# Patient Record
Sex: FEMALE | Race: BLACK OR AFRICAN AMERICAN | NOT HISPANIC OR LATINO
[De-identification: names, ages, dates, MRNs, and addresses within clinical notes are randomized per-mention and may not be internally consistent; named-entity substitution may affect disease eponyms.]

---

## 2019-11-15 PROBLEM — Z00.00 ENCOUNTER FOR PREVENTIVE HEALTH EXAMINATION: Status: ACTIVE | Noted: 2019-11-15

## 2020-01-21 ENCOUNTER — APPOINTMENT (OUTPATIENT)
Dept: OBGYN | Facility: CLINIC | Age: 30
End: 2020-01-21
Payer: COMMERCIAL

## 2020-01-21 VITALS
BODY MASS INDEX: 39.09 KG/M2 | SYSTOLIC BLOOD PRESSURE: 100 MMHG | DIASTOLIC BLOOD PRESSURE: 80 MMHG | HEIGHT: 64 IN | WEIGHT: 229 LBS

## 2020-01-21 DIAGNOSIS — Z30.431 ENCOUNTER FOR ROUTINE CHECKING OF INTRAUTERINE CONTRACEPTIVE DEVICE: ICD-10-CM

## 2020-01-21 DIAGNOSIS — N39.0 URINARY TRACT INFECTION, SITE NOT SPECIFIED: ICD-10-CM

## 2020-01-21 DIAGNOSIS — Z82.0 FAMILY HISTORY OF EPILEPSY AND OTHER DISEASES OF THE NERVOUS SYSTEM: ICD-10-CM

## 2020-01-21 DIAGNOSIS — Z80.42 FAMILY HISTORY OF MALIGNANT NEOPLASM OF PROSTATE: ICD-10-CM

## 2020-01-21 DIAGNOSIS — R87.622 LOW GRADE SQUAMOUS INTRAEPITHELIAL LESION ON CYTOLOGIC SMEAR OF VAGINA (LGSIL): ICD-10-CM

## 2020-01-21 PROCEDURE — 99204 OFFICE O/P NEW MOD 45 MIN: CPT

## 2020-01-21 RX ORDER — MULTIVITAMIN
TABLET ORAL
Refills: 0 | Status: ACTIVE | COMMUNITY

## 2020-01-21 NOTE — HISTORY OF PRESENT ILLNESS
[Normal Amount/Duration] : was of a normal amount and duration [Spotting Between  Menses] : spotting reported between menses [Sexually Active] : is not sexually active

## 2020-01-21 NOTE — PHYSICAL EXAM
[Awake] : awake [Alert] : alert [Acute Distress] : no acute distress [LAD] : no lymphadenopathy [Mass] : no breast mass [Nipple Discharge] : no nipple discharge [Axillary LAD] : no axillary lymphadenopathy [Soft] : soft [Tender] : non tender [Oriented x3] : oriented to person, place, and time [Normal] : uterus [No Bleeding] : there was no active vaginal bleeding [Uterine Adnexae] : were not tender and not enlarged [Enlarged ___ wks] : enlarged [unfilled] ~Uweeks [Adnexa Tenderness] : were not tender [Ovarian Mass (___ Cm)] : there were no adnexal masses

## 2020-01-21 NOTE — COUNSELING
[Nutrition] : nutrition [Exercise] : exercise [STD (testing, results, tx)] : STD (testing, results, tx) [Fertility Options] : fertility options [Contraception] : contraception [Pre/Post Op Instructions] : pre/post op instructions

## 2020-02-04 LAB
C TRACH RRNA SPEC QL NAA+PROBE: NOT DETECTED
CANDIDA VAG CYTO: NOT DETECTED
G VAGINALIS+PREV SP MTYP VAG QL MICRO: NOT DETECTED
N GONORRHOEA RRNA SPEC QL NAA+PROBE: NOT DETECTED
SOURCE AMPLIFICATION: NORMAL
T VAGINALIS VAG QL WET PREP: NOT DETECTED

## 2020-02-05 ENCOUNTER — FORM ENCOUNTER (OUTPATIENT)
Age: 30
End: 2020-02-05

## 2020-02-06 ENCOUNTER — APPOINTMENT (OUTPATIENT)
Dept: MRI IMAGING | Facility: CLINIC | Age: 30
End: 2020-02-06
Payer: COMMERCIAL

## 2020-02-06 ENCOUNTER — OUTPATIENT (OUTPATIENT)
Dept: OUTPATIENT SERVICES | Facility: HOSPITAL | Age: 30
LOS: 1 days | End: 2020-02-06
Payer: COMMERCIAL

## 2020-02-06 DIAGNOSIS — D21.9 BENIGN NEOPLASM OF CONNECTIVE AND OTHER SOFT TISSUE, UNSPECIFIED: ICD-10-CM

## 2020-02-06 PROCEDURE — 72197 MRI PELVIS W/O & W/DYE: CPT | Mod: 26

## 2020-02-06 PROCEDURE — 72197 MRI PELVIS W/O & W/DYE: CPT

## 2020-02-06 PROCEDURE — A9585: CPT

## 2020-03-02 ENCOUNTER — TRANSCRIPTION ENCOUNTER (OUTPATIENT)
Age: 30
End: 2020-03-02

## 2020-03-03 ENCOUNTER — APPOINTMENT (OUTPATIENT)
Dept: OBGYN | Facility: CLINIC | Age: 30
End: 2020-03-03
Payer: COMMERCIAL

## 2020-03-03 PROCEDURE — 99243 OFF/OP CNSLTJ NEW/EST LOW 30: CPT

## 2020-06-29 ENCOUNTER — FORM ENCOUNTER (OUTPATIENT)
Age: 30
End: 2020-06-29

## 2020-06-30 ENCOUNTER — APPOINTMENT (OUTPATIENT)
Dept: OBGYN | Facility: CLINIC | Age: 30
End: 2020-06-30
Payer: COMMERCIAL

## 2020-06-30 PROCEDURE — 36415 COLL VENOUS BLD VENIPUNCTURE: CPT

## 2020-07-24 ENCOUNTER — OUTPATIENT (OUTPATIENT)
Dept: OUTPATIENT SERVICES | Facility: HOSPITAL | Age: 30
LOS: 1 days | Discharge: HOME | End: 2020-07-24

## 2020-07-24 ENCOUNTER — APPOINTMENT (OUTPATIENT)
Dept: HEMATOLOGY ONCOLOGY | Facility: CLINIC | Age: 30
End: 2020-07-24
Payer: COMMERCIAL

## 2020-07-24 DIAGNOSIS — Z78.9 OTHER SPECIFIED HEALTH STATUS: ICD-10-CM

## 2020-07-24 DIAGNOSIS — D21.9 BENIGN NEOPLASM OF CONNECTIVE AND OTHER SOFT TISSUE, UNSPECIFIED: ICD-10-CM

## 2020-07-24 DIAGNOSIS — R71.8 OTHER ABNORMALITY OF RED BLOOD CELLS: ICD-10-CM

## 2020-07-24 DIAGNOSIS — Z83.511 FAMILY HISTORY OF GLAUCOMA: ICD-10-CM

## 2020-07-24 DIAGNOSIS — Z80.7 FAMILY HISTORY OF OTHER MALIGNANT NEOPLASMS OF LYMPHOID, HEMATOPOIETIC AND RELATED TISSUES: ICD-10-CM

## 2020-07-24 PROCEDURE — 99204 OFFICE O/P NEW MOD 45 MIN: CPT | Mod: 95

## 2020-07-24 NOTE — CONSULT LETTER
[Dear  ___] : Dear  [unfilled], [Consult Letter:] : I had the pleasure of evaluating your patient, [unfilled]. [Consult Closing:] : Thank you very much for allowing me to participate in the care of this patient.  If you have any questions, please do not hesitate to contact me. [Please see my note below.] : Please see my note below. [Sincerely,] : Sincerely, [FreeTextEntry2] : Dr Vicky Renee

## 2020-07-24 NOTE — PHYSICAL EXAM
[Normal] : grossly intact [Fully active, able to carry on all pre-disease performance without restriction] : Status 0 - Fully active, able to carry on all pre-disease performance without restriction [Obese] : obese

## 2020-07-24 NOTE — REVIEW OF SYSTEMS
[Negative] : Allergic/Immunologic [FreeTextEntry8] : low abdominal pain likely secondary to large fibroid.

## 2020-07-24 NOTE — HISTORY OF PRESENT ILLNESS
[0 - No Distress] : Distress Level: 0 [Medical Office: ([facility])___] : at the medical office located in  [Home] : at home, [unfilled] , at the time of the visit. [Verbal consent obtained from patient] : the patient, [unfilled] [de-identified] : 31 yo woman with  no significant PMHx, referred for evaluation of erythrocytosis.\par \par Patient has a uterine fibroid and was scheduled for resection but the surgery was cancelled as a result of the pandemia. Preop work up was c/w erythrocytosis. \par \par Patient's menses are every 28 days, bleeds for 10-15 days, changes pads twice a day. \par \par Denies headaches, dizziness, lightheadedness, changes in vision, fevers, night sweats or weight.  \par \par 6/30/2020: WBC 6.61, RBC 5.43, Hb 16, Hct 51.8%, MCV 95.4, RDW 13.8%, , neutrophils 63.8%, lymphocytes 21.9%, monocytes 9.4%, eos 4.1%, basos 0.5%.

## 2020-07-24 NOTE — ASSESSMENT
[FreeTextEntry1] : 29 yo woman with  no significant PMHx, referred for evaluation of erythrocytosis.\par \par Patient has a uterine fibroid and  was scheduled for resection but the surgery was cancelled as a result of the pandemia. Preop work up was c/w erythrocytosis. \par \par Patient's menses are every 28 days, bleeds for 10-15 days, changes pads twice a day. \par \par Denies headaches, dizziness, lightheadedness, changes in vision, fevers, night sweats or weight.  \par \par 6/30/2020: WBC 6.61, RBC 5.43, Hb 16, Hct 51.8%, MCV 95.4, RDW 13.8%, , neutrophils 63.8%, lymphocytes 21.9%, monocytes 9.4%, eos 4.1%, basos 0.5%. \par \par I had a detailed discussion today with the patient regarding the natural history, epidemiology, diagnosis and treatment of erythrocytosis . I reviewed her laboratory studies in detail today. I then discussed the differential diagnosis including a reactive process, essential thrombocythemia.Complete work up was ordered to test patient for JUSTIN 2 v 617f, MPL, MADISON and BCR-ABL mutations.  I answered all her questions to satisfaction.\par \par This service was provided by using telehealth. The patient was at home and [place] [facility] The patient requested and participated in this encounter. The encounter face to face last  45   minutes coordinating his/her care and counseling.\par \par \par RTC 3 months. \par

## 2020-07-27 ENCOUNTER — APPOINTMENT (OUTPATIENT)
Dept: HEMATOLOGY ONCOLOGY | Facility: CLINIC | Age: 30
End: 2020-07-27

## 2020-07-27 ENCOUNTER — RESULT REVIEW (OUTPATIENT)
Age: 30
End: 2020-07-27

## 2020-07-27 LAB
BASOPHILS # BLD AUTO: 0.06 K/UL — SIGNIFICANT CHANGE UP (ref 0–0.2)
BASOPHILS NFR BLD AUTO: 1 % — SIGNIFICANT CHANGE UP (ref 0–2)
EOSINOPHIL # BLD AUTO: 0.42 K/UL — SIGNIFICANT CHANGE UP (ref 0–0.5)
EOSINOPHIL NFR BLD AUTO: 7 % — HIGH (ref 0–6)
HCT VFR BLD CALC: 47.6 % — HIGH (ref 34.5–45)
HGB BLD-MCNC: 15.8 G/DL — HIGH (ref 11.5–15.5)
LYMPHOCYTES # BLD AUTO: 2.36 K/UL — SIGNIFICANT CHANGE UP (ref 1–3.3)
LYMPHOCYTES # BLD AUTO: 39 % — SIGNIFICANT CHANGE UP (ref 13–44)
MCHC RBC-ENTMCNC: 30.1 PG — SIGNIFICANT CHANGE UP (ref 27–34)
MCHC RBC-ENTMCNC: 33.2 GM/DL — SIGNIFICANT CHANGE UP (ref 32–36)
MCV RBC AUTO: 90.7 FL — SIGNIFICANT CHANGE UP (ref 80–100)
MONOCYTES # BLD AUTO: 0.97 K/UL — HIGH (ref 0–0.9)
MONOCYTES NFR BLD AUTO: 16 % — HIGH (ref 2–14)
NEUTROPHILS # BLD AUTO: 2.24 K/UL — SIGNIFICANT CHANGE UP (ref 1.8–7.4)
NEUTROPHILS NFR BLD AUTO: 37 % — LOW (ref 43–77)
NRBC # BLD: 0 /100 — SIGNIFICANT CHANGE UP (ref 0–0)
NRBC # BLD: SIGNIFICANT CHANGE UP /100 WBCS (ref 0–0)
PLAT MORPH BLD: NORMAL — SIGNIFICANT CHANGE UP
PLATELET # BLD AUTO: 238 K/UL — SIGNIFICANT CHANGE UP (ref 150–400)
RBC # BLD: 5.25 M/UL — HIGH (ref 3.8–5.2)
RBC # FLD: 13.9 % — SIGNIFICANT CHANGE UP (ref 10.3–14.5)
RBC BLD AUTO: SIGNIFICANT CHANGE UP
WBC # BLD: 6.05 K/UL — SIGNIFICANT CHANGE UP (ref 3.8–10.5)
WBC # FLD AUTO: 6.05 K/UL — SIGNIFICANT CHANGE UP (ref 3.8–10.5)

## 2020-08-19 ENCOUNTER — RESULT REVIEW (OUTPATIENT)
Age: 30
End: 2020-08-19

## 2020-08-19 ENCOUNTER — OUTPATIENT (OUTPATIENT)
Dept: OUTPATIENT SERVICES | Facility: HOSPITAL | Age: 30
LOS: 1 days | End: 2020-08-19
Payer: COMMERCIAL

## 2020-08-19 ENCOUNTER — APPOINTMENT (OUTPATIENT)
Dept: HEMATOLOGY ONCOLOGY | Facility: CLINIC | Age: 30
End: 2020-08-19

## 2020-08-19 ENCOUNTER — LABORATORY RESULT (OUTPATIENT)
Age: 30
End: 2020-08-19

## 2020-08-19 DIAGNOSIS — R71.8 OTHER ABNORMALITY OF RED BLOOD CELLS: ICD-10-CM

## 2020-08-19 LAB
BASOPHILS # BLD AUTO: 0.04 K/UL — SIGNIFICANT CHANGE UP (ref 0–0.2)
BASOPHILS NFR BLD AUTO: 0.8 % — SIGNIFICANT CHANGE UP (ref 0–2)
EOSINOPHIL # BLD AUTO: 0.16 K/UL — SIGNIFICANT CHANGE UP (ref 0–0.5)
EOSINOPHIL NFR BLD AUTO: 3 % — SIGNIFICANT CHANGE UP (ref 0–6)
HCT VFR BLD CALC: 45.5 % — HIGH (ref 34.5–45)
HGB BLD-MCNC: 15.2 G/DL — SIGNIFICANT CHANGE UP (ref 11.5–15.5)
IMM GRANULOCYTES NFR BLD AUTO: 0.4 % — SIGNIFICANT CHANGE UP (ref 0–1.5)
LYMPHOCYTES # BLD AUTO: 2.07 K/UL — SIGNIFICANT CHANGE UP (ref 1–3.3)
LYMPHOCYTES # BLD AUTO: 38.9 % — SIGNIFICANT CHANGE UP (ref 13–44)
MCHC RBC-ENTMCNC: 29.7 PG — SIGNIFICANT CHANGE UP (ref 27–34)
MCHC RBC-ENTMCNC: 33.4 GM/DL — SIGNIFICANT CHANGE UP (ref 32–36)
MCV RBC AUTO: 88.9 FL — SIGNIFICANT CHANGE UP (ref 80–100)
MONOCYTES # BLD AUTO: 0.67 K/UL — SIGNIFICANT CHANGE UP (ref 0–0.9)
MONOCYTES NFR BLD AUTO: 12.6 % — SIGNIFICANT CHANGE UP (ref 2–14)
NEUTROPHILS # BLD AUTO: 2.36 K/UL — SIGNIFICANT CHANGE UP (ref 1.8–7.4)
NEUTROPHILS NFR BLD AUTO: 44.3 % — SIGNIFICANT CHANGE UP (ref 43–77)
NRBC # BLD: 0 /100 WBCS — SIGNIFICANT CHANGE UP (ref 0–0)
PLATELET # BLD AUTO: 255 K/UL — SIGNIFICANT CHANGE UP (ref 150–400)
RBC # BLD: 5.12 M/UL — SIGNIFICANT CHANGE UP (ref 3.8–5.2)
RBC # FLD: 14.9 % — HIGH (ref 10.3–14.5)
WBC # BLD: 5.32 K/UL — SIGNIFICANT CHANGE UP (ref 3.8–10.5)
WBC # FLD AUTO: 5.32 K/UL — SIGNIFICANT CHANGE UP (ref 3.8–10.5)

## 2020-08-19 PROCEDURE — G0452: CPT | Mod: 26

## 2020-08-19 PROCEDURE — 81219 CALR GENE COM VARIANTS: CPT

## 2020-08-19 PROCEDURE — 81206 BCR/ABL1 GENE MAJOR BP: CPT

## 2020-08-19 PROCEDURE — 81207 BCR/ABL1 GENE MINOR BP: CPT

## 2020-08-19 PROCEDURE — 81270 JAK2 GENE: CPT

## 2020-08-20 LAB
ALBUMIN SERPL ELPH-MCNC: 4.2 G/DL
ALP BLD-CCNC: 90 U/L
ALT SERPL-CCNC: 50 U/L
ANION GAP SERPL CALC-SCNC: 10 MMOL/L
AST SERPL-CCNC: 33 U/L
BILIRUB SERPL-MCNC: 0.4 MG/DL
BUN SERPL-MCNC: 7 MG/DL
CALCIUM SERPL-MCNC: 9.5 MG/DL
CHLORIDE SERPL-SCNC: 106 MMOL/L
CO2 SERPL-SCNC: 22 MMOL/L
CREAT SERPL-MCNC: 0.56 MG/DL
FERRITIN SERPL-MCNC: 44 NG/ML
GLUCOSE SERPL-MCNC: 80 MG/DL
IRON SATN MFR SERPL: 27 %
IRON SERPL-MCNC: 74 UG/DL
POTASSIUM SERPL-SCNC: 4.2 MMOL/L
PROT SERPL-MCNC: 6.9 G/DL
SODIUM SERPL-SCNC: 139 MMOL/L
TIBC SERPL-MCNC: 279 UG/DL
UIBC SERPL-MCNC: 204 UG/DL

## 2020-08-24 LAB
BCR/ABL BY RT - PCR QUANTITATIVE: SIGNIFICANT CHANGE UP
JAK2 P.V617F BLD/T QL: SIGNIFICANT CHANGE UP

## 2020-08-25 LAB — CALRETICULIN INTERPRETATION: SIGNIFICANT CHANGE UP

## 2020-10-21 ENCOUNTER — OUTPATIENT (OUTPATIENT)
Dept: OUTPATIENT SERVICES | Facility: HOSPITAL | Age: 30
LOS: 1 days | Discharge: HOME | End: 2020-10-21

## 2020-10-21 DIAGNOSIS — R71.8 OTHER ABNORMALITY OF RED BLOOD CELLS: ICD-10-CM

## 2020-10-27 ENCOUNTER — APPOINTMENT (OUTPATIENT)
Dept: HEMATOLOGY ONCOLOGY | Facility: CLINIC | Age: 30
End: 2020-10-27
Payer: COMMERCIAL

## 2020-10-27 DIAGNOSIS — D75.1 SECONDARY POLYCYTHEMIA: ICD-10-CM

## 2020-10-27 PROCEDURE — 99213 OFFICE O/P EST LOW 20 MIN: CPT | Mod: 95

## 2020-10-27 RX ORDER — VALACYCLOVIR 1 G/1
1 TABLET, FILM COATED ORAL
Qty: 30 | Refills: 0 | Status: DISCONTINUED | COMMUNITY
Start: 2020-07-31

## 2020-10-27 NOTE — ASSESSMENT
[FreeTextEntry1] : 29 yo woman with  no significant PMHx, referred for evaluation of erythrocytosis.\par \par Patient has a uterine fibroid and  was scheduled for resection but the surgery was cancelled as a result of the pandemia. Preop work up was c/w erythrocytosis. \par \par BCR/ABL 8/19/2020- negative\par CALR negative\par JUSTIN 2 v617F negative\par MPL negative.\par \par 8/20/2020: Hb 15.2 %, Hct 45.5%.\par \par 7/27/2020; Hb 15.8 g/dl, Hct 47.6%.\par \par Will refer patient to pulmonologist to r/o sleep apnea as the etiology for erythrocytosis. \par \par \par This service was provided by using telehealth. The patient was at home and I was at [facility]. The patient requested and participated in this encounter. The encounter face to face last  45   minutes coordinating his/her care and counseling.\par \par \par RTC prn. \par

## 2020-10-27 NOTE — CONSULT LETTER
[Dear  ___] : Dear  [unfilled], [Consult Letter:] : I had the pleasure of evaluating your patient, [unfilled]. [Please see my note below.] : Please see my note below. [Consult Closing:] : Thank you very much for allowing me to participate in the care of this patient.  If you have any questions, please do not hesitate to contact me. [Sincerely,] : Sincerely, [FreeTextEntry2] : Dr Vicky Renee

## 2020-10-27 NOTE — HISTORY OF PRESENT ILLNESS
[0 - No Distress] : Distress Level: 0 [Home] : at home, [unfilled] , at the time of the visit. [Medical Office: ([facility])___] : at the medical office located in  [Verbal consent obtained from patient] : the patient, [unfilled] [de-identified] : 31 yo woman with  no significant PMHx, referred for evaluation of erythrocytosis.\par \par Patient has a uterine fibroid and was scheduled for resection but the surgery was cancelled as a result of the pandemia. Preop work up was c/w erythrocytosis. \par \par Patient's menses are every 28 days, bleeds for 10-15 days, changes pads twice a day. \par \par Denies headaches, dizziness, lightheadedness, changes in vision, fevers, night sweats or weight.  \par \par 6/30/2020: WBC 6.61, RBC 5.43, Hb 16, Hct 51.8%, MCV 95.4, RDW 13.8%, , neutrophils 63.8%, lymphocytes 21.9%, monocytes 9.4%, eos 4.1%, basos 0.5%.  [de-identified] : BCR/ABL 8/19/2020- negative\par CALR negative\par JUSTIN 2 v617F negative\par MPL negative.\par \par 8/20/2020: Hb 15.2 %, Hct 45.5%.\par \par 7/27/2020; Hb 15.8 g/dl, Hct 47.6%.\par \par No other changes in medical, surgical or social history since 7/24/2020. \par \par \par \par

## 2020-10-28 ENCOUNTER — APPOINTMENT (OUTPATIENT)
Dept: HEMATOLOGY ONCOLOGY | Facility: CLINIC | Age: 30
End: 2020-10-28

## 2020-10-28 ENCOUNTER — RESULT REVIEW (OUTPATIENT)
Age: 30
End: 2020-10-28

## 2020-10-28 LAB
BASOPHILS # BLD AUTO: 0.03 K/UL — SIGNIFICANT CHANGE UP (ref 0–0.2)
BASOPHILS NFR BLD AUTO: 0.5 % — SIGNIFICANT CHANGE UP (ref 0–2)
EOSINOPHIL # BLD AUTO: 0.17 K/UL — SIGNIFICANT CHANGE UP (ref 0–0.5)
EOSINOPHIL NFR BLD AUTO: 2.6 % — SIGNIFICANT CHANGE UP (ref 0–6)
HCT VFR BLD CALC: 48 % — HIGH (ref 34.5–45)
HGB BLD-MCNC: 16 G/DL — HIGH (ref 11.5–15.5)
IMM GRANULOCYTES NFR BLD AUTO: 0.3 % — SIGNIFICANT CHANGE UP (ref 0–1.5)
LYMPHOCYTES # BLD AUTO: 1.78 K/UL — SIGNIFICANT CHANGE UP (ref 1–3.3)
LYMPHOCYTES # BLD AUTO: 26.7 % — SIGNIFICANT CHANGE UP (ref 13–44)
MCHC RBC-ENTMCNC: 30.2 PG — SIGNIFICANT CHANGE UP (ref 27–34)
MCHC RBC-ENTMCNC: 33.3 G/DL — SIGNIFICANT CHANGE UP (ref 32–36)
MCV RBC AUTO: 90.7 FL — SIGNIFICANT CHANGE UP (ref 80–100)
MONOCYTES # BLD AUTO: 0.6 K/UL — SIGNIFICANT CHANGE UP (ref 0–0.9)
MONOCYTES NFR BLD AUTO: 9 % — SIGNIFICANT CHANGE UP (ref 2–14)
NEUTROPHILS # BLD AUTO: 4.06 K/UL — SIGNIFICANT CHANGE UP (ref 1.8–7.4)
NEUTROPHILS NFR BLD AUTO: 60.9 % — SIGNIFICANT CHANGE UP (ref 43–77)
NRBC # BLD: 0 /100 WBCS — SIGNIFICANT CHANGE UP (ref 0–0)
PLATELET # BLD AUTO: 246 K/UL — SIGNIFICANT CHANGE UP (ref 150–400)
RBC # BLD: 5.29 M/UL — HIGH (ref 3.8–5.2)
RBC # FLD: 13.4 % — SIGNIFICANT CHANGE UP (ref 10.3–14.5)
WBC # BLD: 6.66 K/UL — SIGNIFICANT CHANGE UP (ref 3.8–10.5)
WBC # FLD AUTO: 6.66 K/UL — SIGNIFICANT CHANGE UP (ref 3.8–10.5)

## 2020-10-29 LAB — LDH SERPL-CCNC: 221 U/L

## 2020-11-11 ENCOUNTER — OUTPATIENT (OUTPATIENT)
Dept: OUTPATIENT SERVICES | Facility: HOSPITAL | Age: 30
LOS: 1 days | End: 2020-11-11
Payer: COMMERCIAL

## 2020-11-11 VITALS
DIASTOLIC BLOOD PRESSURE: 88 MMHG | OXYGEN SATURATION: 97 % | HEIGHT: 65 IN | RESPIRATION RATE: 18 BRPM | HEART RATE: 80 BPM | TEMPERATURE: 98 F | WEIGHT: 223.11 LBS | SYSTOLIC BLOOD PRESSURE: 124 MMHG

## 2020-11-11 DIAGNOSIS — D25.9 LEIOMYOMA OF UTERUS, UNSPECIFIED: ICD-10-CM

## 2020-11-11 DIAGNOSIS — Z98.890 OTHER SPECIFIED POSTPROCEDURAL STATES: Chronic | ICD-10-CM

## 2020-11-11 DIAGNOSIS — D75.1 SECONDARY POLYCYTHEMIA: ICD-10-CM

## 2020-11-11 DIAGNOSIS — N92.0 EXCESSIVE AND FREQUENT MENSTRUATION WITH REGULAR CYCLE: ICD-10-CM

## 2020-11-11 DIAGNOSIS — Z01.818 ENCOUNTER FOR OTHER PREPROCEDURAL EXAMINATION: ICD-10-CM

## 2020-11-11 LAB
ANION GAP SERPL CALC-SCNC: 12 MMOL/L — SIGNIFICANT CHANGE UP (ref 5–17)
BUN SERPL-MCNC: 8 MG/DL — SIGNIFICANT CHANGE UP (ref 7–23)
CALCIUM SERPL-MCNC: 9.5 MG/DL — SIGNIFICANT CHANGE UP (ref 8.4–10.5)
CHLORIDE SERPL-SCNC: 107 MMOL/L — SIGNIFICANT CHANGE UP (ref 96–108)
CO2 SERPL-SCNC: 21 MMOL/L — LOW (ref 22–31)
CREAT SERPL-MCNC: 0.59 MG/DL — SIGNIFICANT CHANGE UP (ref 0.5–1.3)
GLUCOSE SERPL-MCNC: 91 MG/DL — SIGNIFICANT CHANGE UP (ref 70–99)
HCT VFR BLD CALC: 49.9 % — HIGH (ref 34.5–45)
HGB BLD-MCNC: 16.2 G/DL — HIGH (ref 11.5–15.5)
MCHC RBC-ENTMCNC: 29.8 PG — SIGNIFICANT CHANGE UP (ref 27–34)
MCHC RBC-ENTMCNC: 32.5 GM/DL — SIGNIFICANT CHANGE UP (ref 32–36)
MCV RBC AUTO: 91.7 FL — SIGNIFICANT CHANGE UP (ref 80–100)
NRBC # BLD: 0 /100 WBCS — SIGNIFICANT CHANGE UP (ref 0–0)
PLATELET # BLD AUTO: 275 K/UL — SIGNIFICANT CHANGE UP (ref 150–400)
POTASSIUM SERPL-MCNC: 4.1 MMOL/L — SIGNIFICANT CHANGE UP (ref 3.5–5.3)
POTASSIUM SERPL-SCNC: 4.1 MMOL/L — SIGNIFICANT CHANGE UP (ref 3.5–5.3)
RBC # BLD: 5.44 M/UL — HIGH (ref 3.8–5.2)
RBC # FLD: 13.3 % — SIGNIFICANT CHANGE UP (ref 10.3–14.5)
SODIUM SERPL-SCNC: 140 MMOL/L — SIGNIFICANT CHANGE UP (ref 135–145)
WBC # BLD: 4.67 K/UL — SIGNIFICANT CHANGE UP (ref 3.8–10.5)
WBC # FLD AUTO: 4.67 K/UL — SIGNIFICANT CHANGE UP (ref 3.8–10.5)

## 2020-11-11 PROCEDURE — 80048 BASIC METABOLIC PNL TOTAL CA: CPT

## 2020-11-11 PROCEDURE — G0463: CPT

## 2020-11-11 PROCEDURE — 85027 COMPLETE CBC AUTOMATED: CPT

## 2020-11-11 RX ORDER — CIPROFLOXACIN LACTATE 400MG/40ML
400 VIAL (ML) INTRAVENOUS ONCE
Refills: 0 | Status: DISCONTINUED | OUTPATIENT
Start: 2020-11-23 | End: 2020-11-25

## 2020-11-11 NOTE — H&P PST ADULT - NSICDXPASTMEDICALHX_GEN_ALL_CORE_FT
PAST MEDICAL HISTORY:  Erythrocytosis      PAST MEDICAL HISTORY:  Erythrocytosis monitored by hematologist

## 2020-11-11 NOTE — H&P PST ADULT - ATTENDING COMMENTS
pt seen and plan discussed. to proceed with abdominal myomectomy, removal of IUD. informed consent signed and witnessed.     ANT Renee

## 2020-11-11 NOTE — H&P PST ADULT - NSANTHOSAYNRD_GEN_A_CORE
1 No. CAITLYN screening performed.  STOP BANG Legend: 0-2 = LOW Risk; 3-4 = INTERMEDIATE Risk; 5-8 = HIGH Risk

## 2020-11-11 NOTE — H&P PST ADULT - HISTORY OF PRESENT ILLNESS
This is a 31 y/o female c/o This is a 29 y/o female c/o heavy and frequent menses, sonogram revealed + leiomyoma, she presents today for abdominal myomectomy  COVID 19 PCR to be done 11/20 at [facility] [facility]

## 2020-11-11 NOTE — H&P PST ADULT - NSICDXPROBLEM_GEN_ALL_CORE_FT
PROBLEM DIAGNOSES  Problem: Erythrocytosis  Assessment and Plan: follow up with hematologist    Problem: Leiomyoma of uterus  Assessment and Plan: abdominal myomectomy

## 2020-11-12 ENCOUNTER — APPOINTMENT (OUTPATIENT)
Dept: PULMONOLOGY | Facility: CLINIC | Age: 30
End: 2020-11-12
Payer: COMMERCIAL

## 2020-11-12 VITALS
SYSTOLIC BLOOD PRESSURE: 125 MMHG | WEIGHT: 223 LBS | BODY MASS INDEX: 38.07 KG/M2 | TEMPERATURE: 97.9 F | RESPIRATION RATE: 15 BRPM | HEART RATE: 78 BPM | DIASTOLIC BLOOD PRESSURE: 85 MMHG | HEIGHT: 64 IN | OXYGEN SATURATION: 95 %

## 2020-11-12 DIAGNOSIS — E66.9 OBESITY, UNSPECIFIED: ICD-10-CM

## 2020-11-12 DIAGNOSIS — R06.83 SNORING: ICD-10-CM

## 2020-11-12 PROCEDURE — 99204 OFFICE O/P NEW MOD 45 MIN: CPT

## 2020-11-12 PROCEDURE — 99072 ADDL SUPL MATRL&STAF TM PHE: CPT

## 2020-11-12 NOTE — REVIEW OF SYSTEMS
[EDS: ESS=____] : daytime somnolence: ESS=[unfilled] [Snoring] : snoring [Fatigue] : no fatigue [Nasal Congestion] : no nasal congestion [Shortness Of Breath] : no shortness of breath [Orthopnea] : no orthopnea [Chest Pain] : no chest pain [Edema] : ~T edema was not present [Obesity] : not obese [Diabetes] : no diabetes  [Anemia] : no anemia [A.M. Headache] : no headache present upon awakening [Leg Dysesthesias] : no leg dysesthesias [Cataplexy] :  no cataplexy [Heartburn] : no heartburn [Nocturia] : no nocturia [Arthralgias] : no arthralgias [Fibromyalgia] : no fibromyalgia [Depression] : no depression [Difficulty Initiating Sleep] : no difficulty falling asleep [Difficulty Maintaining Sleep] : no difficulty maintaining sleep [Lower Extremity Discomfort] : no lower extremity discomfort [Unusual Sleep Behavior] : no unusual sleep behavior [Hypersomnolence] : not sleeping much more than usual [Sleep Paralysis] : no sleep paralysis [FreeTextEntry8] : occasional but infrequent snoring [de-identified] : erythrocytosis

## 2020-11-12 NOTE — HISTORY OF PRESENT ILLNESS
[Snoring] : snoring [ESS: ___] : ESS score [unfilled] [FreeTextEntry1] : The patient is a 30 year old woman with a history of erythrocytosis presenting for evaluation for CAITLYN as possible cause of erythrocytosis. \par \par The patient notes that she snores but denies frequent awakenings.  She denies dry mouth and awakens feeling refreshed.  She works as a  and denies EDS.  Her in office ESS was 4 on today's visit.\par \par \par ____________________________________________________________________\par EPWORTH SLEEPINESS SCALE\par \par How likely are you to doze off or fall asleep in the situations described below, in contrast to feeling just tired?  \par This refers to your usual way of life in recent times.  \par Even if you haven't done some of these things recently, try to work out how they would have affected you.\par Use the following scale to choose one most appropriate number for each situation.\par \par Chance of dozing.............Situation\par ..............1..........................Sitting and reading\par ..............1..........................Watching TV\par ..............0..........................Sitting inactive in a public place (eg a theatre or a meeting)\par ..............1..........................As a passenger in a car for an hour without a break\par ..............1..........................Lying down to rest in the afternoon when circumstances permit\par ..............0..........................Sitting and talking to someone\par ..............0..........................Sitting quietly after lunch without alcohol\par ..............0..........................In a car, while stopped for a few minutes in traffic\par \par ..............4..........................TOTAL SCORE\par \par 0 = Never would doze\par 1 = Slight chance of dozing\par 2 = Moderate chance of dozing\par 3 = High chance of dozing \par ______________________________________________________________________  [Witnessed Apneas] : no witnessed sleep apnea [Frequent Nocturnal Awakening] : no nocturnal awakening [Daytime Somnolence] : no daytime somnolence [Unintentional Sleep while Active] : no unintentional sleep while active [Unintentional Sleep While Inactive] : no unintentional sleep while inactive [Awakes Unrefreshed] : does not awake unrefreshed [Awakes with Headache] : no headache upon awakening [Awakening With Dry Mouth] : no dry mouth upon awakening [Recent  Weight Gain] : no recent weight gain

## 2020-11-12 NOTE — CONSULT LETTER
[Dear  ___] : Dear  [unfilled], [Please see my note below.] : Please see my note below. [Sincerely,] : Sincerely, [FreeTextEntry3] : Toni Gotti, DO\par Pulmonary, Critical Care and Sleep Medicine Attending

## 2020-11-12 NOTE — PHYSICAL EXAM
[General Appearance - Well Developed] : well developed [General Appearance - Well Nourished] : well nourished [Normal Appearance] : normal appearance [Well Groomed] : well groomed [No Deformities] : no deformities [General Appearance - In No Acute Distress] : no acute distress [Normal Conjunctiva] : the conjunctiva exhibited no abnormalities [Eyelids - No Xanthelasma] : the eyelids demonstrated no xanthelasmas [Low Lying Soft Palate] : low lying soft palate [Neck Appearance] : the appearance of the neck was normal [Neck Cervical Mass (___cm)] : no neck mass was observed [Jugular Venous Distention Increased] : there was no jugular-venous distention [Thyroid Diffuse Enlargement] : the thyroid was not enlarged [Thyroid Nodule] : there were no palpable thyroid nodules [Heart Rate And Rhythm] : heart rate was normal and rhythm regular [Heart Sounds] : normal S1 and S2 [Heart Sounds Gallop] : no gallops [Murmurs] : no murmurs [Heart Sounds Pericardial Friction Rub] : no pericardial rub [Auscultation Breath Sounds / Voice Sounds] : lungs were clear to auscultation bilaterally [Abnormal Walk] : normal gait [Musculoskeletal - Swelling] : no joint swelling seen [Motor Tone] : muscle strength and tone were normal [Nail Clubbing] : no clubbing of the fingernails [Cyanosis, Localized] : no localized cyanosis [Petechial Hemorrhages (___cm)] : no petechial hemorrhages [Skin Color & Pigmentation] : normal skin color and pigmentation [Skin Turgor] : normal skin turgor [] : no rash [Deep Tendon Reflexes (DTR)] : deep tendon reflexes were 2+ and symmetric [Sensation] : the sensory exam was normal to light touch and pinprick [No Focal Deficits] : no focal deficits [Oriented To Time, Place, And Person] : oriented to person, place, and time [Impaired Insight] : insight and judgment were intact [Affect] : the affect was normal [FreeTextEntry1] : obese weight range

## 2020-11-16 PROBLEM — D75.1 SECONDARY POLYCYTHEMIA: Chronic | Status: ACTIVE | Noted: 2020-11-11

## 2020-11-19 DIAGNOSIS — Z01.818 ENCOUNTER FOR OTHER PREPROCEDURAL EXAMINATION: ICD-10-CM

## 2020-11-20 ENCOUNTER — APPOINTMENT (OUTPATIENT)
Dept: DISASTER EMERGENCY | Facility: CLINIC | Age: 30
End: 2020-11-20

## 2020-11-21 LAB — SARS-COV-2 N GENE NPH QL NAA+PROBE: NOT DETECTED

## 2020-11-22 ENCOUNTER — TRANSCRIPTION ENCOUNTER (OUTPATIENT)
Age: 30
End: 2020-11-22

## 2020-11-23 ENCOUNTER — INPATIENT (INPATIENT)
Facility: HOSPITAL | Age: 30
LOS: 2 days | Discharge: HOME | DRG: 742 | End: 2020-11-26
Attending: STUDENT IN AN ORGANIZED HEALTH CARE EDUCATION/TRAINING PROGRAM | Admitting: STUDENT IN AN ORGANIZED HEALTH CARE EDUCATION/TRAINING PROGRAM
Payer: COMMERCIAL

## 2020-11-23 ENCOUNTER — APPOINTMENT (OUTPATIENT)
Dept: OBGYN | Facility: HOSPITAL | Age: 30
End: 2020-11-23

## 2020-11-23 VITALS
HEART RATE: 105 BPM | WEIGHT: 223.11 LBS | HEIGHT: 65 IN | SYSTOLIC BLOOD PRESSURE: 140 MMHG | RESPIRATION RATE: 16 BRPM | DIASTOLIC BLOOD PRESSURE: 94 MMHG | TEMPERATURE: 97 F | OXYGEN SATURATION: 98 %

## 2020-11-23 DIAGNOSIS — Z01.818 ENCOUNTER FOR OTHER PREPROCEDURAL EXAMINATION: ICD-10-CM

## 2020-11-23 DIAGNOSIS — N92.0 EXCESSIVE AND FREQUENT MENSTRUATION WITH REGULAR CYCLE: ICD-10-CM

## 2020-11-23 DIAGNOSIS — D25.9 LEIOMYOMA OF UTERUS, UNSPECIFIED: ICD-10-CM

## 2020-11-23 DIAGNOSIS — Z98.890 OTHER SPECIFIED POSTPROCEDURAL STATES: Chronic | ICD-10-CM

## 2020-11-23 DIAGNOSIS — Z09 ENCOUNTER FOR FOLLOW-UP EXAMINATION AFTER COMPLETED TREATMENT FOR CONDITIONS OTHER THAN MALIGNANT NEOPLASM: ICD-10-CM

## 2020-11-23 LAB
HCG UR QL: NEGATIVE — SIGNIFICANT CHANGE UP
HCT VFR BLD CALC: 34 % — LOW (ref 34.5–45)
HGB BLD-MCNC: 11 G/DL — LOW (ref 11.5–15.5)
MCHC RBC-ENTMCNC: 29.7 PG — SIGNIFICANT CHANGE UP (ref 27–34)
MCHC RBC-ENTMCNC: 32.4 GM/DL — SIGNIFICANT CHANGE UP (ref 32–36)
MCV RBC AUTO: 91.9 FL — SIGNIFICANT CHANGE UP (ref 80–100)
NRBC # BLD: 0 /100 WBCS — SIGNIFICANT CHANGE UP (ref 0–0)
PLATELET # BLD AUTO: 254 K/UL — SIGNIFICANT CHANGE UP (ref 150–400)
RBC # BLD: 3.7 M/UL — LOW (ref 3.8–5.2)
RBC # FLD: 13 % — SIGNIFICANT CHANGE UP (ref 10.3–14.5)
RH IG SCN BLD-IMP: NEGATIVE — SIGNIFICANT CHANGE UP
WBC # BLD: 13.74 K/UL — HIGH (ref 3.8–10.5)
WBC # FLD AUTO: 13.74 K/UL — HIGH (ref 3.8–10.5)

## 2020-11-23 PROCEDURE — 58140 MYOMECTOMY ABDOM METHOD: CPT | Mod: 80

## 2020-11-23 PROCEDURE — 88305 TISSUE EXAM BY PATHOLOGIST: CPT | Mod: 26

## 2020-11-23 PROCEDURE — 58301 REMOVE INTRAUTERINE DEVICE: CPT | Mod: 52

## 2020-11-23 PROCEDURE — 58301 REMOVE INTRAUTERINE DEVICE: CPT | Mod: 80,52

## 2020-11-23 PROCEDURE — 58140 MYOMECTOMY ABDOM METHOD: CPT

## 2020-11-23 RX ORDER — HEPARIN SODIUM 5000 [USP'U]/ML
5000 INJECTION INTRAVENOUS; SUBCUTANEOUS EVERY 12 HOURS
Refills: 0 | Status: DISCONTINUED | OUTPATIENT
Start: 2020-11-23 | End: 2020-11-23

## 2020-11-23 RX ORDER — SODIUM CHLORIDE 9 MG/ML
1000 INJECTION, SOLUTION INTRAVENOUS
Refills: 0 | Status: DISCONTINUED | OUTPATIENT
Start: 2020-11-23 | End: 2020-11-25

## 2020-11-23 RX ORDER — ACETAMINOPHEN 500 MG
975 TABLET ORAL ONCE
Refills: 0 | Status: COMPLETED | OUTPATIENT
Start: 2020-11-23 | End: 2020-11-23

## 2020-11-23 RX ORDER — ACETAMINOPHEN 500 MG
1000 TABLET ORAL ONCE
Refills: 0 | Status: COMPLETED | OUTPATIENT
Start: 2020-11-23 | End: 2020-11-23

## 2020-11-23 RX ORDER — LIDOCAINE HCL 20 MG/ML
0.2 VIAL (ML) INJECTION ONCE
Refills: 0 | Status: DISCONTINUED | OUTPATIENT
Start: 2020-11-23 | End: 2020-11-23

## 2020-11-23 RX ORDER — ONDANSETRON 8 MG/1
4 TABLET, FILM COATED ORAL EVERY 6 HOURS
Refills: 0 | Status: DISCONTINUED | OUTPATIENT
Start: 2020-11-23 | End: 2020-11-23

## 2020-11-23 RX ORDER — CHLORHEXIDINE GLUCONATE 213 G/1000ML
1 SOLUTION TOPICAL ONCE
Refills: 0 | Status: DISCONTINUED | OUTPATIENT
Start: 2020-11-23 | End: 2020-11-23

## 2020-11-23 RX ORDER — SODIUM CHLORIDE 9 MG/ML
3 INJECTION INTRAMUSCULAR; INTRAVENOUS; SUBCUTANEOUS EVERY 8 HOURS
Refills: 0 | Status: DISCONTINUED | OUTPATIENT
Start: 2020-11-23 | End: 2020-11-23

## 2020-11-23 RX ORDER — OXYCODONE HYDROCHLORIDE 5 MG/1
5 TABLET ORAL EVERY 4 HOURS
Refills: 0 | Status: DISCONTINUED | OUTPATIENT
Start: 2020-11-23 | End: 2020-11-25

## 2020-11-23 RX ORDER — SODIUM CHLORIDE 9 MG/ML
1000 INJECTION, SOLUTION INTRAVENOUS
Refills: 0 | Status: DISCONTINUED | OUTPATIENT
Start: 2020-11-23 | End: 2020-11-23

## 2020-11-23 RX ORDER — KETOROLAC TROMETHAMINE 30 MG/ML
30 SYRINGE (ML) INJECTION EVERY 6 HOURS
Refills: 0 | Status: DISCONTINUED | OUTPATIENT
Start: 2020-11-23 | End: 2020-11-24

## 2020-11-23 RX ORDER — OXYCODONE HYDROCHLORIDE 5 MG/1
5 TABLET ORAL
Refills: 0 | Status: DISCONTINUED | OUTPATIENT
Start: 2020-11-23 | End: 2020-11-25

## 2020-11-23 RX ORDER — FAMOTIDINE 10 MG/ML
20 INJECTION INTRAVENOUS ONCE
Refills: 0 | Status: COMPLETED | OUTPATIENT
Start: 2020-11-23 | End: 2020-11-23

## 2020-11-23 RX ORDER — CELECOXIB 200 MG/1
200 CAPSULE ORAL ONCE
Refills: 0 | Status: COMPLETED | OUTPATIENT
Start: 2020-11-23 | End: 2020-11-23

## 2020-11-23 RX ORDER — HEPARIN SODIUM 5000 [USP'U]/ML
5000 INJECTION INTRAVENOUS; SUBCUTANEOUS EVERY 8 HOURS
Refills: 0 | Status: DISCONTINUED | OUTPATIENT
Start: 2020-11-23 | End: 2020-11-24

## 2020-11-23 RX ORDER — FENTANYL CITRATE 50 UG/ML
50 INJECTION INTRAVENOUS
Refills: 0 | Status: DISCONTINUED | OUTPATIENT
Start: 2020-11-23 | End: 2020-11-23

## 2020-11-23 RX ORDER — ONDANSETRON 8 MG/1
4 TABLET, FILM COATED ORAL ONCE
Refills: 0 | Status: DISCONTINUED | OUTPATIENT
Start: 2020-11-23 | End: 2020-11-23

## 2020-11-23 RX ORDER — METOCLOPRAMIDE HCL 10 MG
10 TABLET ORAL ONCE
Refills: 0 | Status: COMPLETED | OUTPATIENT
Start: 2020-11-23 | End: 2020-11-23

## 2020-11-23 RX ORDER — ACETAMINOPHEN 500 MG
1000 TABLET ORAL ONCE
Refills: 0 | Status: COMPLETED | OUTPATIENT
Start: 2020-11-24 | End: 2020-11-24

## 2020-11-23 RX ORDER — ONDANSETRON 8 MG/1
4 TABLET, FILM COATED ORAL EVERY 6 HOURS
Refills: 0 | Status: DISCONTINUED | OUTPATIENT
Start: 2020-11-23 | End: 2020-11-26

## 2020-11-23 RX ORDER — HYDROMORPHONE HYDROCHLORIDE 2 MG/ML
0.25 INJECTION INTRAMUSCULAR; INTRAVENOUS; SUBCUTANEOUS
Refills: 0 | Status: DISCONTINUED | OUTPATIENT
Start: 2020-11-23 | End: 2020-11-23

## 2020-11-23 RX ADMIN — Medication 30 MILLIGRAM(S): at 23:22

## 2020-11-23 RX ADMIN — Medication 1000 MILLIGRAM(S): at 23:52

## 2020-11-23 RX ADMIN — Medication 400 MILLIGRAM(S): at 18:22

## 2020-11-23 RX ADMIN — CELECOXIB 200 MILLIGRAM(S): 200 CAPSULE ORAL at 06:11

## 2020-11-23 RX ADMIN — ONDANSETRON 4 MILLIGRAM(S): 8 TABLET, FILM COATED ORAL at 16:45

## 2020-11-23 RX ADMIN — Medication 10 MILLIGRAM(S): at 19:42

## 2020-11-23 RX ADMIN — Medication 400 MILLIGRAM(S): at 23:22

## 2020-11-23 RX ADMIN — FAMOTIDINE 20 MILLIGRAM(S): 10 INJECTION INTRAVENOUS at 06:11

## 2020-11-23 RX ADMIN — Medication 1000 MILLIGRAM(S): at 18:56

## 2020-11-23 RX ADMIN — HEPARIN SODIUM 5000 UNIT(S): 5000 INJECTION INTRAVENOUS; SUBCUTANEOUS at 23:22

## 2020-11-23 RX ADMIN — Medication 30 MILLIGRAM(S): at 23:52

## 2020-11-23 NOTE — BRIEF OPERATIVE NOTE - COMMENTS
Vistaseal and fibrillar used, no adhesion barriers Vistaseal and fibrillar used, no adhesion barriers  Dictation #[id number]

## 2020-11-23 NOTE — PROGRESS NOTE ADULT - PROBLEM SELECTOR PLAN 1
Neuro: IV Tylenol/Toradol/Oxycodone prn  CV: Hemodynamically stable.  Monitor VS. CBC at 3:30pm and in AM.   Pulm: Saturating well on room air.  Encourage OOB and incentive spirometer use.   GI: Advance to regular diet. Anti-emetics PRN.  : Ibarra to gravity.   FEN: Electrolytes: LR@125cc/hr.   Heme: DVT ppx w/ SCD's while in bed. Early ambulation, initially with assistance then as tolerated.  Continue HSQ   ID: Afebrile  Endo: No active issues   Dispo: Discharge from PACU when criteria met.

## 2020-11-23 NOTE — PRE-ANESTHESIA EVALUATION ADULT - NSANTHPMHFT_GEN_ALL_CORE
31 y/o F with PMHx signifcant for uterine fibroids with heavy menses, erythrocytosis presents for an abdominal myomectomy.  Covid Negative 11/22  Patient was referred to pulmonology for sleep apnea study by PCP for evaluation of erythrocytosis but was unable to obtain appointment prior to surgery date. She is asymptomatic from her erythrocytosis.

## 2020-11-23 NOTE — PRE-ANESTHESIA EVALUATION ADULT - NSANTHPEFT_GEN_ALL_CORE
NECK: FROM  CARDS: RRR  LNGS: CTAB GENERAL: NAD  HEAD:  Atraumatic, Normocephalic  CHEST/LUNG: Clear to auscultation bilaterally  HEART: Normal S1/S2  PSYCH: AAOx3  NEUROLOGY: non-focal  SKIN: No obvious rashes or lesions

## 2020-11-23 NOTE — PROGRESS NOTE ADULT - SUBJECTIVE AND OBJECTIVE BOX
PA POST-OP CHECK    S: Patient seen and evaluated at bedside.  Pt sleeping, easily arousable  Patient reports pain controlled with analgesia. Pt denies N/V, SOB, CP, palpitations, fever/chills. Tolerating clears.  Not OOB yet.    O:   T(C): 36 (11-23-20 @ 14:00), Max: 36 (11-23-20 @ 11:45)  HR: 91 (11-23-20 @ 14:00) (84 - 92)  BP: 109/65 (11-23-20 @ 14:00) (109/65 - 123/73)  RR: 17 (11-23-20 @ 14:00) (15 - 17)  SpO2: 99% (11-23-20 @ 14:00) (97% - 100%)  Wt(kg): --  I&O's Summary    23 Nov 2020 07:01  -  23 Nov 2020 14:42  --------------------------------------------------------  IN: 0 mL / OUT: 610 mL / NET: -610 mL        Gen: Resting comfortably in bed, NAD  CV: S1S2, RRR  Lungs: CTA B/L  Abd: soft, appropriately tender, occassional BS x 4 quadrants.    Inc: Clean/dry/intact w/ bandage midline  Ext: SCD's in place and functional, non-tender b/l, no edema

## 2020-11-23 NOTE — BRIEF OPERATIVE NOTE - OPERATION/FINDINGS
30w sized uterus on bimanual exam. 30 cm right lateral completely subserosal fibroid vs. pedunculated at the right lateral aspect that was completely encapsulated within the right broad ligament. Normal uterus otherwise with no other fibroids in the myometrium, stretched right fallopian tube over the fibroid at it's superior end, otherwise normal, normal right ovary, normal left tube and ovary.

## 2020-11-24 ENCOUNTER — TRANSCRIPTION ENCOUNTER (OUTPATIENT)
Age: 30
End: 2020-11-24

## 2020-11-24 LAB
BASOPHILS # BLD AUTO: 0.01 K/UL — SIGNIFICANT CHANGE UP (ref 0–0.2)
BASOPHILS # BLD AUTO: 0.01 K/UL — SIGNIFICANT CHANGE UP (ref 0–0.2)
BASOPHILS NFR BLD AUTO: 0.1 % — SIGNIFICANT CHANGE UP (ref 0–2)
BASOPHILS NFR BLD AUTO: 0.1 % — SIGNIFICANT CHANGE UP (ref 0–2)
EOSINOPHIL # BLD AUTO: 0 K/UL — SIGNIFICANT CHANGE UP (ref 0–0.5)
EOSINOPHIL # BLD AUTO: 0 K/UL — SIGNIFICANT CHANGE UP (ref 0–0.5)
EOSINOPHIL NFR BLD AUTO: 0 % — SIGNIFICANT CHANGE UP (ref 0–6)
EOSINOPHIL NFR BLD AUTO: 0 % — SIGNIFICANT CHANGE UP (ref 0–6)
HCT VFR BLD CALC: 21.9 % — LOW (ref 34.5–45)
HCT VFR BLD CALC: 23.1 % — LOW (ref 34.5–45)
HCT VFR BLD CALC: 25 % — LOW (ref 34.5–45)
HGB BLD-MCNC: 7.5 G/DL — LOW (ref 11.5–15.5)
HGB BLD-MCNC: 8 G/DL — LOW (ref 11.5–15.5)
HGB BLD-MCNC: 8.4 G/DL — LOW (ref 11.5–15.5)
IMM GRANULOCYTES NFR BLD AUTO: 0.2 % — SIGNIFICANT CHANGE UP (ref 0–1.5)
IMM GRANULOCYTES NFR BLD AUTO: 0.4 % — SIGNIFICANT CHANGE UP (ref 0–1.5)
LYMPHOCYTES # BLD AUTO: 0.85 K/UL — LOW (ref 1–3.3)
LYMPHOCYTES # BLD AUTO: 1.48 K/UL — SIGNIFICANT CHANGE UP (ref 1–3.3)
LYMPHOCYTES # BLD AUTO: 10.6 % — LOW (ref 13–44)
LYMPHOCYTES # BLD AUTO: 15.3 % — SIGNIFICANT CHANGE UP (ref 13–44)
MCHC RBC-ENTMCNC: 30.2 PG — SIGNIFICANT CHANGE UP (ref 27–34)
MCHC RBC-ENTMCNC: 30.5 PG — SIGNIFICANT CHANGE UP (ref 27–34)
MCHC RBC-ENTMCNC: 30.9 PG — SIGNIFICANT CHANGE UP (ref 27–34)
MCHC RBC-ENTMCNC: 33.6 GM/DL — SIGNIFICANT CHANGE UP (ref 32–36)
MCHC RBC-ENTMCNC: 34.2 GM/DL — SIGNIFICANT CHANGE UP (ref 32–36)
MCHC RBC-ENTMCNC: 34.6 GM/DL — SIGNIFICANT CHANGE UP (ref 32–36)
MCV RBC AUTO: 88.2 FL — SIGNIFICANT CHANGE UP (ref 80–100)
MCV RBC AUTO: 89.9 FL — SIGNIFICANT CHANGE UP (ref 80–100)
MCV RBC AUTO: 90.1 FL — SIGNIFICANT CHANGE UP (ref 80–100)
MONOCYTES # BLD AUTO: 0.89 K/UL — SIGNIFICANT CHANGE UP (ref 0–0.9)
MONOCYTES # BLD AUTO: 1.35 K/UL — HIGH (ref 0–0.9)
MONOCYTES NFR BLD AUTO: 11.1 % — SIGNIFICANT CHANGE UP (ref 2–14)
MONOCYTES NFR BLD AUTO: 13.9 % — SIGNIFICANT CHANGE UP (ref 2–14)
NEUTROPHILS # BLD AUTO: 6.27 K/UL — SIGNIFICANT CHANGE UP (ref 1.8–7.4)
NEUTROPHILS # BLD AUTO: 6.8 K/UL — SIGNIFICANT CHANGE UP (ref 1.8–7.4)
NEUTROPHILS NFR BLD AUTO: 70.3 % — SIGNIFICANT CHANGE UP (ref 43–77)
NEUTROPHILS NFR BLD AUTO: 78 % — HIGH (ref 43–77)
NRBC # BLD: 0 /100 WBCS — SIGNIFICANT CHANGE UP (ref 0–0)
PLATELET # BLD AUTO: 208 K/UL — SIGNIFICANT CHANGE UP (ref 150–400)
PLATELET # BLD AUTO: 221 K/UL — SIGNIFICANT CHANGE UP (ref 150–400)
PLATELET # BLD AUTO: 241 K/UL — SIGNIFICANT CHANGE UP (ref 150–400)
RBC # BLD: 2.43 M/UL — LOW (ref 3.8–5.2)
RBC # BLD: 2.62 M/UL — LOW (ref 3.8–5.2)
RBC # BLD: 2.78 M/UL — LOW (ref 3.8–5.2)
RBC # FLD: 13 % — SIGNIFICANT CHANGE UP (ref 10.3–14.5)
RBC # FLD: 13.1 % — SIGNIFICANT CHANGE UP (ref 10.3–14.5)
RBC # FLD: 13.9 % — SIGNIFICANT CHANGE UP (ref 10.3–14.5)
WBC # BLD: 8.04 K/UL — SIGNIFICANT CHANGE UP (ref 3.8–10.5)
WBC # BLD: 8.11 K/UL — SIGNIFICANT CHANGE UP (ref 3.8–10.5)
WBC # BLD: 9.68 K/UL — SIGNIFICANT CHANGE UP (ref 3.8–10.5)
WBC # FLD AUTO: 8.04 K/UL — SIGNIFICANT CHANGE UP (ref 3.8–10.5)
WBC # FLD AUTO: 8.11 K/UL — SIGNIFICANT CHANGE UP (ref 3.8–10.5)
WBC # FLD AUTO: 9.68 K/UL — SIGNIFICANT CHANGE UP (ref 3.8–10.5)

## 2020-11-24 RX ORDER — DIPHENHYDRAMINE HCL 50 MG
25 CAPSULE ORAL ONCE
Refills: 0 | Status: COMPLETED | OUTPATIENT
Start: 2020-11-24 | End: 2020-11-24

## 2020-11-24 RX ORDER — ASCORBIC ACID 60 MG
500 TABLET,CHEWABLE ORAL DAILY
Refills: 0 | Status: DISCONTINUED | OUTPATIENT
Start: 2020-11-24 | End: 2020-11-25

## 2020-11-24 RX ORDER — ACETAMINOPHEN 500 MG
975 TABLET ORAL EVERY 6 HOURS
Refills: 0 | Status: DISCONTINUED | OUTPATIENT
Start: 2020-11-24 | End: 2020-11-25

## 2020-11-24 RX ORDER — SIMETHICONE 80 MG/1
80 TABLET, CHEWABLE ORAL DAILY
Refills: 0 | Status: DISCONTINUED | OUTPATIENT
Start: 2020-11-24 | End: 2020-11-26

## 2020-11-24 RX ORDER — ACETAMINOPHEN 500 MG
1000 TABLET ORAL ONCE
Refills: 0 | Status: COMPLETED | OUTPATIENT
Start: 2020-11-24 | End: 2020-11-24

## 2020-11-24 RX ORDER — FERROUS SULFATE 325(65) MG
325 TABLET ORAL DAILY
Refills: 0 | Status: DISCONTINUED | OUTPATIENT
Start: 2020-11-24 | End: 2020-11-25

## 2020-11-24 RX ORDER — METOCLOPRAMIDE HCL 10 MG
10 TABLET ORAL ONCE
Refills: 0 | Status: DISCONTINUED | OUTPATIENT
Start: 2020-11-24 | End: 2020-11-26

## 2020-11-24 RX ORDER — SENNA PLUS 8.6 MG/1
1 TABLET ORAL DAILY
Refills: 0 | Status: DISCONTINUED | OUTPATIENT
Start: 2020-11-24 | End: 2020-11-26

## 2020-11-24 RX ORDER — IBUPROFEN 200 MG
600 TABLET ORAL EVERY 6 HOURS
Refills: 0 | Status: DISCONTINUED | OUTPATIENT
Start: 2020-11-24 | End: 2020-11-25

## 2020-11-24 RX ORDER — HEPARIN SODIUM 5000 [USP'U]/ML
5000 INJECTION INTRAVENOUS; SUBCUTANEOUS EVERY 8 HOURS
Refills: 0 | Status: DISCONTINUED | OUTPATIENT
Start: 2020-11-24 | End: 2020-11-25

## 2020-11-24 RX ADMIN — Medication 400 MILLIGRAM(S): at 14:26

## 2020-11-24 RX ADMIN — OXYCODONE HYDROCHLORIDE 5 MILLIGRAM(S): 5 TABLET ORAL at 08:20

## 2020-11-24 RX ADMIN — Medication 30 MILLIGRAM(S): at 06:12

## 2020-11-24 RX ADMIN — Medication 500 MILLIGRAM(S): at 13:22

## 2020-11-24 RX ADMIN — HEPARIN SODIUM 5000 UNIT(S): 5000 INJECTION INTRAVENOUS; SUBCUTANEOUS at 18:24

## 2020-11-24 RX ADMIN — Medication 1000 MILLIGRAM(S): at 18:26

## 2020-11-24 RX ADMIN — SIMETHICONE 80 MILLIGRAM(S): 80 TABLET, CHEWABLE ORAL at 13:22

## 2020-11-24 RX ADMIN — SODIUM CHLORIDE 125 MILLILITER(S): 9 INJECTION, SOLUTION INTRAVENOUS at 08:26

## 2020-11-24 RX ADMIN — Medication 25 MILLIGRAM(S): at 10:21

## 2020-11-24 RX ADMIN — Medication 600 MILLIGRAM(S): at 18:26

## 2020-11-24 RX ADMIN — Medication 30 MILLIGRAM(S): at 18:26

## 2020-11-24 RX ADMIN — SENNA PLUS 1 TABLET(S): 8.6 TABLET ORAL at 13:22

## 2020-11-24 RX ADMIN — Medication 600 MILLIGRAM(S): at 23:58

## 2020-11-24 RX ADMIN — Medication 30 MILLIGRAM(S): at 13:21

## 2020-11-24 RX ADMIN — OXYCODONE HYDROCHLORIDE 5 MILLIGRAM(S): 5 TABLET ORAL at 10:52

## 2020-11-24 RX ADMIN — Medication 975 MILLIGRAM(S): at 21:00

## 2020-11-24 RX ADMIN — ONDANSETRON 4 MILLIGRAM(S): 8 TABLET, FILM COATED ORAL at 10:03

## 2020-11-24 RX ADMIN — Medication 400 MILLIGRAM(S): at 05:40

## 2020-11-24 RX ADMIN — SODIUM CHLORIDE 125 MILLILITER(S): 9 INJECTION, SOLUTION INTRAVENOUS at 05:40

## 2020-11-24 RX ADMIN — Medication 30 MILLIGRAM(S): at 05:42

## 2020-11-24 RX ADMIN — Medication 1000 MILLIGRAM(S): at 06:12

## 2020-11-24 RX ADMIN — Medication 325 MILLIGRAM(S): at 13:23

## 2020-11-24 RX ADMIN — Medication 975 MILLIGRAM(S): at 22:41

## 2020-11-24 RX ADMIN — Medication 600 MILLIGRAM(S): at 18:24

## 2020-11-24 NOTE — DISCHARGE NOTE PROVIDER - NSDCFUADDINST_GEN_ALL_CORE_FT
Regular diet as tolerated, regular activity as tolerated, no heavy lifting for first two weeks. Call your provider if you experience fevers, chills, worsening abdominal pain, inability to urinate or worsening vaginal bleeding.  Follow up with your provider in 2 weeks.

## 2020-11-24 NOTE — CHART NOTE - NSCHARTNOTEFT_GEN_A_CORE
DELAYED ENTRY 2/2 PATIENT CARE     Patient seen and evaluated at bedside 2/2 lightheadedness and dizziness while attempting to stand out of bed, with assist from nursing. Patient reports feeling lightheaded and flushed upon standing, and having to sit back down. She denied chest pain, palpitations or shortness of breath at the time. She notes that while sitting or laying in bed, she is asymptomatic. She currently endorses resolution to her symptoms. She notes slight abdominal pain which is well controlled with medication. She notes some nausea and difficulty tolerating large amounts of PO, but notes that she is able to eat without emesis.     Of note patient with 60cc concentrated UOP 8-11PM, now improved to 200c UOP (concentrated) 11-1AM. Currently receiving maintenance fluids LR@75cc/hr.     VS  T(C): 36.9 (11-23-20 @ 22:14)  HR: 84 (11-23-20 @ 19:22)  BP: 92/61 (11-23-20 @ 19:22)  RR: 18 (11-23-20 @ 22:14)  SpO2: 95% (11-23-20 @ 22:14)    Gen: A&Ox3, NAD, resting in bed   CV: RRR  Pulm: CTA B/L, good air movement throughout   Abd: Soft, nontender, nondistended. Midline vertical incision c/d/i with bandage in place.   : Ibarra catheter in situ, concentrated urine output.   Ext: SCDs in place. No LE edema bilaterally.     A/P: 31y/o F now immediately postoperative s/p abdominal myomectomy with presyncopal episode upon attempting to stand. Patient asymptomatic and with stable vitals while resting in bed. Tolerating only small amounts of oral intake postoperatively 2/2 nausea, and with concentrated urine output. Symptoms are most likely 2/2 dehydration, given reassuring physical exam and vital signs - overall low concern for active bleeding.    - STAT CBC    - Will increase maintenance fluids to LR@125cc/hr    - Encourage PO intake, Zofran/Reglan PRN.     d/w Dr. Emi Moran, PGY-2

## 2020-11-24 NOTE — DISCHARGE NOTE PROVIDER - CARE PROVIDER_API CALL
Vicky Renee  OBSTETRICS AND GYNECOLOGY  [address] [place] [address]  Phone: [phone]  Fax: [phone]  Follow Up Time:

## 2020-11-24 NOTE — PROGRESS NOTE ADULT - ASSESSMENT
A/P: 30y POD#1 s/p Abdominal myomectomy that was uncomplicated (EBL=1100). Patient is improving symptomatically and hemodynamically stable, though with downtrending H/H that is inappropriate given EBL.      Neuro:   #Post-op pain:  - Scheduled toradol, Tylenol    - Oxycodone prn  - s/p TAP blocks     CV/Resp:   #Acute blood loss from surgery: Pre-op Hct=49.9. EBL 1100mL. Hemodynamically stable. Pt with benign abdominal exam and VSS, though downtrending H/H. Will continue to monitor to determine need for medical intervention   - F/u POD#1 CBCP    GI/FEN:   F: LR @ 125. Saline lock IV when tolerating PO.  E: replete electrolytes PRN  N:  Advance to regular diet    : no active issues  - UOP adequate, d/c lovett once tolerating PO    Gyn: History of fibroids, now s/p abdominal myomectomy.  - F/u final path    ID: no active issues. afebrile.     Endo: no active issues.     Prophy: s/p SubQ heparin. c/w SCDs. Encourage ambulation and IS.    Dispo: when tolerating PO, pain well-controlled on PO meds, voiding and ambulating.     Sybil Han, PGY-1    D/W Gyn Team

## 2020-11-24 NOTE — PROGRESS NOTE ADULT - ATTENDING COMMENTS
MIGS FELLOW PROGRESS NOTE  I have seen and evaluated the patient, read the above note, and agree with resident assessment and plan with the following additions/exceptions:     Ms. Booth is now POD#1/HD#2 s/p ex-lap, abdominal myomectomy with bilateral QL blocks per anesthesia. Case noteworthy for acute blood loss anemia, EBL 1100cc  Presyncopal episode overnight a/w nausea, and NB/NB emesis & low UOP (33cc/hr) from 7pm-1am. Stat labs notable for the following Hct trend (preop 49.9 -> 4hr postop 34 -> 2am Hct 24). IVF increased to 125cc/hr with overall improvement in UOP.   This morning, pt reports feeling well. Pain well controlled and has not taken Oxycodone overnight. Interval resolution in nausea and lightheadedness/dizziness. Denies CP, SOB, fevers, chills. No flatus as yet. Tolerating water without issue. Feels discomfort from urinary catheter. Objectively, pt AF, VS notable for relative hypotension from baseline (120-140/80-90s), mild tachycardia 95bpm with now adequate UOP. Exam overall nonfocal with soft, NT, ND, benign abdomen. Midline vertical incision with dressing in place. AM CBC pending. Patient is overall recovering well. Relative hypovolemia 2/2 acute blood loss anemia. Will f/u AM CBC and advance postoperatively    #Acute blood loss anemia  - f/u AM CBC  - start Fe/Vit C supplementation BID    #Postop  - reassess after breakfast, if tolerating PO -> transition to PO pain medications and MLIV    - OOB, ambulation, incentive spirometry  - start bowel regimen   - dc Ibarra for TOV   - restart Heparin SQ BID for DVT ppx    #GYN  -discussed extensive dissection with patient. Recommend 6 months prior to TTC to allow for myometrial healing, and likely  delivery    CWParin, PGY5  MIGS Fellow MIGS FELLOW PROGRESS NOTE  I have seen and evaluated the patient, read the above note, and agree with resident assessment and plan with the following additions/exceptions:     Ms. Booth is now POD#1/HD#2 s/p ex-lap, abdominal myomectomy with bilateral QL blocks per anesthesia. Case noteworthy for acute blood loss anemia, EBL 1100cc  Presyncopal episode overnight a/w nausea, and NB/NB emesis & low UOP (33cc/hr) from 7pm-1am. Stat labs notable for the following Hct trend (preop 49.9 -> 4hr postop 34 -> 2am Hct 24). IVF increased to 125cc/hr with overall improvement in UOP.   This morning, pt reports feeling well. Pain well controlled and has not taken Oxycodone overnight. Interval resolution in nausea and lightheadedness/dizziness. Denies CP, SOB, fevers, chills. No flatus as yet. Tolerating water without issue. Feels discomfort from urinary catheter. Objectively, pt AF, VS notable for relative hypotension from baseline (120-140/80-90s), mild tachycardia 95bpm with now adequate UOP. Exam overall nonfocal with soft, NT, ND, benign abdomen. Midline vertical incision with dressing in place. AM CBC pending. Patient is overall recovering well. Relative hypovolemia 2/2 acute blood loss anemia. Will f/u AM CBC and advance postoperatively    #Acute blood loss anemia  - f/u AM CBC  - start Fe/Vit C supplementation BID    #Postop  - reassess after breakfast, if tolerating PO -> transition to PO pain medications and MLIV    - OOB, ambulation, incentive spirometry  - start bowel regimen   - dc Ibarra for TOV   - restart Heparin SQ BID for DVT ppx    #GYN  -discussed extensive dissection with patient. Recommend 6 months prior to TTC to allow for myometrial healing, and likely  delivery    CWChan, PGY5  MIGS Fellow      GYN Attending Note    pt seen and examined at 730am. feeling well and repeat cbc pending. discussed possible blood transfusion. will continue to monitor closely. continue iv fluids. pt seen and examined at 730pm. still feeling well, much improved after one unit prbc. post-transfusion hgb 8.0. likely equilibration. mild tachycardia. abd softly distended on exam. will give another unit of blood. awaiting TOV.     AWilly Renee

## 2020-11-24 NOTE — PROGRESS NOTE ADULT - SUBJECTIVE AND OBJECTIVE BOX
GYN Progress Note    POD#1   HD#2    Patient seen and examined at bedside by GYN team. Overnight patient had pre-syncopal episode in setting of low UOP and poor PO intake, with improvement in symptoms and UOP after increasing IVF. STAT CBC notable for downtrending H/H.. This morning notes no dizziness while resting. Pain well controlled with IV pain meds.  Patient is not yet ambulating and not yet tolerating regular diet, noting nausea with eating last night.  Has not yet passed flatus.   Ibarra is still in place.   Denies CP, SOB, N/V, fevers, and chills.    Vital Signs Last 24 Hours  T(C): 36.8 (11-24-20 @ 06:00), Max: 36.9 (11-23-20 @ 22:14)  HR: 95 (11-24-20 @ 06:00) (84 - 105)  BP: 102/68 (11-24-20 @ 06:00) (92/61 - 140/94)  RR: 18 (11-24-20 @ 06:00) (15 - 18)  SpO2: 95% (11-24-20 @ 06:00) (95% - 100%)    I&O's Summary    23 Nov 2020 07:01  -  24 Nov 2020 06:37  --------------------------------------------------------  IN: 375 mL / OUT: 1860 mL / NET: -1485 mL        Physical Exam:  General: NAD  CV: RRR  Lungs: CTAB  Abdomen: soft, appropriately-tender, non-distended, normoactive bowel sounds  Incision: midline incision with bandage CDI  Ext: no pain or swelling     Labs:                        8.4    8.04  )-----------( 221      ( 24 Nov 2020 02:16 )             25.0   baso 0.1    eos 0.0    imm gran 0.2    lymph 10.6   mono 11.1   poly 78.0                         11.0   13.74 )-----------( 254      ( 23 Nov 2020 17:05 )             34.0                8.4<L>  8.04  )-----------( 221      ( 11-24 @ 02:16 )             25.0<L>               11.0<L>  13.74<H> )-----------( 254      ( 11-23 @ 17:05 )             34.0<L>                  ABO Interpretation: A (11-23-20)      MEDICATIONS  (STANDING):  ciprofloxacin   IVPB 400 milliGRAM(s) IV Intermittent once  clindamycin IVPB 900 milliGRAM(s) IV Intermittent once  ketorolac   Injectable 30 milliGRAM(s) IV Push every 6 hours  lactated ringers. 1000 milliLiter(s) (125 mL/Hr) IV Continuous <Continuous>    MEDICATIONS  (PRN):  ondansetron Injectable 4 milliGRAM(s) IV Push every 6 hours PRN Nausea and/or Vomiting  oxyCODONE    IR 5 milliGRAM(s) Oral every 4 hours PRN Severe Pain (7 - 10)  oxyCODONE    IR 5 milliGRAM(s) Oral every 3 hours PRN Severe Pain (7 - 10)

## 2020-11-24 NOTE — CHART NOTE - NSCHARTNOTEFT_GEN_A_CORE
R2 chart note     Patient seen and evaluated at bedside after repeat H/H (8.0/23.1 from 7.5/21.9, s/p 1 u PRBC). Patient found resting comfortably in bed, sitting upright, eating dinner. She reports that she feels better - with more energy after her transfusion. She denies nausea. States that she is making an effort to eat/drink more. She denies lightheadedness or dizziness. Notes intermittent "heart racing" throughout the afternoon however currently denies the sensation. Denies chest pain, shortness of breath. Denies vaginal bleeding. Notes that her abdominal pain has remained well controlled. Has not yet voided s/p Ibarra removal, however thinks she will shortly.     VS  T(C): 37.1 (11-24-20 @ 19:30)  HR: 114 (11-24-20 @ 19:30)  BP: 111/72 (11-24-20 @ 19:30)  RR: 20 (11-24-20 @ 19:30)  SpO2: 96% (11-24-20 @ 19:30)    Exam:   Gen: A&Ox3, sitting upright in bed, NAD   CV: Tachycardic, regular rhythm   Pulm: CTA B/L   Abd: Soft, appropriately tender. Nondistended. No rebound tenderness or guarding. Midline vertical incision c/d/i.   Ext: SCDs in place     A/P: 29y/o F now POD#1 s/p abdominal myomectomy with decline in H/H noted postoperatively. Patient now s/p 1u PRBC transfusion with slightly less than expected rise in PRBC, however symptomatically improving - no acute concern for bleeding given reassuring overall patient status.    - Will transfuse additional 1u PRBC    - AM CBC ordered     d/w Dr. Víctor Moran, PGY-2 R2 chart note     Patient seen and evaluated at bedside after repeat H/H (8.0/23.1 from 7.5/21.9, s/p 1 u PRBC). Patient found resting comfortably in bed, sitting upright, eating dinner. She reports that she feels better - with more energy after her transfusion. She denies nausea. States that she is making an effort to eat/drink more. She denies lightheadedness or dizziness. Notes intermittent "heart racing" throughout the afternoon however currently denies the sensation. Denies chest pain, shortness of breath. Denies vaginal bleeding. Notes that her abdominal pain has remained well controlled. Has not yet voided s/p Ibarra removal, however thinks she will shortly.     VS  T(C): 37.1 (11-24-20 @ 19:30)  HR: 114 (11-24-20 @ 19:30)  BP: 111/72 (11-24-20 @ 19:30)  RR: 20 (11-24-20 @ 19:30)  SpO2: 96% (11-24-20 @ 19:30)    Exam:   Gen: A&Ox3, sitting upright in bed, NAD   CV: Tachycardic, regular rhythm   Pulm: CTA B/L   Abd: Soft, appropriately tender. Nondistended. No rebound tenderness or guarding. Midline vertical incision c/d/i.   Ext: SCDs in place     A/P: 31y/o F now POD#1 s/p abdominal myomectomy with decline in H/H noted postoperatively. Patient now s/p 1u PRBC transfusion with slightly less than expected rise in H/H, however symptomatically improving - no acute concern for bleeding given reassuring overall patient status.    - Will transfuse additional 1u PRBC    - AM CBC ordered     d/w Dr. Víctor Moran, PGY-2

## 2020-11-24 NOTE — CHART NOTE - NSCHARTNOTEFT_GEN_A_CORE
Patient seen and evaluated at bedside after results of repeat H/H demonstrated downtrend to 8.4/25.0.     Patient found resting comfortably in bed. She states that she feels well - better than she did earlier in the evening. She denies lightheadedness or dizziness. Denies shortness of breath or chest pain. She notes that she is successfully attempting to trink more fluids, and her nausea is improved.     VS  T(C): 36.6 (11-24-20 @ 03:30)  HR: 88 (11-24-20 @ 03:30)  BP: 103/69 (11-24-20 @ 03:30)  RR: 18 (11-24-20 @ 03:30)  SpO2: 98% (11-24-20 @ 03:30)    EXAM  Gen: A&Ox3, NAD   CV: RRR   Pulm: CTA B/L   Abd: Soft, nontender, nondistended. No rebound tenderness or guarding. Midline vertical incision c/d/i  : Ibarra catheter in situ, additional 200cc urine output.    Ext: SCDs in place, no edema     LABS              8.4    8.04  )-----------( 221      ( 11-24 @ 02:16 )             25.0                11.0   13.74 )-----------( 254      ( 11-23 @ 17:05 )             34.0       A/P: 31y/o F now POD#1 s/p abdominal myomectomy. Patient with downtrend in H/H, however reassuring overall clinical status: Reassuring physical exam and stable vitals. Will continue to monitor patient closely, however currently low suspicion for ongoing bleed.      - Will repeat CBC in AM    - Will hold HSQ until repeat CBC results    - Will continue to monitor patient clinical status closely     d/w Dr. Emi Moran, PGY-2

## 2020-11-24 NOTE — DISCHARGE NOTE PROVIDER - CARE PROVIDERS DIRECT ADDRESSES
[email or web address]

## 2020-11-24 NOTE — DISCHARGE NOTE PROVIDER - HOSPITAL COURSE
31yo woman who had an abdominal myomectomy (EBL 1100). On POD#0, she was noted to have a downtrending H/H with Hct 49.9->34->25. ***    On POD#1, lovett was discontinued, and patient voided spontaneously.    Patient was discharged on POD#XXXXX with stable vitals, tolerating PO, voiding spontaneously, and ambulating. Patient to have close outpatient follow-up at the [facility] gyn clinic. 31yo woman who had an abdominal myomectomy (EBL 1100). On POD#1, olvett was discontinued, and patient voided spontaneously. She was noted to have a downtrending H/H with Hct 49.9->34->25. She was transfused 1u pRBC; repeat CBC showed an increasing Hct to 23; given that the patient was still mildly tachycardic with an inappropriate rise in her H/H, decision made to give an additional unit of 1u pRBC.     Patient was discharged on POD#XXXXX with stable vitals, tolerating PO, voiding spontaneously, and ambulating. Patient to have close outpatient follow-up with Dr. Renee. 31yo woman who had an abdominal myomectomy (EBL 1100). On POD#1, lovett was discontinued, and patient voided spontaneously. She was noted to have a downtrending H/H with Hct 49.9->34->25. She was transfused 4u pRBC during her hospital course. She was tachycardic during her post-op course and had a CTA which chose no evidence of a PE. She also had imaging of the CT A/P that showed a stable hematoma with no evidence of active bleeding.    Patient was discharged on POD#XXXXX with stable vitals, tolerating PO, voiding spontaneously, and ambulating. Patient to have close outpatient follow-up with Dr. Renee. 31yo woman who had an abdominal myomectomy (EBL 1100). On POD#1, lovett was discontinued, and patient voided spontaneously. She was noted to have a downtrending H/H with Hct 49.9->34->25. She was transfused 4u pRBC during her hospital course. She was tachycardic during her post-op course and had a CTA which chose no evidence of a PE. She also had imaging of the CT A/P that showed a stable hematoma with no evidence of active bleeding. Her H/H on POD#3 was stable at 9.7/29.1    Patient was discharged on POD#3 with stable vitals, tolerating PO, voiding spontaneously, and ambulating. Patient to have close outpatient follow-up with Dr. Renee.

## 2020-11-24 NOTE — CHART NOTE - NSCHARTNOTEFT_GEN_A_CORE
The patient was evaluated at bedside for tachycardia to 110s. She received 1uPRBC this morning. She is currently asymptomatic, but has not ambulated. Denies HA, dizziness, lightheadedness, CP, SOB, palpitations, abd pain, heavy vaginal bleeding. Has not yet voided.     Vital Signs Last 24 Hrs  T(C): 37.5 (24 Nov 2020 17:26), Max: 37.5 (24 Nov 2020 17:26)  T(F): 99.5 (24 Nov 2020 17:26), Max: 99.5 (24 Nov 2020 17:26)  HR: 116 (24 Nov 2020 17:40) (84 - 116)  BP: 115/65 (24 Nov 2020 17:26) (92/61 - 115/65)  BP(mean): --  RR: 18 (24 Nov 2020 17:26) (18 - 18)  SpO2: 98% (24 Nov 2020 17:26) (95% - 98%)    Physical:   Gen: NAD, A&Ox3  Abd: Soft, Appropriately tender, non-distended, no guarding/rebound  VE: minimal VB    A/P  -f/u post transfusion CBC  -Continue LR@125  -Monitor VS Q4hr  -Ambulate  -DTV@11pm  -Transfuse as needed if CBC low or symptomatic

## 2020-11-25 DIAGNOSIS — Z98.890 OTHER SPECIFIED POSTPROCEDURAL STATES: ICD-10-CM

## 2020-11-25 LAB
ANION GAP SERPL CALC-SCNC: 10 MMOL/L — SIGNIFICANT CHANGE UP (ref 5–17)
BASOPHILS # BLD AUTO: 0.02 K/UL — SIGNIFICANT CHANGE UP (ref 0–0.2)
BASOPHILS NFR BLD AUTO: 0.2 % — SIGNIFICANT CHANGE UP (ref 0–2)
BLD GP AB SCN SERPL QL: NEGATIVE — SIGNIFICANT CHANGE UP
BUN SERPL-MCNC: 8 MG/DL — SIGNIFICANT CHANGE UP (ref 7–23)
CALCIUM SERPL-MCNC: 8.4 MG/DL — SIGNIFICANT CHANGE UP (ref 8.4–10.5)
CHLORIDE SERPL-SCNC: 107 MMOL/L — SIGNIFICANT CHANGE UP (ref 96–108)
CO2 SERPL-SCNC: 24 MMOL/L — SIGNIFICANT CHANGE UP (ref 22–31)
CREAT SERPL-MCNC: 0.65 MG/DL — SIGNIFICANT CHANGE UP (ref 0.5–1.3)
EOSINOPHIL # BLD AUTO: 0.08 K/UL — SIGNIFICANT CHANGE UP (ref 0–0.5)
EOSINOPHIL NFR BLD AUTO: 0.9 % — SIGNIFICANT CHANGE UP (ref 0–6)
GLUCOSE SERPL-MCNC: 114 MG/DL — HIGH (ref 70–99)
HCT VFR BLD CALC: 23.7 % — LOW (ref 34.5–45)
HCT VFR BLD CALC: 26.1 % — LOW (ref 34.5–45)
HGB BLD-MCNC: 8 G/DL — LOW (ref 11.5–15.5)
HGB BLD-MCNC: 8.8 G/DL — LOW (ref 11.5–15.5)
IMM GRANULOCYTES NFR BLD AUTO: 0.7 % — SIGNIFICANT CHANGE UP (ref 0–1.5)
LYMPHOCYTES # BLD AUTO: 1.79 K/UL — SIGNIFICANT CHANGE UP (ref 1–3.3)
LYMPHOCYTES # BLD AUTO: 20.2 % — SIGNIFICANT CHANGE UP (ref 13–44)
MCHC RBC-ENTMCNC: 30.4 PG — SIGNIFICANT CHANGE UP (ref 27–34)
MCHC RBC-ENTMCNC: 30.5 PG — SIGNIFICANT CHANGE UP (ref 27–34)
MCHC RBC-ENTMCNC: 33.7 GM/DL — SIGNIFICANT CHANGE UP (ref 32–36)
MCHC RBC-ENTMCNC: 33.8 GM/DL — SIGNIFICANT CHANGE UP (ref 32–36)
MCV RBC AUTO: 90.3 FL — SIGNIFICANT CHANGE UP (ref 80–100)
MCV RBC AUTO: 90.5 FL — SIGNIFICANT CHANGE UP (ref 80–100)
MONOCYTES # BLD AUTO: 0.99 K/UL — HIGH (ref 0–0.9)
MONOCYTES NFR BLD AUTO: 11.2 % — SIGNIFICANT CHANGE UP (ref 2–14)
NEUTROPHILS # BLD AUTO: 5.91 K/UL — SIGNIFICANT CHANGE UP (ref 1.8–7.4)
NEUTROPHILS NFR BLD AUTO: 66.8 % — SIGNIFICANT CHANGE UP (ref 43–77)
NRBC # BLD: 0 /100 WBCS — SIGNIFICANT CHANGE UP (ref 0–0)
NRBC # BLD: 0 /100 WBCS — SIGNIFICANT CHANGE UP (ref 0–0)
PLATELET # BLD AUTO: 149 K/UL — LOW (ref 150–400)
PLATELET # BLD AUTO: 180 K/UL — SIGNIFICANT CHANGE UP (ref 150–400)
POTASSIUM SERPL-MCNC: 3.8 MMOL/L — SIGNIFICANT CHANGE UP (ref 3.5–5.3)
POTASSIUM SERPL-SCNC: 3.8 MMOL/L — SIGNIFICANT CHANGE UP (ref 3.5–5.3)
RBC # BLD: 2.62 M/UL — LOW (ref 3.8–5.2)
RBC # BLD: 2.89 M/UL — LOW (ref 3.8–5.2)
RBC # FLD: 14.4 % — SIGNIFICANT CHANGE UP (ref 10.3–14.5)
RBC # FLD: 14.7 % — HIGH (ref 10.3–14.5)
RH IG SCN BLD-IMP: NEGATIVE — SIGNIFICANT CHANGE UP
SODIUM SERPL-SCNC: 141 MMOL/L — SIGNIFICANT CHANGE UP (ref 135–145)
WBC # BLD: 10.32 K/UL — SIGNIFICANT CHANGE UP (ref 3.8–10.5)
WBC # BLD: 8.85 K/UL — SIGNIFICANT CHANGE UP (ref 3.8–10.5)
WBC # FLD AUTO: 10.32 K/UL — SIGNIFICANT CHANGE UP (ref 3.8–10.5)
WBC # FLD AUTO: 8.85 K/UL — SIGNIFICANT CHANGE UP (ref 3.8–10.5)

## 2020-11-25 PROCEDURE — 71275 CT ANGIOGRAPHY CHEST: CPT | Mod: 26

## 2020-11-25 PROCEDURE — 74177 CT ABD & PELVIS W/CONTRAST: CPT | Mod: 26

## 2020-11-25 RX ORDER — ASCORBIC ACID 60 MG
500 TABLET,CHEWABLE ORAL
Refills: 0 | Status: DISCONTINUED | OUTPATIENT
Start: 2020-11-25 | End: 2020-11-26

## 2020-11-25 RX ORDER — DIPHENHYDRAMINE HCL 50 MG
25 CAPSULE ORAL ONCE
Refills: 0 | Status: COMPLETED | OUTPATIENT
Start: 2020-11-25 | End: 2020-11-25

## 2020-11-25 RX ORDER — FERROUS SULFATE 325(65) MG
325 TABLET ORAL
Refills: 0 | Status: DISCONTINUED | OUTPATIENT
Start: 2020-11-25 | End: 2020-11-26

## 2020-11-25 RX ORDER — ACETAMINOPHEN 500 MG
1000 TABLET ORAL ONCE
Refills: 0 | Status: COMPLETED | OUTPATIENT
Start: 2020-11-25 | End: 2020-11-25

## 2020-11-25 RX ORDER — SODIUM CHLORIDE 9 MG/ML
1000 INJECTION, SOLUTION INTRAVENOUS
Refills: 0 | Status: DISCONTINUED | OUTPATIENT
Start: 2020-11-25 | End: 2020-11-26

## 2020-11-25 RX ADMIN — Medication 600 MILLIGRAM(S): at 18:42

## 2020-11-25 RX ADMIN — Medication 600 MILLIGRAM(S): at 19:12

## 2020-11-25 RX ADMIN — SIMETHICONE 80 MILLIGRAM(S): 80 TABLET, CHEWABLE ORAL at 12:24

## 2020-11-25 RX ADMIN — Medication 600 MILLIGRAM(S): at 12:54

## 2020-11-25 RX ADMIN — HEPARIN SODIUM 5000 UNIT(S): 5000 INJECTION INTRAVENOUS; SUBCUTANEOUS at 12:24

## 2020-11-25 RX ADMIN — Medication 975 MILLIGRAM(S): at 09:12

## 2020-11-25 RX ADMIN — Medication 500 MILLIGRAM(S): at 18:42

## 2020-11-25 RX ADMIN — Medication 975 MILLIGRAM(S): at 03:16

## 2020-11-25 RX ADMIN — Medication 975 MILLIGRAM(S): at 14:55

## 2020-11-25 RX ADMIN — Medication 600 MILLIGRAM(S): at 00:29

## 2020-11-25 RX ADMIN — Medication 100 MILLIGRAM(S): at 14:55

## 2020-11-25 RX ADMIN — Medication 600 MILLIGRAM(S): at 05:46

## 2020-11-25 RX ADMIN — Medication 600 MILLIGRAM(S): at 12:24

## 2020-11-25 RX ADMIN — Medication 975 MILLIGRAM(S): at 15:25

## 2020-11-25 RX ADMIN — Medication 100 MILLIGRAM(S): at 21:15

## 2020-11-25 RX ADMIN — Medication 400 MILLIGRAM(S): at 21:45

## 2020-11-25 RX ADMIN — SENNA PLUS 1 TABLET(S): 8.6 TABLET ORAL at 12:24

## 2020-11-25 RX ADMIN — Medication 325 MILLIGRAM(S): at 18:42

## 2020-11-25 RX ADMIN — HEPARIN SODIUM 5000 UNIT(S): 5000 INJECTION INTRAVENOUS; SUBCUTANEOUS at 01:20

## 2020-11-25 RX ADMIN — Medication 975 MILLIGRAM(S): at 08:49

## 2020-11-25 RX ADMIN — Medication 25 MILLIGRAM(S): at 21:44

## 2020-11-25 RX ADMIN — Medication 975 MILLIGRAM(S): at 00:47

## 2020-11-25 NOTE — PROGRESS NOTE ADULT - ASSESSMENT
A/P: Ms. Booth is now POD#2/HD#3 s/p ex-lap, abdominal myomectomy with bilateral QL blocks per anesthesia. Case noteworthy for acute blood loss anemia, EBL 1100cc. Postoperative course c/b acute blood loss anemia, tachycardia and hypoxia. Patient continues to be symptomatically anemic at this time. New productive cough appreciated. AF, VS notable for relative hypotensive (negative orthostatic BPs), remains tachycardic to 110s, hypoxic 94-95% on RA. Abdomen entirely benign, soft, NT, ND, no rebound or guarding. Incision with new overlying erythema. Labs with interval rise in white count, however still wnl. H/H with inappropriate rise after transfusion.     Plan as follows:       #Acute blood loss anemia  -Fe/VitC supplementation  -(11/24) s/p 2u pRBC  -(11/25) s/p 1u pRBC (still running)   -CT A/P to r/o intra-abdominal bleed, will obtain BMP for baseline renal function   -f/u repeat post-transfusion CBC     #Tachycardia/hypoxia  -(11/21) COVID neg  -CTA chest , r/o PE     #Superficial surgical site cellulitis   -start Clindamycin 900mg q8h     #Postop  -continue oral pain medications  -bowel regimen  -heparin SQ BId for DVT ppx    #Dispo  - pending clinical course      D/w Dr. Renee, attending     A/P: Ms. Booth is now POD#2/HD#3 s/p ex-lap, abdominal myomectomy with bilateral QL blocks per anesthesia. Case noteworthy for acute blood loss anemia, EBL 1100cc. Postoperative course c/b acute blood loss anemia, tachycardia and hypoxia. Patient continues to be symptomatically anemic at this time. New productive cough appreciated. AF, VS notable for relative hypotensive (negative orthostatic BPs), remains tachycardic to 110s, hypoxic 94-95% on RA. Abdomen entirely benign, soft, NT, ND, no rebound or guarding. Incision with new overlying erythema. Labs with interval rise in white count, however still wnl. H/H with inappropriate rise after transfusion.     Plan as follows:       #Acute blood loss anemia  -Fe/VitC supplementation  -(11/24) s/p 2u pRBC  -(11/25) s/p 1u pRBC (still running)   -CT A/P to r/o intra-abdominal bleed, will obtain BMP for baseline renal function   -f/u repeat post-transfusion CBC     #Tachycardia/hypoxia/productive cough  -(11/21) COVID neg  -CTA chest , r/o PE     #Superficial surgical site cellulitis   -start Clindamycin 900mg q8h     #Postop  -continue oral pain medications  -bowel regimen  -OOB, ambulating, incentive spirometry  -heparin SQ BId for DVT ppx    #Dispo  - pending clinical course      D/w Dr. Renee, attending

## 2020-11-25 NOTE — PROGRESS NOTE ADULT - ATTENDING COMMENTS
Patient seen and examined, agree with above. Pt POD#2 s/p abdominal myomectomy with EBL of 1100 mL, persistent drop in H&H yesterday resulting in decision to give pRBC, patient with mild tachycardia, although patient has been asymptomatic since surgery with well controlled pain, benign abdominal exam, and no symptoms from her anemia. Drop in H&H likely from surgical blood loss, with tachycardia from resulting anemia. Low suspicion for continued bleeding given patient's normal clinical appearance, lack of symptoms, benign exam. Patient given a 2nd unit of pRBC overnight, will follow up CBC this morning to assess for appropriate rise and continue to monitor.    Domingo Middleton MD

## 2020-11-25 NOTE — PROGRESS NOTE ADULT - ASSESSMENT
A/P: 30y POD#2 s/p abdominal myomectomy with bilateral TAP blocks that was uncomplicated (EBL=1100). Patient is improving symptomatically and hemodynamically stable. Yesterday had inappropriate drop in H/H and was transfused 1U pRBC. Due to inappropriate elevation after transfusion she was given transfused a second 1U pRBC. Pt continues to meet post-operative milestones.      Neuro:   #Post-op pain:  - Scheduled Tylenol, Motrin   - Oxycodone prn  - s/p TAP blocks     CV/Resp:   #Acute blood loss from surgery: Pre-op Hct=49.9. EBL 1100mL. S/p 2U pRBC. Hemodynamically stable. Pt with benign abdominal exam and VSS.   - F/u POD#1 CBCP  - Continue Fe/Vit C supplementation BID     GI/FEN:   F: Saline lock IV.  E: Replete electrolytes PRN  N: Advance to regular diet    : no active issues  - UOP adequate,  voiding spontaneously     Gyn: History of fibroids, now s/p abdominal myomectomy.  - F/u final path    ID: no active issues. afebrile.     Endo: no active issues.     Prophy: c/w SubQ heparin and SCDs. Encourage ambulation and IS.    Dispo: when tolerating PO, pain well-controlled on PO meds, voiding and ambulating.     Sybil Han, PGY-1    D/W Gyn Team   A/P: 30y POD#2 s/p abdominal myomectomy with bilateral TAP blocks that was uncomplicated aside from large EBL (EBL=1100). Patient is improving symptomatically and hemodynamically stable. Yesterday had drop in H/H and was transfused 1U pRBC, with rise in hgb from 7.5 to 8 resulting which when combined with patient's tachycardia resulted in decision to transfuse a 2nd unit overnight. Pt continues to be without complaints and meet post-operative milestones.    Neuro:   #Post-op pain:  - Scheduled Tylenol, Motrin   - Oxycodone prn  - s/p TAP blocks   CV/Resp:   #Acute blood loss from surgery: Pre-op Hct=49.9. EBL 1100mL. S/p 2U pRBC. Hemodynamically stable. Pt with benign abdominal exam and VSS.   - F/u POD#1 CBC  - Continue Fe/Vit C supplementation BID   GI/FEN:   F: Saline lock IV.  E: Replete electrolytes PRN  N: c/w regular diet  : no active issues  - UOP adequate,  voiding spontaneously   ID: no active issues. afebrile.   Endo: no active issues.   Prophy: c/w SubQ heparin and SCDs. Encourage ambulation and IS.  Dispo: continue postoperative care    Sybil Han, PGY-1

## 2020-11-25 NOTE — CHART NOTE - NSCHARTNOTEFT_GEN_A_CORE
Patient seen and evaluated at bedside. SpO2 on routine vitals noted to be 92-95% by nursing.     Patient found sleeping in bed. Denies shortness of breath, difficulty breathing or chest pain. Denies palpitations. Denies lightheadedness dizziness, nausea or emesis. States that she feels well. 1u PRBC actively infusing.     VS  T(C): 37.3 (11-25-20 @ 22:29)  HR: 108 (11-25-20 @ 22:29)  BP: 117/76 (11-25-20 @ 22:29)  RR: 18 (11-25-20 @ 22:29)  SpO2: 94% (11-25-20 @ 22:29)    Exam:   Gen: A&Ox3, NAD   CV: Tachycardic, regular rhythm   Pulm: CTA B/L   Abd: Soft, appropriately tender. No rebound or guarding. No rigidity.     11/25 CTA Chest (preliminary read):   INTERPRETATION:  No pulmonary embolism in the main, left and right pulmonary arteries. Limited evaluation of most of the lobar, segmental and subsegmental pulmonary arteries secondary to suboptimal contrast bolus timing and motion artifact.    Status post myomectomy. Layering complex fluid/hemorrhage in the pelvis, predominantly along the right aspect of the myomatous uterus. Additional surgical/packing material is seen along the anterior aspect of the myomectomy site. CTA bleeding protocol can be obtained for further evaluation as clinically wanted.      A/P: 29y/o F now POD#2 s/p abdominal myomectomy. Patient with decline in Hct, now s/p 3u PRBC, receiving 4th unit at this time. Noted to have SpO2 92-94%, CTA obtained this afternoon with preliminary read as negative for central PE.      - q2hr VS overnight    - Supplemental O2 PRN to keep SpO2 >93%   - Will send COVID-19 PCR     - Will continue to monitor patient vitals and clinical status closely.     d/w Dr. Renee and Dr. Emi Moran, PGY-2

## 2020-11-25 NOTE — PROGRESS NOTE ADULT - SUBJECTIVE AND OBJECTIVE BOX
GYN Progress Note    POD#2   HD#3    Patient seen and examined at bedside by GYN team. Received 2 U of pRBC 1/24 due to drop in H/H. Today patient is doing well with no acute concerns. She reports some mild gas pain overnight, though overall\ pPain well controlled with oral pain medication.  Patient is ambulating and tolerating regular diet.  Patient is passing flatus.   Patient is voiding spontaneously.   Denies CP, SOB, N/V, fevers, and chills.    Vital Signs Last 24 Hours  T(C): 36.7 (11-25-20 @ 05:00), Max: 37.5 (11-24-20 @ 17:26)  HR: 109 (11-25-20 @ 05:00) (101 - 116)  BP: 123/79 (11-25-20 @ 05:00) (93/64 - 123/79)  RR: 18 (11-25-20 @ 05:00) (17 - 20)  SpO2: 96% (11-25-20 @ 05:00) (94% - 98%)    I&O's Summary    23 Nov 2020 07:01  -  24 Nov 2020 07:00  --------------------------------------------------------  IN: 375 mL / OUT: 1860 mL / NET: -1485 mL    24 Nov 2020 07:01  -  25 Nov 2020 06:46  --------------------------------------------------------  IN: 1475 mL / OUT: 1000 mL / NET: 475 mL        Physical Exam:  General: NAD  CV: RRR  Lungs: CTAB  Abdomen: soft, appropriately-tender, softly distended, normoactive bowel sounds  Incision: midline CDI  : no bleeding on pad  Ext: no pain or swelling     Labs:                        8.0    9.68  )-----------( 208      ( 24 Nov 2020 18:35 )             23.1   baso 0.1    eos 0.0    imm gran 0.4    lymph 15.3   mono 13.9   poly 70.3                         7.5    8.11  )-----------( 241      ( 24 Nov 2020 09:00 )             21.9                          8.4    8.04  )-----------( 221      ( 24 Nov 2020 02:16 )             25.0   baso 0.1    eos 0.0    imm gran 0.2    lymph 10.6   mono 11.1   poly 78.0                         11.0   13.74 )-----------( 254      ( 23 Nov 2020 17:05 )             34.0                8.0<L>  9.68  )-----------( 208      ( 11-24 @ 18:35 )             23.1<L>               7.5<L>  8.11  )-----------( 241      ( 11-24 @ 09:00 )             21.9<L>               8.4<L>  8.04  )-----------( 221      ( 11-24 @ 02:16 )             25.0<L>               11.0<L>  13.74<H> )-----------( 254      ( 11-23 @ 17:05 )             34.0<L>                  ABO Interpretation: A (11-23-20)      MEDICATIONS  (STANDING):  acetaminophen   Tablet .. 975 milliGRAM(s) Oral every 6 hours  ascorbic acid 500 milliGRAM(s) Oral daily  ciprofloxacin   IVPB 400 milliGRAM(s) IV Intermittent once  clindamycin IVPB 900 milliGRAM(s) IV Intermittent once  ferrous    sulfate 325 milliGRAM(s) Oral daily  heparin   Injectable 5000 Unit(s) SubCutaneous every 8 hours  ibuprofen  Tablet. 600 milliGRAM(s) Oral every 6 hours  lactated ringers. 1000 milliLiter(s) (125 mL/Hr) IV Continuous <Continuous>  metoclopramide Injectable 10 milliGRAM(s) IV Push once  senna 1 Tablet(s) Oral daily  simethicone 80 milliGRAM(s) Chew daily    MEDICATIONS  (PRN):  ondansetron Injectable 4 milliGRAM(s) IV Push every 6 hours PRN Nausea and/or Vomiting  oxyCODONE    IR 5 milliGRAM(s) Oral every 4 hours PRN Severe Pain (7 - 10)  oxyCODONE    IR 5 milliGRAM(s) Oral every 3 hours PRN Severe Pain (7 - 10)     GYN Progress Note    POD#2   HD#3    Patient seen and examined at bedside by GYN team. Received 2 U of pRBC 11/24 due to drop in H/H. Today patient is doing well with no acute concerns. She reports some mild gas pain overnight, though overall pain well controlled with oral pain medication.  Patient is ambulating without dizziness/lightheadedness and tolerating regular diet.  Patient is passing flatus.   Patient is voiding spontaneously.   Denies CP, SOB, N/V, fevers, and chills.    Vital Signs Last 24 Hours  T(C): 36.7 (11-25-20 @ 05:00), Max: 37.5 (11-24-20 @ 17:26)  HR: 109 (11-25-20 @ 05:00) (101 - 116)  BP: 123/79 (11-25-20 @ 05:00) (93/64 - 123/79)  RR: 18 (11-25-20 @ 05:00) (17 - 20)  SpO2: 96% (11-25-20 @ 05:00) (94% - 98%)    I&O's Summary    23 Nov 2020 07:01  -  24 Nov 2020 07:00  --------------------------------------------------------  IN: 375 mL / OUT: 1860 mL / NET: -1485 mL    24 Nov 2020 07:01  -  25 Nov 2020 06:46  --------------------------------------------------------  IN: 1475 mL / OUT: 1000 mL / NET: 475 mL        Physical Exam:  General: NAD  CV: RRR  Lungs: CTAB  Abdomen: soft, appropriately-tender, softly distended, normoactive bowel sounds  Incision: midline CDI  : no bleeding on pad  Ext: no pain or swelling     Labs:                        8.0    9.68  )-----------( 208      ( 24 Nov 2020 18:35 )             23.1   baso 0.1    eos 0.0    imm gran 0.4    lymph 15.3   mono 13.9   poly 70.3                         7.5    8.11  )-----------( 241      ( 24 Nov 2020 09:00 )             21.9                          8.4    8.04  )-----------( 221      ( 24 Nov 2020 02:16 )             25.0   baso 0.1    eos 0.0    imm gran 0.2    lymph 10.6   mono 11.1   poly 78.0                         11.0   13.74 )-----------( 254      ( 23 Nov 2020 17:05 )             34.0                8.0<L>  9.68  )-----------( 208      ( 11-24 @ 18:35 )             23.1<L>               7.5<L>  8.11  )-----------( 241      ( 11-24 @ 09:00 )             21.9<L>               8.4<L>  8.04  )-----------( 221      ( 11-24 @ 02:16 )             25.0<L>               11.0<L>  13.74<H> )-----------( 254      ( 11-23 @ 17:05 )             34.0<L>                  ABO Interpretation: A (11-23-20)      MEDICATIONS  (STANDING):  acetaminophen   Tablet .. 975 milliGRAM(s) Oral every 6 hours  ascorbic acid 500 milliGRAM(s) Oral daily  ciprofloxacin   IVPB 400 milliGRAM(s) IV Intermittent once  clindamycin IVPB 900 milliGRAM(s) IV Intermittent once  ferrous    sulfate 325 milliGRAM(s) Oral daily  heparin   Injectable 5000 Unit(s) SubCutaneous every 8 hours  ibuprofen  Tablet. 600 milliGRAM(s) Oral every 6 hours  lactated ringers. 1000 milliLiter(s) (125 mL/Hr) IV Continuous <Continuous>  metoclopramide Injectable 10 milliGRAM(s) IV Push once  senna 1 Tablet(s) Oral daily  simethicone 80 milliGRAM(s) Chew daily    MEDICATIONS  (PRN):  ondansetron Injectable 4 milliGRAM(s) IV Push every 6 hours PRN Nausea and/or Vomiting  oxyCODONE    IR 5 milliGRAM(s) Oral every 4 hours PRN Severe Pain (7 - 10)  oxyCODONE    IR 5 milliGRAM(s) Oral every 3 hours PRN Severe Pain (7 - 10)

## 2020-11-25 NOTE — CHART NOTE - NSCHARTNOTEFT_GEN_A_CORE
Patient seen and evaluated at bedside. Patient found face-timing with family, and reports that she feels well. She is ambulating to and from the bathroom, without lightheadedness or dizziness. She denies chest pain or shortness of breath. She notes intermittent coughing. She denies nausea or emesis. She denies abdominal pain.     VS  T(C): 37.1 (11-25-20 @ 18:10)  HR: 108 (11-25-20 @ 18:10)  BP: 115/72 (11-25-20 @ 18:10)  RR: 18 (11-25-20 @ 18:10)  SpO2: 95% (11-25-20 @ 18:10)    Exam:   Gen: A&Ox3, NAD   Abd: Soft, appropriately tender. Nondistended. Incision c/d/i. Slight area of erythema and ecchymosis surrounding incision, no change in borders from outlined area on AM rounds. No warmth. Not raised.   Ext: SCDs in place     Imaging:   < from: CT Abdomen and Pelvis w/ IV Cont (11.25.20 @ 17:56) >    ******PRELIMINARY REPORT******        INTERPRETATION:  No pulmonary embolism in the main, left and right pulmonary arteries. Limited evaluation of most of the lobar, segmental and subsegmental pulmonary arteries secondary to suboptimal contrast bolus timing and motion artifact.    Status postmyomectomy. Layering complex fluid/hemorrhage in the pelvis, predominantly along the right aspect of the myomatous uterus. Additional surgical/packing material is seen along the anterior aspect of the myomectomy site. CTA bleeding protocol can be obtained for further evaluation as clinically wanted.    < end of copied text >        A/P: 31y/o F now POD#2 s/p abdominal myomectomy. Patient with decline in Hct, now s/p 3u PRBC. Imaging findings concerning for possible complex fluid/blood collection in pelvis. Patient with stable clinical status, reassuring physical exam and asymptomatic.    - Will transfuse additional 1u PRBC    - 5AM CBC, Coags, BMP ordered.    - CLD for dinner, NPO after dinner. LR@125cc/hr.    - Holding HSQ.    - Will continue to monitor patient status and vitals closely     Patient seen and evaluated with Dr. Kim   d/w Dr. Víctor Moran, PGY-2 Patient seen and evaluated at bedside. Patient found face-timing with family, and reports that she feels well. She is ambulating to and from the bathroom, without lightheadedness or dizziness. She denies chest pain or shortness of breath. She notes intermittent coughing. She denies nausea or emesis. She denies abdominal pain.     VS  T(C): 37.1 (11-25-20 @ 18:10)  HR: 108 (11-25-20 @ 18:10)  BP: 115/72 (11-25-20 @ 18:10)  RR: 18 (11-25-20 @ 18:10)  SpO2: 95% (11-25-20 @ 18:10)    Exam:   Gen: A&Ox3, NAD   Abd: Soft, appropriately tender. Nondistended. Incision c/d/i. Slight area of erythema and ecchymosis surrounding incision, no change in borders from outlined area on AM rounds. No warmth. Not raised.   Ext: SCDs in place     Imaging:   < from: CT Abdomen and Pelvis w/ IV Cont (11.25.20 @ 17:56) >    ******PRELIMINARY REPORT******        INTERPRETATION:  No pulmonary embolism in the main, left and right pulmonary arteries. Limited evaluation of most of the lobar, segmental and subsegmental pulmonary arteries secondary to suboptimal contrast bolus timing and motion artifact.    Status postmyomectomy. Layering complex fluid/hemorrhage in the pelvis, predominantly along the right aspect of the myomatous uterus. Additional surgical/packing material is seen along the anterior aspect of the myomectomy site. CTA bleeding protocol can be obtained for further evaluation as clinically wanted.    < end of copied text >        A/P: 31y/o F now POD#2 s/p abdominal myomectomy. Patient with decline in Hct, now s/p 3u PRBC. Imaging findings concerning for possible complex fluid/blood collection in pelvis. Patient with stable clinical status, reassuring physical exam and asymptomatic.    - Will transfuse additional 1u PRBC    - 5AM CBC, Coags, BMP ordered.    - CLD for dinner, NPO after dinner. LR@125cc/hr.    - Holding HSQ.    - Will continue to monitor patient status and vitals closely     Patient seen and evaluated with Dr. Mina   d/w Dr. Víctor Moran, PGY-2

## 2020-11-25 NOTE — PROGRESS NOTE ADULT - SUBJECTIVE AND OBJECTIVE BOX
[facility]    S: Patient reports feeling unlike herself. Felt "refreshed" after her first unit of blood yesterday, and was able to sleep last night. Now she feels like her heart is beating out of her chest and is beating fast. (+) mild headache and feels tired. Pain well controlled. +Ambulating without lightheadedness or dizziness. +Flatus, no BM. Tolerating regular diet, and just had a burger without issue.       O:  T(C): 37.1 (11-25-20 @ 11:10), Max: 37.5 (11-24-20 @ 17:26)  HR: 110 (11-25-20 @ 11:10) (106 - 116)  BP: 108/73 (11-25-20 @ 11:10) (100/63 - 123/79)  RR: 20 (11-25-20 @ 11:10) (17 - 20)  SpO2: 95% (11-25-20 @ 11:10) (94% - 98%)      11-24-20 @ 07:01  -  11-25-20 @ 07:00  --------------------------------------------------------  IN: 1475 mL / OUT: 1000 mL / NET: 475 mL    11-25-20 @ 07:01  -  11-25-20 @ 13:44  --------------------------------------------------------  IN: 0 mL / OUT: 400 mL / NET: -400 mL        Physical Exam:  Gen: AAOx3  CV: s1s2+ RRR, HR 104bpm manual   Pulm: CTAB upper chest, however lower lung bases with poor air movement and possible crackles, incentive spirometry performed and patient only able to take deep breaths to 1000 estefany.   Abd: soft, NT, ND, no rebound or guarding  Incision: midline vertical with blood stained (minimal) steri strips, overlying nonblanching erythema appreciated and extending past the steri-strips, demarcated.  LE: no c/c/e        TRANSFUSIONS:  (11/24) s/p 2u pRBC  (11/25) s/p 1u pRBC (still running)       LABS                          8.0    10.32 )-----------( 149      ( 11-25 @ 07:03 )             23.7                8.0    9.68  )-----------( 208      ( 11-24 @ 18:35 )             23.1                7.5    8.11  )-----------( 241      ( 11-24 @ 09:00 )             21.9                8.4    8.04  )-----------( 221      ( 11-24 @ 02:16 )             25.0                11.0   13.74 )-----------( 254      ( 11-23 @ 17:05 )             34.0       MICRO  - (11/21) COVID neg     IMAGING  (11/25) Bedside abdominal ultrasound - poor views due to body habitus; no free fluid in the upper abdomens, uterus 12x7cm, LLQ 4cm fluid collection, loculated      [facility]    S: Patient reports feeling unlike herself. Felt "refreshed" after her first unit of blood yesterday, and was able to sleep last night. Now she feels like her heart is beating out of her chest and is beating fast. (+) mild headache and feels tired. Pain well controlled. +Ambulating without lightheadedness or dizziness. +Flatus, no BM. Tolerating regular diet, and just had a burger without issue. New productive cough, non-bloody.      O:  T(C): 37.1 (11-25-20 @ 11:10), Max: 37.5 (11-24-20 @ 17:26)  HR: 110 (11-25-20 @ 11:10) (106 - 116)  BP: 108/73 (11-25-20 @ 11:10) (100/63 - 123/79)  RR: 20 (11-25-20 @ 11:10) (17 - 20)  SpO2: 95% (11-25-20 @ 11:10) (94% - 98%)      11-24-20 @ 07:01  -  11-25-20 @ 07:00  --------------------------------------------------------  IN: 1475 mL / OUT: 1000 mL / NET: 475 mL    11-25-20 @ 07:01  -  11-25-20 @ 13:44  --------------------------------------------------------  IN: 0 mL / OUT: 400 mL / NET: -400 mL        Physical Exam:  Gen: AAOx3  CV: s1s2+ RRR, HR 104bpm manual   Pulm: CTAB upper chest, however lower lung bases with poor air movement and possible crackles, incentive spirometry performed and patient only able to take deep breaths to 1000 estefany.   Abd: soft, NT, ND, no rebound or guarding  Incision: midline vertical with blood stained (minimal) steri strips, overlying nonblanching erythema appreciated and extending past the steri-strips, demarcated.  LE: no c/c/e        TRANSFUSIONS:  (11/24) s/p 2u pRBC  (11/25) s/p 1u pRBC (still running)       LABS                          8.0    10.32 )-----------( 149      ( 11-25 @ 07:03 )             23.7                8.0    9.68  )-----------( 208      ( 11-24 @ 18:35 )             23.1                7.5    8.11  )-----------( 241      ( 11-24 @ 09:00 )             21.9                8.4    8.04  )-----------( 221      ( 11-24 @ 02:16 )             25.0                11.0   13.74 )-----------( 254      ( 11-23 @ 17:05 )             34.0       MICRO  - (11/21) COVID neg     IMAGING  (11/25) Bedside abdominal ultrasound - poor views due to body habitus; no free fluid in the upper abdomens, uterus 12x7cm, LLQ 4cm fluid collection, loculated

## 2020-11-25 NOTE — PROVIDER CONTACT NOTE (OTHER) - ASSESSMENT
Patient denies chest pain , shortness of breath. Lungs clear bilaterally. Blood transfusion in progress

## 2020-11-26 ENCOUNTER — TRANSCRIPTION ENCOUNTER (OUTPATIENT)
Age: 30
End: 2020-11-26

## 2020-11-26 VITALS
RESPIRATION RATE: 18 BRPM | OXYGEN SATURATION: 98 % | TEMPERATURE: 98 F | DIASTOLIC BLOOD PRESSURE: 70 MMHG | SYSTOLIC BLOOD PRESSURE: 117 MMHG | HEART RATE: 98 BPM

## 2020-11-26 LAB
ANION GAP SERPL CALC-SCNC: 11 MMOL/L — SIGNIFICANT CHANGE UP (ref 5–17)
APTT BLD: 23.9 SEC — LOW (ref 27.5–35.5)
BASOPHILS # BLD AUTO: 0.03 K/UL — SIGNIFICANT CHANGE UP (ref 0–0.2)
BASOPHILS NFR BLD AUTO: 0.3 % — SIGNIFICANT CHANGE UP (ref 0–2)
BUN SERPL-MCNC: 5 MG/DL — LOW (ref 7–23)
CALCIUM SERPL-MCNC: 8.4 MG/DL — SIGNIFICANT CHANGE UP (ref 8.4–10.5)
CHLORIDE SERPL-SCNC: 107 MMOL/L — SIGNIFICANT CHANGE UP (ref 96–108)
CO2 SERPL-SCNC: 24 MMOL/L — SIGNIFICANT CHANGE UP (ref 22–31)
CREAT SERPL-MCNC: 0.59 MG/DL — SIGNIFICANT CHANGE UP (ref 0.5–1.3)
EOSINOPHIL # BLD AUTO: 0.15 K/UL — SIGNIFICANT CHANGE UP (ref 0–0.5)
EOSINOPHIL NFR BLD AUTO: 1.6 % — SIGNIFICANT CHANGE UP (ref 0–6)
FIBRINOGEN PPP-MCNC: 577 MG/DL — HIGH (ref 290–520)
GLUCOSE SERPL-MCNC: 93 MG/DL — SIGNIFICANT CHANGE UP (ref 70–99)
HCT VFR BLD CALC: 27.4 % — LOW (ref 34.5–45)
HCT VFR BLD CALC: 27.9 % — LOW (ref 34.5–45)
HCT VFR BLD CALC: 29.1 % — LOW (ref 34.5–45)
HGB BLD-MCNC: 9.4 G/DL — LOW (ref 11.5–15.5)
HGB BLD-MCNC: 9.7 G/DL — LOW (ref 11.5–15.5)
HGB BLD-MCNC: 9.7 G/DL — LOW (ref 11.5–15.5)
IMM GRANULOCYTES NFR BLD AUTO: 0.8 % — SIGNIFICANT CHANGE UP (ref 0–1.5)
INR BLD: 0.97 RATIO — SIGNIFICANT CHANGE UP (ref 0.88–1.16)
LYMPHOCYTES # BLD AUTO: 1.94 K/UL — SIGNIFICANT CHANGE UP (ref 1–3.3)
LYMPHOCYTES # BLD AUTO: 21.3 % — SIGNIFICANT CHANGE UP (ref 13–44)
MCHC RBC-ENTMCNC: 30.3 PG — SIGNIFICANT CHANGE UP (ref 27–34)
MCHC RBC-ENTMCNC: 30.9 PG — SIGNIFICANT CHANGE UP (ref 27–34)
MCHC RBC-ENTMCNC: 31.1 PG — SIGNIFICANT CHANGE UP (ref 27–34)
MCHC RBC-ENTMCNC: 33.3 GM/DL — SIGNIFICANT CHANGE UP (ref 32–36)
MCHC RBC-ENTMCNC: 34.3 GM/DL — SIGNIFICANT CHANGE UP (ref 32–36)
MCHC RBC-ENTMCNC: 34.8 GM/DL — SIGNIFICANT CHANGE UP (ref 32–36)
MCV RBC AUTO: 89.4 FL — SIGNIFICANT CHANGE UP (ref 80–100)
MCV RBC AUTO: 90.1 FL — SIGNIFICANT CHANGE UP (ref 80–100)
MCV RBC AUTO: 90.9 FL — SIGNIFICANT CHANGE UP (ref 80–100)
MONOCYTES # BLD AUTO: 0.98 K/UL — HIGH (ref 0–0.9)
MONOCYTES NFR BLD AUTO: 10.7 % — SIGNIFICANT CHANGE UP (ref 2–14)
NEUTROPHILS # BLD AUTO: 5.95 K/UL — SIGNIFICANT CHANGE UP (ref 1.8–7.4)
NEUTROPHILS NFR BLD AUTO: 65.3 % — SIGNIFICANT CHANGE UP (ref 43–77)
NRBC # BLD: 0 /100 WBCS — SIGNIFICANT CHANGE UP (ref 0–0)
PLATELET # BLD AUTO: 172 K/UL — SIGNIFICANT CHANGE UP (ref 150–400)
PLATELET # BLD AUTO: 197 K/UL — SIGNIFICANT CHANGE UP (ref 150–400)
PLATELET # BLD AUTO: 198 K/UL — SIGNIFICANT CHANGE UP (ref 150–400)
POTASSIUM SERPL-MCNC: 3.4 MMOL/L — LOW (ref 3.5–5.3)
POTASSIUM SERPL-SCNC: 3.4 MMOL/L — LOW (ref 3.5–5.3)
PROTHROM AB SERPL-ACNC: 11.7 SEC — SIGNIFICANT CHANGE UP (ref 10.6–13.6)
RBC # BLD: 3.04 M/UL — LOW (ref 3.8–5.2)
RBC # BLD: 3.12 M/UL — LOW (ref 3.8–5.2)
RBC # BLD: 3.2 M/UL — LOW (ref 3.8–5.2)
RBC # FLD: 14.6 % — HIGH (ref 10.3–14.5)
SARS-COV-2 RNA SPEC QL NAA+PROBE: SIGNIFICANT CHANGE UP
SODIUM SERPL-SCNC: 142 MMOL/L — SIGNIFICANT CHANGE UP (ref 135–145)
WBC # BLD: 9.12 K/UL — SIGNIFICANT CHANGE UP (ref 3.8–10.5)
WBC # BLD: 9.13 K/UL — SIGNIFICANT CHANGE UP (ref 3.8–10.5)
WBC # BLD: 9.27 K/UL — SIGNIFICANT CHANGE UP (ref 3.8–10.5)
WBC # FLD AUTO: 9.12 K/UL — SIGNIFICANT CHANGE UP (ref 3.8–10.5)
WBC # FLD AUTO: 9.13 K/UL — SIGNIFICANT CHANGE UP (ref 3.8–10.5)
WBC # FLD AUTO: 9.27 K/UL — SIGNIFICANT CHANGE UP (ref 3.8–10.5)

## 2020-11-26 PROCEDURE — 86923 COMPATIBILITY TEST ELECTRIC: CPT

## 2020-11-26 PROCEDURE — 86850 RBC ANTIBODY SCREEN: CPT

## 2020-11-26 PROCEDURE — 88305 TISSUE EXAM BY PATHOLOGIST: CPT

## 2020-11-26 PROCEDURE — P9045: CPT

## 2020-11-26 PROCEDURE — 74177 CT ABD & PELVIS W/CONTRAST: CPT

## 2020-11-26 PROCEDURE — C9399: CPT

## 2020-11-26 PROCEDURE — 74177 CT ABD & PELVIS W/CONTRAST: CPT | Mod: 26

## 2020-11-26 PROCEDURE — P9041: CPT

## 2020-11-26 PROCEDURE — 85730 THROMBOPLASTIN TIME PARTIAL: CPT

## 2020-11-26 PROCEDURE — 71275 CT ANGIOGRAPHY CHEST: CPT

## 2020-11-26 PROCEDURE — 85384 FIBRINOGEN ACTIVITY: CPT

## 2020-11-26 PROCEDURE — U0003: CPT

## 2020-11-26 PROCEDURE — 86901 BLOOD TYPING SEROLOGIC RH(D): CPT

## 2020-11-26 PROCEDURE — 80048 BASIC METABOLIC PNL TOTAL CA: CPT

## 2020-11-26 PROCEDURE — 85025 COMPLETE CBC W/AUTO DIFF WBC: CPT

## 2020-11-26 PROCEDURE — 86900 BLOOD TYPING SEROLOGIC ABO: CPT

## 2020-11-26 PROCEDURE — 36430 TRANSFUSION BLD/BLD COMPNT: CPT

## 2020-11-26 PROCEDURE — P9016: CPT

## 2020-11-26 PROCEDURE — 85610 PROTHROMBIN TIME: CPT

## 2020-11-26 PROCEDURE — C1889: CPT

## 2020-11-26 PROCEDURE — 81025 URINE PREGNANCY TEST: CPT

## 2020-11-26 PROCEDURE — 85027 COMPLETE CBC AUTOMATED: CPT

## 2020-11-26 RX ORDER — FERROUS SULFATE 325(65) MG
1 TABLET ORAL
Qty: 90 | Refills: 0
Start: 2020-11-26 | End: 2020-12-25

## 2020-11-26 RX ORDER — DOCUSATE SODIUM 100 MG
1 CAPSULE ORAL
Qty: 30 | Refills: 0
Start: 2020-11-26 | End: 2020-12-25

## 2020-11-26 RX ORDER — AZTREONAM 2 G
1 VIAL (EA) INJECTION
Qty: 6 | Refills: 0
Start: 2020-11-26 | End: 2020-11-28

## 2020-11-26 RX ORDER — IBUPROFEN 200 MG
600 TABLET ORAL EVERY 6 HOURS
Refills: 0 | Status: DISCONTINUED | OUTPATIENT
Start: 2020-11-26 | End: 2020-11-26

## 2020-11-26 RX ORDER — OXYCODONE HYDROCHLORIDE 5 MG/1
1 TABLET ORAL
Qty: 10 | Refills: 0
Start: 2020-11-26

## 2020-11-26 RX ORDER — HEPARIN SODIUM 5000 [USP'U]/ML
5000 INJECTION INTRAVENOUS; SUBCUTANEOUS EVERY 12 HOURS
Refills: 0 | Status: DISCONTINUED | OUTPATIENT
Start: 2020-11-26 | End: 2020-11-26

## 2020-11-26 RX ORDER — OXYCODONE HYDROCHLORIDE 5 MG/1
5 TABLET ORAL EVERY 6 HOURS
Refills: 0 | Status: DISCONTINUED | OUTPATIENT
Start: 2020-11-26 | End: 2020-11-26

## 2020-11-26 RX ORDER — IBUPROFEN 200 MG
1 TABLET ORAL
Qty: 0 | Refills: 0 | DISCHARGE
Start: 2020-11-26

## 2020-11-26 RX ORDER — ACETAMINOPHEN 500 MG
975 TABLET ORAL EVERY 6 HOURS
Refills: 0 | Status: DISCONTINUED | OUTPATIENT
Start: 2020-11-26 | End: 2020-11-26

## 2020-11-26 RX ORDER — ACETAMINOPHEN 500 MG
3 TABLET ORAL
Qty: 0 | Refills: 0 | DISCHARGE
Start: 2020-11-26

## 2020-11-26 RX ORDER — POTASSIUM CHLORIDE 20 MEQ
20 PACKET (EA) ORAL
Refills: 0 | Status: COMPLETED | OUTPATIENT
Start: 2020-11-26 | End: 2020-11-26

## 2020-11-26 RX ADMIN — Medication 600 MILLIGRAM(S): at 17:49

## 2020-11-26 RX ADMIN — SENNA PLUS 1 TABLET(S): 8.6 TABLET ORAL at 12:59

## 2020-11-26 RX ADMIN — Medication 20 MILLIEQUIVALENT(S): at 17:48

## 2020-11-26 RX ADMIN — Medication 325 MILLIGRAM(S): at 05:05

## 2020-11-26 RX ADMIN — Medication 100 MILLIGRAM(S): at 05:04

## 2020-11-26 RX ADMIN — HEPARIN SODIUM 5000 UNIT(S): 5000 INJECTION INTRAVENOUS; SUBCUTANEOUS at 12:54

## 2020-11-26 RX ADMIN — Medication 100 MILLIGRAM(S): at 14:39

## 2020-11-26 RX ADMIN — SIMETHICONE 80 MILLIGRAM(S): 80 TABLET, CHEWABLE ORAL at 12:54

## 2020-11-26 RX ADMIN — Medication 600 MILLIGRAM(S): at 13:24

## 2020-11-26 RX ADMIN — Medication 500 MILLIGRAM(S): at 05:05

## 2020-11-26 RX ADMIN — Medication 20 MILLIEQUIVALENT(S): at 14:39

## 2020-11-26 RX ADMIN — Medication 500 MILLIGRAM(S): at 17:49

## 2020-11-26 RX ADMIN — Medication 600 MILLIGRAM(S): at 12:54

## 2020-11-26 RX ADMIN — SODIUM CHLORIDE 125 MILLILITER(S): 9 INJECTION, SOLUTION INTRAVENOUS at 05:04

## 2020-11-26 RX ADMIN — Medication 325 MILLIGRAM(S): at 17:48

## 2020-11-26 NOTE — PROGRESS NOTE ADULT - SUBJECTIVE AND OBJECTIVE BOX
Gyn Progress Note POD #3    Subjective:   Pt seen and examined at bedside. Overnight pt desaturated to 90% for a short interval of time but otherwise has been over 92%. Pain well controlled. Patient ambulating.  Pt denies fever, chills, chest pain, SOB, nausea, vomiting, lightheadedness, dizziness.      Objective:  T(F): 98.3 (11-26-20 @ 05:00), Max: 99.2 (11-25-20 @ 22:29)  HR: 100 (11-26-20 @ 05:00) (100 - 112)  BP: 115/79 (11-26-20 @ 05:00) (108/73 - 124/85)  RR: 18 (11-26-20 @ 05:00) (18 - 20)  SpO2: 94% (11-26-20 @ 05:00) (92% - 96%)  Wt(kg): --  I&O's Summary    24 Nov 2020 07:01 - 25 Nov 2020 07:00  --------------------------------------------------------  IN: 1475 mL / OUT: 1000 mL / NET: 475 mL    25 Nov 2020 07:01 - 26 Nov 2020 06:30  --------------------------------------------------------  IN: 1722.6 mL / OUT: 1570 mL / NET: 152.6 mL        MEDICATIONS  (STANDING):  ascorbic acid 500 milliGRAM(s) Oral two times a day  clindamycin IVPB 900 milliGRAM(s) IV Intermittent once  clindamycin IVPB      clindamycin IVPB 900 milliGRAM(s) IV Intermittent every 8 hours  ferrous    sulfate 325 milliGRAM(s) Oral two times a day  lactated ringers. 1000 milliLiter(s) (125 mL/Hr) IV Continuous <Continuous>  metoclopramide Injectable 10 milliGRAM(s) IV Push once  senna 1 Tablet(s) Oral daily  simethicone 80 milliGRAM(s) Chew daily    MEDICATIONS  (PRN):  ondansetron Injectable 4 milliGRAM(s) IV Push every 6 hours PRN Nausea and/or Vomiting      Physical Exam:  Constitutional: NAD, A+O x3  CV: RRR  Lungs: clear to auscultation bilaterally  Abdomen: soft, nondistended, no guarding, no rebound, normal bowel sounds  Incision: clean, dry, intact. Demarcated site previously outlined of possible cellulitis decreased.  Extremities: no lower extremity edema or calf tenderness bilaterally    LABS:            8.8      8.85 )------------( 180        ( 11-25-20 @ 18:52 )            26.1            8.0      10.32 )------------( 149        ( 11-25-20 @ 07:03 )            23.7    11-25    141    |  107    |  8      ----------------------------<  114<H>  3.8     |  24     |  0.65     Ca    8.4        25 Nov 2020 14:13      < from: CT Abdomen and Pelvis w/ IV Cont (11.25.20 @ 17:56) >    ******PRELIMINARY REPORT******    ******PRELIMINARY REPORT******          EXAM:  CT ABDOMEN AND PELVIS IC                            PROCEDURE DATE:  11/25/2020      ******PRELIMINARY REPORT******    ******PRELIMINARY REPORT******              INTERPRETATION:  No pulmonary embolism in the main, left and right pulmonary arteries. Limited evaluation of most of the lobar, segmental and subsegmental pulmonary arteries secondary to suboptimal contrast bolus timing and motion artifact.    Status postmyomectomy. Layering complex fluid/hemorrhage in the pelvis, predominantly along the right aspect of the myomatous uterus. Additional surgical/packing material is seen along the anterior aspect of the myomectomy site. CTA bleeding protocol can be obtained for further evaluation as clinically wanted.    Findings discussed by Dr. Henry to Nicole Burton and Víctor on 11/25/2020 at 6:21 PM.            ******PRELIMINARY REPORT******    ******PRELIMINARY REPORT******          RACHEL HENRY MD; Resident Radiology    < end of copied text >           Gyn Progress Note POD #3    Subjective:   Pt seen and examined at bedside. Overnight pt desaturated to 90% for a short interval of time but otherwise has been over 92%. Pain well controlled. Patient ambulating.  Pt denies fever, chills, chest pain, SOB, nausea, vomiting, lightheadedness, dizziness.      Objective:  T(F): 98.3 (11-26-20 @ 05:00), Max: 99.2 (11-25-20 @ 22:29)  HR: 100 (11-26-20 @ 05:00) (100 - 112)  BP: 115/79 (11-26-20 @ 05:00) (108/73 - 124/85)  RR: 18 (11-26-20 @ 05:00) (18 - 20)  SpO2: 94% (11-26-20 @ 05:00) (92% - 96%)  Wt(kg): --  I&O's Summary    24 Nov 2020 07:01 - 25 Nov 2020 07:00  --------------------------------------------------------  IN: 1475 mL / OUT: 1000 mL / NET: 475 mL    25 Nov 2020 07:01 - 26 Nov 2020 06:30  --------------------------------------------------------  IN: 1722.6 mL / OUT: 1570 mL / NET: 152.6 mL        MEDICATIONS  (STANDING):  ascorbic acid 500 milliGRAM(s) Oral two times a day  clindamycin IVPB 900 milliGRAM(s) IV Intermittent once  clindamycin IVPB      clindamycin IVPB 900 milliGRAM(s) IV Intermittent every 8 hours  ferrous    sulfate 325 milliGRAM(s) Oral two times a day  lactated ringers. 1000 milliLiter(s) (125 mL/Hr) IV Continuous <Continuous>  metoclopramide Injectable 10 milliGRAM(s) IV Push once  senna 1 Tablet(s) Oral daily  simethicone 80 milliGRAM(s) Chew daily    MEDICATIONS  (PRN):  ondansetron Injectable 4 milliGRAM(s) IV Push every 6 hours PRN Nausea and/or Vomiting      Physical Exam:  Constitutional: NAD, A+O x3  CV: RRR  Lungs: clear to auscultation bilaterally  Abdomen: soft, nondistended, no guarding, no rebound, normal bowel sounds  Incision: clean, dry, intact. Demarcated site previously outlined of possible cellulitis decreased.  Extremities: no lower extremity edema or calf tenderness bilaterally    LABS:             9.4    9.12  )-----------( 172      ( 11-26 @ 06:52 )             27.4                8.8    8.85  )-----------( 180      ( 11-25 @ 18:52 )             26.1                8.0    10.32 )-----------( 149      ( 11-25 @ 07:03 )             23.7                8.0    9.68  )-----------( 208      ( 11-24 @ 18:35 )             23.1                7.5    8.11  )-----------( 241      ( 11-24 @ 09:00 )             21.9                8.4    8.04  )-----------( 221      ( 11-24 @ 02:16 )             25.0                11.0   13.74 )-----------( 254      ( 11-23 @ 17:05 )             34.0       11-25    141    |  107    |  8      ----------------------------<  114<H>  3.8     |  24     |  0.65     Ca    8.4        25 Nov 2020 14:13      < from: CT Abdomen and Pelvis w/ IV Cont (11.25.20 @ 17:56) >  FINDINGS:  CHEST:  LUNGS AND LARGE AIRWAYS: Patent central airways. No pulmonary nodules. Bibasilar atelectasis with associated trace bilateral pleural effusions.  PLEURA: Trace bilateral pleural effusions.  VESSELS: Limited evaluation of the subsegmental pulmonary arteries secondary to patient's body habitus. There is no evidence of central or segmental pulmonary emboli.  HEART: Heart size is normal. No pericardial effusion.  MEDIASTINUM AND CAMILO: No lymphadenopathy.  CHEST WALL AND LOWER NECK: Within normal limits.    ABDOMEN AND PELVIS:  LIVER: Within normal limits.  BILE DUCTS: Normal caliber.  GALLBLADDER: Within normal limits.  SPLEEN: Within normal limits.  PANCREAS: Within normal limits.  ADRENALS: Within normal limits.  KIDNEYS/URETERS: Within normal limits.    BLADDER: Within normal limits.  REPRODUCTIVE ORGANS: Patient status post resection of the previously noted large myomectomy. Intrauterine device is in place. There is large hematoma in the pelvis extension from the right aspect of the myometrium measuring 12.5 x 8.4 x 1.9 cm. There is small amount of high attenuation fluid tracking within the retroperitoneal space and small amount of ascites related to hemoperitoneum. Surgical is noted adjacent to the hematoma. Dilated right gonadal vein are noted in the right adnexa. There is no definite evidence of acute contrast extravasation.    BOWEL: No bowel obstruction.  PERITONEUM: Small amount of ascites.  VESSELS: Dilated right gonadal veins.  RETROPERITONEUM/LYMPH NODES: no lymphadenopathy.  ABDOMINAL WALL: Within normal limits.  BONES: Within normal limits.    IMPRESSION:  No CT evidence of pulmonary emboli.    Bibasilar atelectasis with associated trace bilateral effusions.    Large pelvic hematoma and small amount of ascites which may be secondary to hemoperitoneum. No acute contrast extravasation is identified this time. If indicated further evaluation may be performed with CT angiography of the pelvis.    < end of copied text >

## 2020-11-26 NOTE — DISCHARGE NOTE NURSING/CASE MANAGEMENT/SOCIAL WORK - PATIENT PORTAL LINK FT
You can access the FollowMyHealth Patient Portal offered by [facility] by registering at the following website: [email or web address]. By joining [facility]’s FollowMyHealth portal, you will also be able to view your health information using other applications (apps) compatible with our system. Doesn't remember

## 2020-11-26 NOTE — PROGRESS NOTE ADULT - ATTENDING COMMENTS
pt seen and examined. am cbc with hgb 9.4 from 8.8 yesterday. given sluggish response to 1u prbc last night, will rpt CT scan this morning to observe for stability of hematoma. if hematoma stable on imaging with repeat bloods at 1pm. clinical impression is that pt is still equilibrating and no active bleeding at this time.     ANT Renee

## 2020-11-26 NOTE — PROGRESS NOTE ADULT - ASSESSMENT
A/P: 30y POD#3 s/p abdominal myomectomy with bilateral TAP blocks that was uncomplicated aside from large EBL (EBL=1100). Patient is improving symptomatically and hemodynamically stable. Post-operative course complicated by dropping H/H resulting in total of 4uPRBC transfused. Pt was also tachycardic on POD2 therefore concern for active bleeding and PE resulted in CTAP and CTA, final read pending. After 4th unit, Hgb lala appropriately and patient clinically appears to be improving.  Neuro:   #Post-op pain:  - Scheduled Tylenol, Motrin   - Oxycodone prn  - s/p TAP blocks   CV/Resp:   #Acute blood loss from surgery: Pre-op Hct=49.9. EBL 1100mL. S/p 4U pRBC. Hemodynamically stable. Pt with benign abdominal exam and VSS.   - F/u AM CBC  - Continue Fe/Vit C supplementation BID   - COVID 11/25 pending  GI/FEN:   F: LR@125  E: Replete electrolytes PRN, AM BMP pending  N: NPO, will re-evaluate after AM Labs  : no active issues  - UOP adequate,  voiding spontaneously   ID: Possible incisional cellulitis now improving.  - cw Clindamycin  Endo: no active issues.   Prophy:  Encourage ambulation and IS. HSQ on hold 2/2 concern for bleeding.  Dispo: continue postoperative care    Romero Alcantara PGY2 A/P: 30y POD#3 s/p abdominal myomectomy with bilateral TAP blocks that was uncomplicated aside from large EBL (EBL=1100). Patient is improving symptomatically and hemodynamically stable. Post-operative course complicated by dropping H/H resulting in total of 4uPRBC transfused. Pt was also tachycardic on POD2 therefore concern for active bleeding and PE resulted in CTAP and CTA, pelvic hematoma seen, no active extravasation noted, no evidence of PE, just atelectasis and trace effusions. After 4th unit, Hgb rise form 8.8 to 9.4, slightly less than expected.  Neuro:   #Post-op pain:  - Scheduled Tylenol, Motrin   - Oxycodone prn  - s/p TAP blocks   CV/Resp:   #Acute blood loss from surgery: Pre-op Hct=49.9. EBL 1100mL. S/p 4U pRBC. Hemodynamically stable. Pt with benign abdominal exam and vitals normal. CT with atelectasis and pelvic hematoma  - AM H&H with rise less than expected after unit overnight; given this and concern for possible bleeding, will pursue CT abdomen/pelvis with angiography today to see if any vessels are actively bleeding for possible intervention  - Continue Fe/Vit C supplementation BID   - COVID 11/25 neg  GI/FEN:   F: LR@125  E: Replete electrolytes PRN, will give po potassium pending CT scan today  N: NPO, will re-evaluate after CT  : no active issues  - UOP adequate,  voiding spontaneously  ID: Possible incisional cellulitis now improving.  - cw Clindamycin  Endo: no active issues.   Prophy:  Encourage ambulation and IS. HSQ on hold 2/2 concern for bleeding.  Dispo: continue postoperative care    Romero Alcantara PGY2      Patient seen and examined, agree with above. Pt clinically still looks well, tachycardia improved today, pt still asymptomatic. H&H did not rise as expected after 1U pRBC last night, will proceed with CT angio of abdomen/pelvis today to more definitively evaluate for bleeding. If extravasating vessel identified, will likely pursue IR intervention, if not, most likely will continue to trend H&H as pt clinically looks well. Regarding O2 saturation, likely secondary to atelectasis. I performed bedside lung sono to assess for edema given additional fluid sand pRBC overnight, no b lines were present, no evidence of edema. Will continue to monitor. Further plan today pending CT scan    Domingo Middleton, PGY-5

## 2020-11-27 ENCOUNTER — INPATIENT (INPATIENT)
Facility: HOSPITAL | Age: 30
LOS: 6 days | Discharge: HOME | DRG: 862 | End: 2020-12-04
Attending: STUDENT IN AN ORGANIZED HEALTH CARE EDUCATION/TRAINING PROGRAM | Admitting: STUDENT IN AN ORGANIZED HEALTH CARE EDUCATION/TRAINING PROGRAM
Payer: COMMERCIAL

## 2020-11-27 VITALS
HEART RATE: 112 BPM | DIASTOLIC BLOOD PRESSURE: 81 MMHG | RESPIRATION RATE: 18 BRPM | OXYGEN SATURATION: 97 % | HEIGHT: 65 IN | SYSTOLIC BLOOD PRESSURE: 141 MMHG | TEMPERATURE: 100 F

## 2020-11-27 DIAGNOSIS — Z98.890 OTHER SPECIFIED POSTPROCEDURAL STATES: Chronic | ICD-10-CM

## 2020-11-27 LAB
ALBUMIN SERPL ELPH-MCNC: 3.7 G/DL — SIGNIFICANT CHANGE UP (ref 3.3–5)
ALP SERPL-CCNC: 135 U/L — HIGH (ref 40–120)
ALT FLD-CCNC: 88 U/L — HIGH (ref 10–45)
ANION GAP SERPL CALC-SCNC: 12 MMOL/L — SIGNIFICANT CHANGE UP (ref 5–17)
APTT BLD: 27.8 SEC — SIGNIFICANT CHANGE UP (ref 27.5–35.5)
AST SERPL-CCNC: 103 U/L — HIGH (ref 10–40)
BASE EXCESS BLDV CALC-SCNC: 0.6 MMOL/L — SIGNIFICANT CHANGE UP (ref -2–2)
BASOPHILS # BLD AUTO: 0.03 K/UL — SIGNIFICANT CHANGE UP (ref 0–0.2)
BASOPHILS NFR BLD AUTO: 0.3 % — SIGNIFICANT CHANGE UP (ref 0–2)
BILIRUB SERPL-MCNC: 0.6 MG/DL — SIGNIFICANT CHANGE UP (ref 0.2–1.2)
BUN SERPL-MCNC: 6 MG/DL — LOW (ref 7–23)
CA-I SERPL-SCNC: 1.24 MMOL/L — SIGNIFICANT CHANGE UP (ref 1.12–1.3)
CALCIUM SERPL-MCNC: 9.6 MG/DL — SIGNIFICANT CHANGE UP (ref 8.4–10.5)
CHLORIDE BLDV-SCNC: 105 MMOL/L — SIGNIFICANT CHANGE UP (ref 96–108)
CHLORIDE SERPL-SCNC: 104 MMOL/L — SIGNIFICANT CHANGE UP (ref 96–108)
CO2 BLDV-SCNC: 25 MMOL/L — SIGNIFICANT CHANGE UP (ref 22–30)
CO2 SERPL-SCNC: 22 MMOL/L — SIGNIFICANT CHANGE UP (ref 22–31)
CREAT SERPL-MCNC: 0.6 MG/DL — SIGNIFICANT CHANGE UP (ref 0.5–1.3)
EOSINOPHIL # BLD AUTO: 0.27 K/UL — SIGNIFICANT CHANGE UP (ref 0–0.5)
EOSINOPHIL NFR BLD AUTO: 2.4 % — SIGNIFICANT CHANGE UP (ref 0–6)
GAS PNL BLDV: 136 MMOL/L — SIGNIFICANT CHANGE UP (ref 135–145)
GAS PNL BLDV: SIGNIFICANT CHANGE UP
GLUCOSE BLDV-MCNC: 104 MG/DL — HIGH (ref 70–99)
GLUCOSE SERPL-MCNC: 104 MG/DL — HIGH (ref 70–99)
HCO3 BLDV-SCNC: 24 MMOL/L — SIGNIFICANT CHANGE UP (ref 21–29)
HCT VFR BLD CALC: 34 % — LOW (ref 34.5–45)
HCT VFR BLDA CALC: 33 % — LOW (ref 39–50)
HGB BLD CALC-MCNC: 10.6 G/DL — LOW (ref 11.5–15.5)
HGB BLD-MCNC: 11.2 G/DL — LOW (ref 11.5–15.5)
IMM GRANULOCYTES NFR BLD AUTO: 1.1 % — SIGNIFICANT CHANGE UP (ref 0–1.5)
INR BLD: 0.89 RATIO — SIGNIFICANT CHANGE UP (ref 0.88–1.16)
LACTATE BLDV-MCNC: 1.6 MMOL/L — SIGNIFICANT CHANGE UP (ref 0.7–2)
LYMPHOCYTES # BLD AUTO: 1.55 K/UL — SIGNIFICANT CHANGE UP (ref 1–3.3)
LYMPHOCYTES # BLD AUTO: 13.7 % — SIGNIFICANT CHANGE UP (ref 13–44)
MCHC RBC-ENTMCNC: 30.9 PG — SIGNIFICANT CHANGE UP (ref 27–34)
MCHC RBC-ENTMCNC: 32.9 GM/DL — SIGNIFICANT CHANGE UP (ref 32–36)
MCV RBC AUTO: 93.7 FL — SIGNIFICANT CHANGE UP (ref 80–100)
MONOCYTES # BLD AUTO: 1.24 K/UL — HIGH (ref 0–0.9)
MONOCYTES NFR BLD AUTO: 11 % — SIGNIFICANT CHANGE UP (ref 2–14)
NEUTROPHILS # BLD AUTO: 8.11 K/UL — HIGH (ref 1.8–7.4)
NEUTROPHILS NFR BLD AUTO: 71.5 % — SIGNIFICANT CHANGE UP (ref 43–77)
NRBC # BLD: 0 /100 WBCS — SIGNIFICANT CHANGE UP (ref 0–0)
PCO2 BLDV: 36 MMHG — SIGNIFICANT CHANGE UP (ref 35–50)
PH BLDV: 7.44 — SIGNIFICANT CHANGE UP (ref 7.35–7.45)
PLATELET # BLD AUTO: 252 K/UL — SIGNIFICANT CHANGE UP (ref 150–400)
PO2 BLDV: 73 MMHG — HIGH (ref 25–45)
POTASSIUM BLDV-SCNC: 3.9 MMOL/L — SIGNIFICANT CHANGE UP (ref 3.5–5.3)
POTASSIUM SERPL-MCNC: 4 MMOL/L — SIGNIFICANT CHANGE UP (ref 3.5–5.3)
POTASSIUM SERPL-SCNC: 4 MMOL/L — SIGNIFICANT CHANGE UP (ref 3.5–5.3)
PROT SERPL-MCNC: 6.5 G/DL — SIGNIFICANT CHANGE UP (ref 6–8.3)
PROTHROM AB SERPL-ACNC: 10.7 SEC — SIGNIFICANT CHANGE UP (ref 10.6–13.6)
RBC # BLD: 3.63 M/UL — LOW (ref 3.8–5.2)
RBC # FLD: 14.4 % — SIGNIFICANT CHANGE UP (ref 10.3–14.5)
SAO2 % BLDV: 96 % — HIGH (ref 67–88)
SODIUM SERPL-SCNC: 138 MMOL/L — SIGNIFICANT CHANGE UP (ref 135–145)
WBC # BLD: 11.32 K/UL — HIGH (ref 3.8–10.5)
WBC # FLD AUTO: 11.32 K/UL — HIGH (ref 3.8–10.5)

## 2020-11-27 PROCEDURE — 93010 ELECTROCARDIOGRAM REPORT: CPT

## 2020-11-27 PROCEDURE — 99285 EMERGENCY DEPT VISIT HI MDM: CPT

## 2020-11-27 PROCEDURE — 71045 X-RAY EXAM CHEST 1 VIEW: CPT | Mod: 26

## 2020-11-27 PROCEDURE — 74177 CT ABD & PELVIS W/CONTRAST: CPT | Mod: 26

## 2020-11-27 NOTE — ED PROVIDER NOTE - ATTENDING CONTRIBUTION TO CARE
Chart entered by scribe on behalf of myself and resident, I have reviewed and edited/corrected as needed by myself.  Patient seen and evaluated with resident/NP/PA, however HPI, ROS, PE and MDM as documented entered by scribe and edited by myself unless otherwise noted- David Ramon MD

## 2020-11-27 NOTE — ED ADULT TRIAGE NOTE - CHIEF COMPLAINT QUOTE
Fibroids removed Monday, DC yesterday. +chills & fever today. Negative COVID yesterday. Referred to ED by surgeon to R/O post op infection

## 2020-11-27 NOTE — CONSULT NOTE ADULT - SUBJECTIVE AND OBJECTIVE BOX
Gyn Consult Note  THERESE WATSON  30y  Female [id number]    31y/o F now POD#4 s/p abdominal myomectomy on 11/23 with immediate postoperative course c/b anemia (s/p 4u PRBC transfusion) and stable hematoma identified on CTAP x2 consecutive days, presenting to [facility] ED with fevers at home.     Patient was discharged home 11/26 and states she initially felt well. She reports taking Tylenol/Iron/Vitamin C at approximately 3PM this afternoon. Shortly afterwards, she developed chills. She then attempted to nap (wearing long sleeved pajamas, a robe, and blankets) but awoke from her nap sweating. She took her temperature, which initially read 101, then fluctuated between 100.0-101 before downtrending to 98.9. She last took Ibuprofen at 6PM.     Patient denies abdominal pain, and states she has not required any tablets of Oxycodone for pain since discharge. She notes a slight persistent cough (productive of clear mucus) which is unchanged from her time in the hospital.  Denies LE edema or hemoptysis. She denies chest pain, shortness of breath, difficulty breathing. She denies palpitations.  She denies change to sense of taste or smell. She denies vaginal bleeding or abnormal discharge. Denies urinary symptoms.    She was discharged with PO Bactrim for possible cellulitis surrounding her incision, and notes that she was planning to take her first dose of Bactrim this evening at 9PM. She denies any change to her incision site.     Name of GYN Surgeon: Dr. Renee    GYNHx: + Uterine fibroids s/p myomectomy. Denies h/o cysts, endometriosis, STI's, abnormal pap smears   PMHx: Erythrocytosis monitored by hematologist   PSHx: Abdominal myomectomy, D&C  Meds: Tylenol/Motrin, Oxycodone PRN, Fe/VitC/Colace, Bactrim   Allergies: Ceclor (Rash)      Vital Signs Last 24 Hrs  T(C): 37.6 (27 Nov 2020 20:28), Max: 37.6 (27 Nov 2020 20:28)  T(F): 99.6 (27 Nov 2020 20:28), Max: 99.6 (27 Nov 2020 20:28)  HR: 112 (27 Nov 2020 20:28) (112 - 112)  BP: 141/81 (27 Nov 2020 20:28) (141/81 - 141/81)  RR: 18 (27 Nov 2020 20:28) (18 - 18)  SpO2: 97% (27 Nov 2020 20:28) (97% - 97%)    Physical Exam:   General: sitting comfortably in bed, NAD   CV: RRR  Lungs: CTAB  Abd: Soft, non-tender, non-distended.   Incision: Midline vertical incision, c/d/i with steri strips. Slight triston-incisional bruising. No erythema, no raised lesions. Demarcations of area of erythema outlined 2d ago visible.   : No bleeding on pad.  Ext: Non-tender b/l, no edema.     LABS:             9.7    9.13  )-----------( 198      ( 26 Nov 2020 17:17 )             27.9     11-26    142  |  107  |  5<L>  ----------------------------<  93  3.4<L>   |  24  |  0.59    Ca    8.4      26 Nov 2020 06:52      PT/INR - ( 26 Nov 2020 06:52 )   PT: 11.7 sec;   INR: 0.97 ratio    PTT - ( 26 Nov 2020 06:52 )  PTT:23.9 sec    RADIOLOGY & ADDITIONAL STUDIES:   - Repeat CTAP pending    Gyn Consult Note  THERESE WATSON  30y  Female [id number]    29y/o F now POD#4 s/p abdominal myomectomy on  with immediate postoperative course c/b anemia (s/p 4u PRBC transfusion) and stable hematoma identified on CTAP x2 consecutive days, presenting to [facility] ED with fevers at home.     Patient was discharged home  and states she initially felt well. She reports taking Tylenol/Iron/Vitamin C at approximately 3PM this afternoon. Shortly afterwards, she developed chills. She then attempted to nap (wearing long sleeved pajamas, a robe, and blankets) but awoke from her nap sweating. She took her temperature, which initially read 101, then fluctuated between 100.0-101 before downtrending to 98.9. She last took Ibuprofen at 6PM.     Patient denies abdominal pain, and states she has not required any tablets of Oxycodone for pain since discharge. She notes a slight persistent cough (productive of clear mucus) which is unchanged from her time in the hospital.  Denies LE edema or hemoptysis. She denies chest pain, shortness of breath, difficulty breathing. She denies palpitations.  She denies change to sense of taste or smell. She denies vaginal bleeding or abnormal discharge. Denies urinary symptoms.    She was discharged with PO Bactrim for possible cellulitis surrounding her incision, and notes that she was planning to take her first dose of Bactrim this evening at 9PM. She denies any change to her incision site.     Name of GYN Surgeon: Dr. Renee    GYNHx: + Uterine fibroids s/p myomectomy. Denies h/o cysts, endometriosis, STI's, abnormal pap smears   PMHx: Erythrocytosis monitored by hematologist   PSHx: Abdominal myomectomy, D&C  Meds: Tylenol/Motrin, Oxycodone PRN, Fe/VitC/Colace, Bactrim   Allergies: Ceclor (Rash)      Vital Signs Last 24 Hrs  T(C): 37.6 (2020 20:28), Max: 37.6 (2020 20:28)  T(F): 99.6 (2020 20:28), Max: 99.6 (2020 20:28)  HR: 112 (2020 20:28) (112 - 112)  BP: 141/81 (2020 20:28) (141/81 - 141/81)  RR: 18 (2020 20:28) (18 - 18)  SpO2: 97% (2020 20:28) (97% - 97%)    Physical Exam:   General: sitting comfortably in bed, NAD   CV: RRR  Lungs: CTAB  Abd: Soft, non-tender, non-distended.   Incision: Midline vertical incision, c/d/i with steri strips. Slight triston-incisional bruising. No erythema, no raised lesions. Demarcations of area of erythema outlined 2d ago visible.   : No bleeding on pad.  Ext: Non-tender b/l, no edema.     LABS:               11.2   11.32 )-----------( 252      (  @ 23:25 )             34.0                  9.7    9.13  )-----------( 198      ( 2020 17:17 )             27.9         142  |  107  |  5<L>  ----------------------------<  93  3.4<L>   |  24  |  0.59    Ca    8.4      2020 06:52    PT/INR - ( 2020 06:52 )   PT: 11.7 sec;   INR: 0.97 ratio    PTT - ( 2020 06:52 )  PTT:23.9 sec    Urinalysis Basic - ( 2020 23:52 )    Color: Yellow / Appearance: Clear / S.019 / pH: x  Gluc: x / Ketone: Trace  / Bili: Negative / Urobili: Negative   Blood: x / Protein: Negative / Nitrite: Negative   Leuk Esterase: Negative / RBC: x / WBC x   Sq Epi: x / Non Sq Epi: x / Bacteria: x      RADIOLOGY & ADDITIONAL STUDIES:  < from: CT Abdomen and Pelvis w/ IV Cont (20 @ 23:54) >  EXAM:  CT ABDOMEN AND PELVIS IC                          PROCEDURE DATE:  2020    INTERPRETATION:  CLINICAL INFORMATION: Abdominal pain and fever. Status post myomectomy. Evaluate for abscess. Surgical incision site infection/cellulitis, not started antibiotics.  COMPARISON: CT abdomen pelvis 2020.    PROCEDURE: CT of the Abdomen and Pelvis was performed with intravenous contrast.  Intravenous contrast: 90 ml Omnipaque 350. 10 ml discarded.  Oral contrast: None.  Sagittal and coronal reformats were performed.    FINDINGS:    LOWER CHEST: Trace bilateral effusions. Bilateral lower lobe and lingular subsegmental atelectasis.  LIVER: Within normal limits.  BILE DUCTS: Normal caliber.  GALLBLADDER: Within normal limits.  SPLEEN: Within normal limits.  PANCREAS: Within normal limits.    ADRENALS: Within normal limits.  KIDNEYS/URETERS: Within normal limits.  BLADDER: Underdistended, limiting evaluation.    REPRODUCTIVE ORGANS: Intrauterine device. Status post myomectomy with a few foci of intra-abdominal air, unchanged.  Large pelvic hematoma is redemonstrated. Surrounding fat stranding due to blood products, but superimposed infection cannot be excluded.    BOWEL: No bowel obstruction. Appendix is normal.  PERITONEUM: Small volume complex ascites reflecting small hemoperitoneum.  VESSELS: Within normal limits.  RETROPERITONEUM/LYMPH NODES: No lymphadenopathy. Small amount of free fluid in the right paracolic gutter.  ABDOMINAL WALL: Postsurgical changes.  BONES: Within normal limits.    IMPRESSION:  No significant change in large pelvic hematoma displacing the uterus left of midline. No new site of bleeding or progressive mass effect. Superinfected hematoma cannot be excluded given extensive surroundingfat stranding and blood products.      JIMBO QUINONEZ MD; Resident Radiology  This document has been electronically signed.  PATRICIA SOTO MD; Attending Radiologist  This document has been electronically signed. 2020  3:01AM    < end of copied text >

## 2020-11-27 NOTE — ED PROVIDER NOTE - NOTES
Patient hospital course complicated by post op hematoma, they are concerned for possible infected hematoma, will evaluate patient in Emergency Department, agree with plan for labs/cultures/COVID testing and CT A/P.

## 2020-11-27 NOTE — ED PROVIDER NOTE - CLINICAL SUMMARY MEDICAL DECISION MAKING FREE TEXT BOX
31yo F h.o fibroids s/p myomectomy presenting with post op fever. Post op stay complicated by cellulitis of incision site. PE unremarkable, will work up for infectious etiology of fever with imaging, blood cultures. OB consulted. will reassess after labs/imaging.

## 2020-11-27 NOTE — CONSULT NOTE ADULT - ASSESSMENT
31y/o F now POD#4 s/p abdominal myomectomy with known postoperative hematoma presenting to [facility] ED with postoperative fever. Patient tachycardic however with otherwise reassuring vital signs, physical exam and clinical status.  Differential diagnosis includes but not limited to infected hematoma, atelectasis/PNA as well as UTI:      - Please obtain: CTAP, CBC+Diff, BCx x2, UA, UCx, CXR    - GYN will continue to follow    d/w Dr. Frandy Burton PGY-2 29y/o F now POD#4 s/p abdominal myomectomy with known postoperative hematoma presenting to [facility] ED with postoperative fever. Patient tachycardic however with otherwise reassuring vital signs, physical exam and clinical status.  Differential diagnosis includes but not limited to infected hematoma, atelectasis/PNA as well as UTI:      - Please obtain: CTAP, CBC+Diff, BCx x2, UA, UCx, CXR, COVID-19 PCR   - GYN will continue to follow    d/w Dr. Frandy Burton PGY-2 29y/o F now POD#4 s/p abdominal myomectomy with known postoperative hematoma presenting to [facility] ED with postoperative fever. Patient tachycardic however with otherwise reassuring vital signs, physical exam and clinical status.  Differential diagnosis includes but not limited to infected hematoma, atelectasis/PNA as well as UTI:      - Please obtain: CTAP, CBC+Diff, BCx x2, UA, UCx, CXR, COVID-19 PCR   - GYN will continue to follow    d/w Dr. Frandy Burton PGY-2      ADDENDUM, 11/28 at 3:30AM: Patient is now s/p CTAP demonstrating stable hematoma, with inability to r/o infected hematoma given extensive fat stranding. Patient with increased WBC compared to prior. Since presentation to ED, patient has remained afebrile however tachycardic.   - Please obtain CTA with rundown to evaluate for possible PE +/- gonadal vein thrombosis in context of persistent tachycardia and low grade fevers postoperatively    - Will admit to GYN service for IV Vanc/Zosyn     d/w Dr. Frandy Mroan, PGY-2

## 2020-11-27 NOTE — ED PROVIDER NOTE - OBJECTIVE STATEMENT
30y F with PMHx of MICHELLE, Uterine Fibroids S/P myomectomy on 11/23/2020 presents to the ED c/o fever (TMAX: 101.3F) with chills today. Called surgeon today, told to come to ED to R/O postop infection. Productive cough starting a few days ago, no hemoptysis. Negative COVID 19 PCR yesterday. Was D/C'ed yesterday after 4 days of hospitalization, had 4u PRBC transfused for drop in Hgb. Taking iron supplements, Vit C, Tylenol, and Motrin 600mg QD - last dose at 1600 today. Denies CP, dysuria, hematuria, constipation, or N/V/D. 30y F with PMHx of MICHELLE, Uterine Fibroids S/P myomectomy with bilateral TAP blocks on 11/23/2020 presents to the ED c/o fever (TMAX: 101.3F) with chills today. Called surgeon today, told to come to ED to R/O postop infection. Productive cough starting a few days ago, no hemoptysis. Negative COVID 19 PCR yesterday. Was D/C'ed yesterday after 4 days of hospitalization, had 4u PRBC transfused for drop in Hgb. Taking iron supplements, Vit C, Tylenol, and Motrin 600mg QD - last dose at 1600 today. Denies CP, dysuria, hematuria, constipation, or N/V/D. 30y F with PMHx of MICHELLE, Uterine Fibroids S/P myomectomy on 11/23/2020 presents to the ED c/o fever (TMAX: 101.3F) with chills today. Called surgeon today, told to come to ED to R/O postop infection. Productive cough starting a few days ago, no hemoptysis. Negative COVID 19 PCR yesterday. Was D/C'ed yesterday after 4 days of hospitalization, had 4u PRBC transfused for drop in Hgb. Pt completed course of Bactrim for postoperative abx coverage. Taking iron supplements, Vit C, Tylenol, and Motrin 600mg QD - last dose at 1600 today. Denies CP, dysuria, hematuria, constipation, or N/V/D. 30y F with PMHx of MICHELLE, Uterine Fibroids S/P myomectomy on 11/23/2020 presents to the ED c/o fever (TMAX: 101.3F) with chills today. Called surgeon today, told to come to ED to R/O postop infection. Productive cough starting a few days ago, no hemoptysis. Negative COVID 19 PCR yesterday. Was D/C'ed yesterday after 4 days of hospitalization, had 4u PRBC transfused for drop in Hgb. Pt was started on Clindamycin on most recent admission for possible incisional cellulitis. Taking iron supplements, Vit C, Tylenol, and Motrin 600mg QD - last dose at 1600 today. Denies CP, dysuria, hematuria, constipation, or N/V/D. 30y F with PMHx of MICHELLE, Uterine Fibroids S/P myomectomy on 11/23/2020 presents to the ED c/o fever (TMAX: 101.3F) with chills today. Called surgeon today, told to come to ED to R/O postop infection. Productive cough starting a few days ago, no hemoptysis. Negative COVID 19 PCR while admitted. Was D/C'ed yesterday after 4 days of hospitalization, had 4u PRBC transfused for drop in Hgb. Pt was started on Clindamycin on most recent admission for possible incisional cellulitis, now on Bactrim PO at home which she has been compliant with. Taking iron supplements, Vit C, Tylenol, and Motrin 600mg QD - last dose at 1600 today. Denies CP, cough, shortness of breath, dysuria, hematuria, constipation, or N/V/D.

## 2020-11-27 NOTE — ED ADULT NURSE NOTE - OBJECTIVE STATEMENT
30F to ED c/o fever up to 101.3F and chills s/p fibroid removal on Monday. Patient was d/c from the hospital yesterday and also tested negative for covid19 yesterday. Patient c/o cough. Patient denies pain at this, denies dysuria, SOB, chest pain. Patient states that she had 4 blood transfusions while she was admitted. Patient states she last took tylenol at 3PM but has taken ibuprofen since then. Patient's surgical site appears clean, intact. No swelling or reddness noted at this time.  Patient is alert and oriented x4, calm/cooperative, NAD, VSS. Patient is given the call bell and verbalizes understanding of its use.

## 2020-11-27 NOTE — ED PROVIDER NOTE - PROGRESS NOTE DETAILS
Nish Bowesr, PGY-1: Pt reexamined at bedside, pain previously controlled with 4mg morphine has since recurred- will reassess. Attending Douglas:  Discussed w/ ob specifically regarding CTA for PE. Given pt had no resp complaints, no sob, no hemoptysis, and recent CT that ruled out pe 11/25, felt risks of increased radiation outweighed the benefits. Nish Bowers, PGY-1: CT abd sig for fat stranding cannot exclude infectious hematoma, will admit pt for IV antibiotics, will start zosyn and vanc. Admitting team strongly recommending CTA chest to assess for PE given tachycardia despite recent negative CTA. Will repeat CTA chest at request of admitting team. pain control will be assessed.

## 2020-11-27 NOTE — ED PROVIDER NOTE - PHYSICAL EXAMINATION
Nish Bowers (MD):   GEN: NAD, awake, eyes open spontaneously  EYES: normal conjunctiva, perrl  ENT: NCAT, MMM, Trachea midline  CHEST/LUNGS: Non-tachypneic, CTAB, bilateral breath sounds  CARDIAC: Non-tachycardic, normal perfusion  ABDOMEN:   MSK: No edema, no gross deformity of extremities  SKIN: No rashes, no petechiae, no vesicles Nish Bowers (MD):   GEN: NAD, awake, eyes open spontaneously  EYES: normal conjunctiva, perrl  ENT: NCAT, MMM, Trachea midline  CHEST/LUNGS: Non-tachypneic, CTAB, bilateral breath sounds  CARDIAC: Tachycardic, normal perfusion  ABDOMEN:   MSK: No edema, no gross deformity of extremities  SKIN: No rashes, no petechiae, no vesicles Nish Bowers (MD):   GEN: NAD, awake, eyes open spontaneously  EYES: normal conjunctiva, perrl  ENT: NCAT, MMM, Trachea midline  CHEST/LUNGS: Tachypnic, no distress or increased WOB, CTAB, bilateral breath sounds  CARDIAC: Tachycardic, normal perfusion  ABDOMEN: soft, non-tender, incision line clean/dry/intact with overlying steristrips, marker demarcation from previous cellulitis, currently no erythema or drainage  MSK: No edema, no gross deformity of extremities  SKIN: No rashes, no petechiae, no vesicles Nish Bowers (MD):   GEN: NAD, awake, eyes open spontaneously  EYES: normal conjunctiva, perrl  ENT: NCAT, MMM, Trachea midline  CHEST/LUNGS: CTABL, no distress or increased WOB  CARDIAC: Tachycardic, normal perfusion  ABDOMEN: soft, minimal tenderness consistent with post operative state, incision line clean/dry/intact with overlying steristrips, marker demarcation from previous cellulitis however minimal erythema surrounding incision, currently no erythema or drainage  MSK: No edema, no gross deformity of extremities  SKIN: No rashes, no petechiae, no vesicles    Above exam edited to accurately describe my findings on exam.  David Ramon M.D.

## 2020-11-28 DIAGNOSIS — R50.9 FEVER, UNSPECIFIED: ICD-10-CM

## 2020-11-28 LAB
APPEARANCE UR: CLEAR — SIGNIFICANT CHANGE UP
APTT BLD: 28.9 SEC — SIGNIFICANT CHANGE UP (ref 27.5–35.5)
BASOPHILS # BLD AUTO: 0.02 K/UL — SIGNIFICANT CHANGE UP (ref 0–0.2)
BASOPHILS NFR BLD AUTO: 0.2 % — SIGNIFICANT CHANGE UP (ref 0–2)
BILIRUB UR-MCNC: NEGATIVE — SIGNIFICANT CHANGE UP
COLOR SPEC: YELLOW — SIGNIFICANT CHANGE UP
DIFF PNL FLD: NEGATIVE — SIGNIFICANT CHANGE UP
EOSINOPHIL # BLD AUTO: 0.03 K/UL — SIGNIFICANT CHANGE UP (ref 0–0.5)
EOSINOPHIL NFR BLD AUTO: 0.2 % — SIGNIFICANT CHANGE UP (ref 0–6)
FIBRINOGEN PPP-MCNC: 934 MG/DL — HIGH (ref 290–520)
GLUCOSE UR QL: NEGATIVE — SIGNIFICANT CHANGE UP
HCT VFR BLD CALC: 30.8 % — LOW (ref 34.5–45)
HGB BLD-MCNC: 10.4 G/DL — LOW (ref 11.5–15.5)
IMM GRANULOCYTES NFR BLD AUTO: 0.6 % — SIGNIFICANT CHANGE UP (ref 0–1.5)
INR BLD: 1.02 RATIO — SIGNIFICANT CHANGE UP (ref 0.88–1.16)
KETONES UR-MCNC: SIGNIFICANT CHANGE UP
LEUKOCYTE ESTERASE UR-ACNC: NEGATIVE — SIGNIFICANT CHANGE UP
LYMPHOCYTES # BLD AUTO: 1.1 K/UL — SIGNIFICANT CHANGE UP (ref 1–3.3)
LYMPHOCYTES # BLD AUTO: 8.7 % — LOW (ref 13–44)
MCHC RBC-ENTMCNC: 30.5 PG — SIGNIFICANT CHANGE UP (ref 27–34)
MCHC RBC-ENTMCNC: 33.8 GM/DL — SIGNIFICANT CHANGE UP (ref 32–36)
MCV RBC AUTO: 90.3 FL — SIGNIFICANT CHANGE UP (ref 80–100)
MONOCYTES # BLD AUTO: 1.44 K/UL — HIGH (ref 0–0.9)
MONOCYTES NFR BLD AUTO: 11.3 % — SIGNIFICANT CHANGE UP (ref 2–14)
NEUTROPHILS # BLD AUTO: 10.04 K/UL — HIGH (ref 1.8–7.4)
NEUTROPHILS NFR BLD AUTO: 79 % — HIGH (ref 43–77)
NITRITE UR-MCNC: NEGATIVE — SIGNIFICANT CHANGE UP
NRBC # BLD: 0 /100 WBCS — SIGNIFICANT CHANGE UP (ref 0–0)
PH UR: 7.5 — SIGNIFICANT CHANGE UP (ref 5–8)
PLATELET # BLD AUTO: 252 K/UL — SIGNIFICANT CHANGE UP (ref 150–400)
PROT UR-MCNC: NEGATIVE — SIGNIFICANT CHANGE UP
PROTHROM AB SERPL-ACNC: 12.1 SEC — SIGNIFICANT CHANGE UP (ref 10.6–13.6)
RBC # BLD: 3.41 M/UL — LOW (ref 3.8–5.2)
RBC # FLD: 14.3 % — SIGNIFICANT CHANGE UP (ref 10.3–14.5)
SARS-COV-2 IGG SERPL QL IA: NEGATIVE — SIGNIFICANT CHANGE UP
SARS-COV-2 IGM SERPL IA-ACNC: <0.1 INDEX — SIGNIFICANT CHANGE UP
SARS-COV-2 RNA SPEC QL NAA+PROBE: SIGNIFICANT CHANGE UP
SP GR SPEC: 1.02 — SIGNIFICANT CHANGE UP (ref 1.01–1.02)
UROBILINOGEN FLD QL: NEGATIVE — SIGNIFICANT CHANGE UP
WBC # BLD: 12.71 K/UL — HIGH (ref 3.8–10.5)
WBC # FLD AUTO: 12.71 K/UL — HIGH (ref 3.8–10.5)

## 2020-11-28 PROCEDURE — 93970 EXTREMITY STUDY: CPT | Mod: 26

## 2020-11-28 RX ORDER — ACETAMINOPHEN 500 MG
975 TABLET ORAL EVERY 6 HOURS
Refills: 0 | Status: DISCONTINUED | OUTPATIENT
Start: 2020-11-28 | End: 2020-11-29

## 2020-11-28 RX ORDER — VANCOMYCIN HCL 1 G
1000 VIAL (EA) INTRAVENOUS ONCE
Refills: 0 | Status: COMPLETED | OUTPATIENT
Start: 2020-11-28 | End: 2020-11-29

## 2020-11-28 RX ORDER — CIPROFLOXACIN LACTATE 400MG/40ML
400 VIAL (ML) INTRAVENOUS EVERY 12 HOURS
Refills: 0 | Status: DISCONTINUED | OUTPATIENT
Start: 2020-11-28 | End: 2020-11-28

## 2020-11-28 RX ORDER — METRONIDAZOLE 500 MG
500 TABLET ORAL ONCE
Refills: 0 | Status: COMPLETED | OUTPATIENT
Start: 2020-11-28 | End: 2020-11-28

## 2020-11-28 RX ORDER — HEPARIN SODIUM 5000 [USP'U]/ML
5000 INJECTION INTRAVENOUS; SUBCUTANEOUS EVERY 12 HOURS
Refills: 0 | Status: DISCONTINUED | OUTPATIENT
Start: 2020-11-28 | End: 2020-12-04

## 2020-11-28 RX ORDER — VANCOMYCIN HCL 1 G
VIAL (EA) INTRAVENOUS
Refills: 0 | Status: DISCONTINUED | OUTPATIENT
Start: 2020-11-29 | End: 2020-12-01

## 2020-11-28 RX ORDER — MORPHINE SULFATE 50 MG/1
4 CAPSULE, EXTENDED RELEASE ORAL ONCE
Refills: 0 | Status: DISCONTINUED | OUTPATIENT
Start: 2020-11-28 | End: 2020-11-28

## 2020-11-28 RX ORDER — INFLUENZA VIRUS VACCINE 15; 15; 15; 15 UG/.5ML; UG/.5ML; UG/.5ML; UG/.5ML
0.5 SUSPENSION INTRAMUSCULAR ONCE
Refills: 0 | Status: DISCONTINUED | OUTPATIENT
Start: 2020-11-28 | End: 2020-12-04

## 2020-11-28 RX ORDER — PIPERACILLIN AND TAZOBACTAM 4; .5 G/20ML; G/20ML
3.38 INJECTION, POWDER, LYOPHILIZED, FOR SOLUTION INTRAVENOUS EVERY 8 HOURS
Refills: 0 | Status: DISCONTINUED | OUTPATIENT
Start: 2020-11-28 | End: 2020-12-03

## 2020-11-28 RX ORDER — CIPROFLOXACIN LACTATE 400MG/40ML
VIAL (ML) INTRAVENOUS
Refills: 0 | Status: DISCONTINUED | OUTPATIENT
Start: 2020-11-28 | End: 2020-11-28

## 2020-11-28 RX ORDER — CIPROFLOXACIN LACTATE 400MG/40ML
400 VIAL (ML) INTRAVENOUS ONCE
Refills: 0 | Status: DISCONTINUED | OUTPATIENT
Start: 2020-11-28 | End: 2020-11-28

## 2020-11-28 RX ORDER — SENNA PLUS 8.6 MG/1
2 TABLET ORAL AT BEDTIME
Refills: 0 | Status: DISCONTINUED | OUTPATIENT
Start: 2020-11-28 | End: 2020-12-02

## 2020-11-28 RX ORDER — SODIUM CHLORIDE 9 MG/ML
500 INJECTION, SOLUTION INTRAVENOUS
Refills: 0 | Status: DISCONTINUED | OUTPATIENT
Start: 2020-11-28 | End: 2020-12-01

## 2020-11-28 RX ORDER — SIMETHICONE 80 MG/1
80 TABLET, CHEWABLE ORAL EVERY 6 HOURS
Refills: 0 | Status: DISCONTINUED | OUTPATIENT
Start: 2020-11-28 | End: 2020-12-04

## 2020-11-28 RX ORDER — FERROUS SULFATE 325(65) MG
325 TABLET ORAL DAILY
Refills: 0 | Status: DISCONTINUED | OUTPATIENT
Start: 2020-11-28 | End: 2020-12-04

## 2020-11-28 RX ORDER — ACETAMINOPHEN 500 MG
975 TABLET ORAL ONCE
Refills: 0 | Status: COMPLETED | OUTPATIENT
Start: 2020-11-28 | End: 2020-11-28

## 2020-11-28 RX ORDER — PIPERACILLIN AND TAZOBACTAM 4; .5 G/20ML; G/20ML
3.38 INJECTION, POWDER, LYOPHILIZED, FOR SOLUTION INTRAVENOUS ONCE
Refills: 0 | Status: COMPLETED | OUTPATIENT
Start: 2020-11-28 | End: 2020-11-28

## 2020-11-28 RX ORDER — VANCOMYCIN HCL 1 G
1000 VIAL (EA) INTRAVENOUS ONCE
Refills: 0 | Status: COMPLETED | OUTPATIENT
Start: 2020-11-28 | End: 2020-11-28

## 2020-11-28 RX ORDER — OXYCODONE HYDROCHLORIDE 5 MG/1
5 TABLET ORAL ONCE
Refills: 0 | Status: DISCONTINUED | OUTPATIENT
Start: 2020-11-28 | End: 2020-11-28

## 2020-11-28 RX ORDER — IBUPROFEN 200 MG
600 TABLET ORAL EVERY 6 HOURS
Refills: 0 | Status: DISCONTINUED | OUTPATIENT
Start: 2020-11-28 | End: 2020-12-04

## 2020-11-28 RX ORDER — ASCORBIC ACID 60 MG
500 TABLET,CHEWABLE ORAL DAILY
Refills: 0 | Status: DISCONTINUED | OUTPATIENT
Start: 2020-11-28 | End: 2020-12-04

## 2020-11-28 RX ORDER — VANCOMYCIN HCL 1 G
1000 VIAL (EA) INTRAVENOUS EVERY 12 HOURS
Refills: 0 | Status: DISCONTINUED | OUTPATIENT
Start: 2020-11-29 | End: 2020-12-01

## 2020-11-28 RX ADMIN — Medication 500 MILLIGRAM(S): at 12:32

## 2020-11-28 RX ADMIN — PIPERACILLIN AND TAZOBACTAM 200 GRAM(S): 4; .5 INJECTION, POWDER, LYOPHILIZED, FOR SOLUTION INTRAVENOUS at 07:16

## 2020-11-28 RX ADMIN — MORPHINE SULFATE 4 MILLIGRAM(S): 50 CAPSULE, EXTENDED RELEASE ORAL at 03:55

## 2020-11-28 RX ADMIN — PIPERACILLIN AND TAZOBACTAM 25 GRAM(S): 4; .5 INJECTION, POWDER, LYOPHILIZED, FOR SOLUTION INTRAVENOUS at 22:33

## 2020-11-28 RX ADMIN — Medication 975 MILLIGRAM(S): at 09:33

## 2020-11-28 RX ADMIN — PIPERACILLIN AND TAZOBACTAM 25 GRAM(S): 4; .5 INJECTION, POWDER, LYOPHILIZED, FOR SOLUTION INTRAVENOUS at 14:57

## 2020-11-28 RX ADMIN — SENNA PLUS 2 TABLET(S): 8.6 TABLET ORAL at 23:05

## 2020-11-28 RX ADMIN — Medication 975 MILLIGRAM(S): at 10:05

## 2020-11-28 RX ADMIN — HEPARIN SODIUM 5000 UNIT(S): 5000 INJECTION INTRAVENOUS; SUBCUTANEOUS at 11:04

## 2020-11-28 RX ADMIN — Medication 975 MILLIGRAM(S): at 23:00

## 2020-11-28 RX ADMIN — OXYCODONE HYDROCHLORIDE 5 MILLIGRAM(S): 5 TABLET ORAL at 11:55

## 2020-11-28 RX ADMIN — Medication 600 MILLIGRAM(S): at 07:40

## 2020-11-28 RX ADMIN — Medication 600 MILLIGRAM(S): at 08:10

## 2020-11-28 RX ADMIN — Medication 975 MILLIGRAM(S): at 22:34

## 2020-11-28 RX ADMIN — HEPARIN SODIUM 5000 UNIT(S): 5000 INJECTION INTRAVENOUS; SUBCUTANEOUS at 22:33

## 2020-11-28 RX ADMIN — SIMETHICONE 80 MILLIGRAM(S): 80 TABLET, CHEWABLE ORAL at 18:20

## 2020-11-28 RX ADMIN — MORPHINE SULFATE 4 MILLIGRAM(S): 50 CAPSULE, EXTENDED RELEASE ORAL at 03:26

## 2020-11-28 RX ADMIN — Medication 100 MILLIGRAM(S): at 09:43

## 2020-11-28 RX ADMIN — Medication 250 MILLIGRAM(S): at 02:23

## 2020-11-28 RX ADMIN — OXYCODONE HYDROCHLORIDE 5 MILLIGRAM(S): 5 TABLET ORAL at 11:21

## 2020-11-28 RX ADMIN — SIMETHICONE 80 MILLIGRAM(S): 80 TABLET, CHEWABLE ORAL at 12:32

## 2020-11-28 RX ADMIN — MORPHINE SULFATE 4 MILLIGRAM(S): 50 CAPSULE, EXTENDED RELEASE ORAL at 00:13

## 2020-11-28 RX ADMIN — Medication 325 MILLIGRAM(S): at 12:32

## 2020-11-28 RX ADMIN — Medication 975 MILLIGRAM(S): at 02:22

## 2020-11-28 NOTE — PROGRESS NOTE ADULT - SUBJECTIVE AND OBJECTIVE BOX
Gyn Progress Note POD #5    Subjective:     Pt readmitted POD 5 status post abd myomectomy on c/w pelvic hematoma and blood transfusions. States that she has been having fevers at home, had a temp of 38.0 in ED. Patient right now says she has been having chills. Denies CP, SOB, N, V. Tolerating PO and having regular bowel movements at home.   Objective:  T(F): 98.9 (20 @ 06:00), Max: 100.4 (20 @ 02:15)  HR: 88 (20 @ 06:00) (88 - 119)  BP: 132/82 (20 @ 06:00) (126/77 - 146/75)  RR: 16 (20 @ 06:00) (16 - 18)  SpO2: 97% (20 @ 06:00) (96% - 99%)  Wt(kg): --  I&O's Summary    CAPILLARY BLOOD GLUCOSE          MEDICATIONS  (STANDING):  ascorbic acid 500 milliGRAM(s) Oral daily  ferrous    sulfate 325 milliGRAM(s) Oral daily  heparin   Injectable 5000 Unit(s) SubCutaneous every 12 hours  influenza   Vaccine 0.5 milliLiter(s) IntraMuscular once  lactated ringers. 500 milliLiter(s) (125 mL/Hr) IV Continuous <Continuous>  metroNIDAZOLE  IVPB 500 milliGRAM(s) IV Intermittent once  piperacillin/tazobactam IVPB. 3.375 Gram(s) IV Intermittent once  piperacillin/tazobactam IVPB.. 3.375 Gram(s) IV Intermittent every 8 hours  senna 2 Tablet(s) Oral at bedtime  simethicone 80 milliGRAM(s) Chew every 6 hours    MEDICATIONS  (PRN):  acetaminophen   Tablet .. 975 milliGRAM(s) Oral every 6 hours PRN Temp greater or equal to 38C (100.4F), Mild Pain (1 - 3)  ibuprofen  Tablet. 600 milliGRAM(s) Oral every 6 hours PRN Moderate Pain (4 - 6)      Physical Exam:  Constitutional: NAD, A+O x3  CV: RRR  Lungs: clear to auscultation bilaterally  Abdomen: soft,[] bowel sounds, appropriately tender, softly distended, no rebound or guarding  Incision: clean, dry, intact  Extremities: no lower extremity edema or calf tenderness bilaterally, venodynes in place    Labs:                        11.2   11.32 )-----------( 252      ( 2020 23:25 )             34.0                         9.7    9.13  )-----------( 198      ( 2020 17:17 )             27.9     11    138    |  104    |  6<L>   ----------------------------<  104<H>  4.0     |  22     |  0.60     Ca    9.6        2020 23:25    TPro  6.5    /  Alb  3.7    /  TBili  0.6    /  DBili  x      /  AST  103<H>  /  ALT  88<H>  /  AlkPhos  135<H>          PT/INR - ( 2020 23:25 )   PT: 10.7 sec;   INR: 0.89 ratio         PTT - ( 2020 23:25 )  PTT:27.8 sec  Urinalysis Basic - ( 2020 23:52 )    Color: Yellow / Appearance: Clear / S.019 / pH: x  Gluc: x / Ketone: Trace  / Bili: Negative / Urobili: Negative   Blood: x / Protein: Negative / Nitrite: Negative   Leuk Esterase: Negative / RBC: x / WBC x   Sq Epi: x / Non Sq Epi: x / Bacteria: x      PE:  CV: S1S2 tachycardic  Resp: CTAbilat  Abd: soft,  non tender, non distended  inc: vertical c/d/i, no cellulitis seen  Ext: NTBL, SCD on and functioning

## 2020-11-28 NOTE — PROGRESS NOTE ADULT - ATTENDING COMMENTS
Pt seen at bedside, in agreement with plan of care, will await cultures, if persistant temperature spikes will consult ID. Cont abx, daily CBC with diff, cont HSQ, encouraged incentive spirometry and ambulation

## 2020-11-28 NOTE — CONSULT NOTE ADULT - ASSESSMENT
Assessment/Plan:   30y Female with post perative fever status post myomectomy. CT of the abdomen and pelvis reports 12cm hematoma however this area is similar (although decreased) in appearance to preprocedure imaging, question residual uterine tissue after myomectomy. Additional areas of hyperdense fluid consistent with blood products.   - sampling of the fluid can be performed to confirm causative organism if there is concern for infection however fever can also be attributed to blood products alone. Sampling of fluid would carry risk of introducing infection into a sterile collection.   - MRI can be considered to better delineate hematoma vs residual uterine tissue  - would not recommend aspiration or drainage at this time as patient remains hemodynamically stable.   - can reconsider procedure if patient's clinical status changes  - recommendations discussed with primary team.     Please page IR with any questions or concerns.  After hours and weekends please page [phone] followed by [phone]. Long range call back number appreciated.

## 2020-11-28 NOTE — CONSULT NOTE ADULT - SUBJECTIVE AND OBJECTIVE BOX
Vascular & Interventional Radiology Brief Consult Note    Evaluate for Procedure: Drainage of intraabdominal collection/hematoma.     HPI: 30y Female status post myomectomy POD5 presents with fever and chills. Found to have worsening anemia and imaging concerning for hematoma.     Allergies: Ceclor (Rash)    Medications (Abx/Cardiac/Anticoagulation/Blood Products)    heparin   Injectable: 5000 Unit(s) SubCutaneous (11-28 @ 11:04)  metroNIDAZOLE  IVPB: 100 mL/Hr IV Intermittent (11-28 @ 09:43)  piperacillin/tazobactam IVPB.: 200 mL/Hr IV Intermittent (11-28 @ 07:16)  piperacillin/tazobactam IVPB..: 25 mL/Hr IV Intermittent (11-28 @ 14:57)  vancomycin  IVPB.: 250 mL/Hr IV Intermittent (11-28 @ 02:23)    Data:  165.1  100  T(C): 37.1  HR: 103  BP: 117/80  RR: 18  SpO2: 97%    -WBC 12.71 / HgB 10.4 / Hct 30.8 / Plt 252  -Na 138 / Cl 104 / BUN 6 / Glucose 104  -K 4.0 / CO2 22 / Cr 0.60  -ALT 88 / Alk Phos 135 / T.Bili 0.6  -INR1.02    Imaging:   CT abd/pelvis 11/27/2020: Enhancing uterine tissue with IUD displaced to the left alex-abdomen/pelvis secondary to soft tissue density. This is similar in appearance to MRI of 2/6/2020 although soft tissue density is decreased. Additional hyperdense fluid in the right hemiabdomen consistent with blood products. Small foci of air within the soft tissue density that are consistent with post operative state. This soft tissue density is stable from CT of 11/25, question residual uterine tissue after myomectomy.

## 2020-11-28 NOTE — H&P ADULT - NSHPPHYSICALEXAM_GEN_ALL_CORE
VS  T(C): 38 (11-28-20 @ 02:15)  HR: 119 (11-28-20 @ 02:15)  BP: 126/77 (11-28-20 @ 02:15)  RR: 18 (11-28-20 @ 02:15)  SpO2: 96% (11-28-20 @ 02:15)    EXAM  General: sitting comfortably in bed, NAD   CV: RRR  Lungs: CTAB  Abd: Soft, non-tender, non-distended.   Incision: Midline vertical incision, c/d/i with steri strips. Slight triston-incisional bruising. No erythema, no raised lesions. Demarcations of area of erythema outlined 2d ago visible.   : No bleeding on pad.  Ext: Non-tender b/l, no edema.

## 2020-11-28 NOTE — PROGRESS NOTE ADULT - PROBLEM SELECTOR PLAN 1
BLD, UCX sent, daily CBC with diff, will discuss with IR possible drainage, unlikely due to ?infected hematoma, to continue to monitor VS, fluid intake, HSQ    tachycardia: will get lower ext dopplers, CTA neg for PE on prior admission, if persists will discuss repeat CTA for PE.

## 2020-11-28 NOTE — H&P ADULT - ATTENDING COMMENTS
Agree with above assessment, status post myomectomy with post operative course c/w hematoma and multiple transfusions. Patient called service with fevers at home of 101-101.6, Tmax here 38.0 approx 2am. Patient with persistent tachycardia, CT here showing stable hematoma/ cannot rule out  infection. Bld/Ucx pending, UA negative. CXR showing atelectasis. will continue to monitor, for lower extremity dopplers today, CTA from earlier admission read by radiology state bc habitus suboptimal study, efren get lower extremity dopplers to r/o DVT, if persistant tachycardia will re- scan for PE vs gonadal vein thrombosis. Zosyn started, daily CBC with diff. for IR consult in AM regarding, hematoma, ?drainage. HSQ. SCD, ambulation and incentive spirometry.

## 2020-11-28 NOTE — H&P ADULT - HISTORY OF PRESENT ILLNESS
THERESE WATSON  30y  Female [id number]    29y/o F now POD#4 s/p abdominal myomectomy on 11/23 with immediate postoperative course c/b anemia (s/p 4u PRBC transfusion) and stable hematoma identified on CTAP x2 consecutive days, presenting to [facility] ED with fevers at home.     Patient was discharged home 11/26 and states she initially felt well. She reports taking Tylenol/Iron/Vitamin C at approximately 3PM this afternoon. Shortly afterwards, she developed chills. She then attempted to nap (wearing long sleeved pajamas, a robe, and blankets) but awoke from her nap sweating. She took her temperature, which initially read 101, then fluctuated between 100.0-101 before downtrending to 98.9. She last took Ibuprofen at 6PM.     Patient denies abdominal pain, and states she has not required any tablets of Oxycodone for pain since discharge. She notes a slight persistent cough (productive of clear mucus) which is unchanged from her time in the hospital.  Denies LE edema or hemoptysis. She denies chest pain, shortness of breath, difficulty breathing. She denies palpitations.  She denies change to sense of taste or smell. She denies vaginal bleeding or abnormal discharge. Denies urinary symptoms.    She was discharged with PO Bactrim for possible cellulitis surrounding her incision, and notes that she was planning to take her first dose of Bactrim this evening at 9PM. She denies any change to her incision site.     Name of GYN Surgeon: Dr. Renee    GYNHx: + Uterine fibroids s/p myomectomy. Denies h/o cysts, endometriosis, STI's, abnormal pap smears   PMHx: Erythrocytosis monitored by hematologist   PSHx: Abdominal myomectomy, D&C  Meds: Tylenol/Motrin, Oxycodone PRN, Fe/VitC/Colace, Bactrim   Allergies: Ceclor (Rash)

## 2020-11-28 NOTE — H&P ADULT - PROBLEM SELECTOR PLAN 1
- Neuro: Ibuprofen/Acetaminophen PRN for pain    - CV: Hemodynamically stable. AM CBC.    - Pulm: Maintaining appropriate oxygen saturations on room air. Encourage incentive spirometry. Will f/u results of CTA Chest to rule out pulmonary embolus.    - GI: NPO.    - : Voiding spontaneously, no active issues.    - Heme: SCDs for DVT prophylaxis. AM Coags ordered. Will initiate prophylactic HSQ pending discussion surrounding drainage of hematoma with IR.    - FEN: LR@125cc/hr while NPO   - ID: Patient initiated on IV Vancomycin and Zosyn for concern of possible superinfected hematoma. Will trend WBC on AM CBC. Will consult IR for possible drainage of hematoma in the morning.     Dispo: Admit to GYN service for intravenous antibiotics. WIll follow-up results of CTA chest with rundown. IR consult for potential drainage of superinfected hematoma.     d/w Dr. Frandy Moran, PGY-2

## 2020-11-28 NOTE — CHART NOTE - NSCHARTNOTEFT_GEN_A_CORE
R2 GYN Note    S: Called by nurse that patient had another Temp to 100.6 with persistent back pain and chest discomfort 2/2 tachycardia. Patient reports she has been feeling intermittently hot and cold all day. Back pain has been relieved with Tylenol, motrin and oxy. She is ambulating, tolerating PO, voiding spontaneously, and passing flatus.     O:  Vital Signs Last 24 Hrs  T(C): 38.1 (28 Nov 2020 22:39), Max: 38.1 (28 Nov 2020 22:39)  T(F): 100.6 (28 Nov 2020 22:39), Max: 100.6 (28 Nov 2020 22:39)  HR: 120 (28 Nov 2020 22:39) (88 - 120)  BP: 121/81 (28 Nov 2020 20:44) (116/77 - 140/84)  BP(mean): 89 (28 Nov 2020 02:15) (89 - 89)  RR: 18 (28 Nov 2020 20:44) (16 - 18)  SpO2: 98% (28 Nov 2020 20:44) (96% - 98%)    Gen: in NAD lying in bed  CV: tachycardic to 120, regular rhythm  Chest: CTAB  Abd: midline vertical incision c/d/i with steri strips in place; +BS, nondistended, nontender  Ext: warm, well perfused, no edema    A/P 31yo POD5 s/p abdominal myomectomy w/ known post op hematoma diagnosed prior to discharge, readmitted yesterday with postop fever concerning for infected hematoma. Patient started on zosyn for BSAbx, however continues to have fevers and tachycardia.   -AM CBC w/ Diff; continue with daily AM CBCs w/ diff  -Consider ID consult in AM to narrow abx  -Follow up Bcx, Ucx  -Continue Zosyn  -Continue IVF  -Continue Motrin, Tylenol, Oxy for pain and/or fevers  -Will continue to monitor VS closely     D/w Dr. Patel RFrankel PGY2 R2 GYN Note    S: Called by nurse that patient had another Temp to 100.6 with persistent back pain and chest discomfort 2/2 tachycardia. Patient reports she has been feeling intermittently hot and cold all day. Back pain has been relieved with Tylenol, motrin and oxy. She denies nausea, vomiting, diarrhea, dysuria. She is ambulating, tolerating PO, voiding spontaneously, and passing flatus.     O:  Vital Signs Last 24 Hrs  T(C): 38.1 (28 Nov 2020 22:39), Max: 38.1 (28 Nov 2020 22:39)  T(F): 100.6 (28 Nov 2020 22:39), Max: 100.6 (28 Nov 2020 22:39)  HR: 120 (28 Nov 2020 22:39) (88 - 120)  BP: 121/81 (28 Nov 2020 20:44) (116/77 - 140/84)  BP(mean): 89 (28 Nov 2020 02:15) (89 - 89)  RR: 18 (28 Nov 2020 20:44) (16 - 18)  SpO2: 98% (28 Nov 2020 20:44) (96% - 98%)    Gen: in NAD lying in bed  CV: tachycardic to 120, regular rhythm  Chest: CTAB  Abd: midline vertical incision c/d/i with steri strips in place; +BS, nondistended, nontender  Ext: warm, well perfused, no edema    A/P 31yo POD5 s/p abdominal myomectomy w/ known post op hematoma diagnosed prior to discharge, readmitted yesterday with postop fever concerning for infected hematoma. Patient started on zosyn for BSAbx, however continues to have fevers and tachycardia.   -AM CBC w/ Diff; continue with daily AM CBCs w/ diff  -Consider ID consult in AM to narrow abx  -Follow up Bcx, Ucx  -Continue Zosyn  -Continue IVF  -Continue Motrin, Tylenol, Oxy for pain and/or fevers  -Will continue to monitor VS closely     D/w Dr. Patel RFrankel PGY2 R2 GYN Note    S: Called by nurse that patient had another Temp to 100.6 with persistent back pain and chest discomfort 2/2 tachycardia. Patient reports she has been feeling intermittently hot and cold all day. Back pain has been relieved with Tylenol, motrin and oxy. She denies nausea, vomiting, diarrhea, dysuria. She is ambulating, tolerating PO, voiding spontaneously, and passing flatus.     O:  Vital Signs Last 24 Hrs  T(C): 38.1 (28 Nov 2020 22:39), Max: 38.1 (28 Nov 2020 22:39)  T(F): 100.6 (28 Nov 2020 22:39), Max: 100.6 (28 Nov 2020 22:39)  HR: 120 (28 Nov 2020 22:39) (88 - 120)  BP: 121/81 (28 Nov 2020 20:44) (116/77 - 140/84)  BP(mean): 89 (28 Nov 2020 02:15) (89 - 89)  RR: 18 (28 Nov 2020 20:44) (16 - 18)  SpO2: 98% (28 Nov 2020 20:44) (96% - 98%)    Gen: in NAD lying in bed  CV: tachycardic to 120, regular rhythm  Chest: CTAB  Abd: midline vertical incision c/d/i with steri strips in place; +BS, nondistended, nontender  Ext: warm, well perfused, no edema    A/P 31yo POD5 s/p abdominal myomectomy w/ known post op hematoma diagnosed prior to discharge, readmitted yesterday with postop fever concerning for infected hematoma. Patient started on zosyn for BSAbx, however continues to have fevers and tachycardia.   -STAT CBC w/ Diff; continue with daily AM CBCs w/ diff  -Consider ID consult in AM to narrow abx  -Follow up Bcx, Ucx  -Continue Zosyn; Vancomycin added to abx regimen  -Continue IVF  -Continue Motrin, Tylenol, Oxy for pain and/or fevers  -Will continue to monitor VS closely     D/w Dr. Patel RFrankel PGY2

## 2020-11-28 NOTE — PROGRESS NOTE ADULT - ASSESSMENT
POD 5 status post abd myomectomy, readmit with ?infected pelvic hematoma. fevers/chills at home, temp 38.0 here

## 2020-11-28 NOTE — ED ADULT NURSE REASSESSMENT NOTE - NS ED NURSE REASSESS COMMENT FT1
Pt updated on plan of care, sinus tach on tele, safety and fall precautions in place, pt provided ice packs, denies needs at this time, call bell within reach.

## 2020-11-28 NOTE — H&P ADULT - ASSESSMENT
31y/o F now POD#5 s/p abdominal myomectomy with known postoperative hematoma presenting to [facility] ED with postoperative fever. WBC noted to be increased relative to prior. CTAP obtained in ED demonstrative of stable hematoma, inability to r/o infected hematoma given extensive fat stranding.  Patient noted to be persistently tachycardic and febrile to 38.0 while in ED, however with otherwise reassuring vital signs and physical exam. Differential diagnosis includes superinfected hematoma versus pulmonary embolus or gonadal vein thrombosis given tachycardia and low grade fevers in the postoperative setting.

## 2020-11-29 DIAGNOSIS — Z98.890 OTHER SPECIFIED POSTPROCEDURAL STATES: Chronic | ICD-10-CM

## 2020-11-29 LAB
BASOPHILS # BLD AUTO: 0.01 K/UL — SIGNIFICANT CHANGE UP (ref 0–0.2)
BASOPHILS # BLD AUTO: 0.02 K/UL — SIGNIFICANT CHANGE UP (ref 0–0.2)
BASOPHILS NFR BLD AUTO: 0.1 % — SIGNIFICANT CHANGE UP (ref 0–2)
BASOPHILS NFR BLD AUTO: 0.2 % — SIGNIFICANT CHANGE UP (ref 0–2)
CULTURE RESULTS: SIGNIFICANT CHANGE UP
EOSINOPHIL # BLD AUTO: 0.05 K/UL — SIGNIFICANT CHANGE UP (ref 0–0.5)
EOSINOPHIL # BLD AUTO: 0.06 K/UL — SIGNIFICANT CHANGE UP (ref 0–0.5)
EOSINOPHIL NFR BLD AUTO: 0.4 % — SIGNIFICANT CHANGE UP (ref 0–6)
EOSINOPHIL NFR BLD AUTO: 0.5 % — SIGNIFICANT CHANGE UP (ref 0–6)
HCG UR QL: NEGATIVE — SIGNIFICANT CHANGE UP
HCT VFR BLD CALC: 30.5 % — LOW (ref 34.5–45)
HCT VFR BLD CALC: 30.8 % — LOW (ref 34.5–45)
HGB BLD-MCNC: 10.1 G/DL — LOW (ref 11.5–15.5)
HGB BLD-MCNC: 10.3 G/DL — LOW (ref 11.5–15.5)
IMM GRANULOCYTES NFR BLD AUTO: 0.9 % — SIGNIFICANT CHANGE UP (ref 0–1.5)
IMM GRANULOCYTES NFR BLD AUTO: 1.1 % — SIGNIFICANT CHANGE UP (ref 0–1.5)
LYMPHOCYTES # BLD AUTO: 1.31 K/UL — SIGNIFICANT CHANGE UP (ref 1–3.3)
LYMPHOCYTES # BLD AUTO: 1.34 K/UL — SIGNIFICANT CHANGE UP (ref 1–3.3)
LYMPHOCYTES # BLD AUTO: 10.2 % — LOW (ref 13–44)
LYMPHOCYTES # BLD AUTO: 10.3 % — LOW (ref 13–44)
MCHC RBC-ENTMCNC: 30.1 PG — SIGNIFICANT CHANGE UP (ref 27–34)
MCHC RBC-ENTMCNC: 30.6 PG — SIGNIFICANT CHANGE UP (ref 27–34)
MCHC RBC-ENTMCNC: 33.1 GM/DL — SIGNIFICANT CHANGE UP (ref 32–36)
MCHC RBC-ENTMCNC: 33.4 GM/DL — SIGNIFICANT CHANGE UP (ref 32–36)
MCV RBC AUTO: 90.8 FL — SIGNIFICANT CHANGE UP (ref 80–100)
MCV RBC AUTO: 91.4 FL — SIGNIFICANT CHANGE UP (ref 80–100)
MONOCYTES # BLD AUTO: 1.47 K/UL — HIGH (ref 0–0.9)
MONOCYTES # BLD AUTO: 1.62 K/UL — HIGH (ref 0–0.9)
MONOCYTES NFR BLD AUTO: 11.4 % — SIGNIFICANT CHANGE UP (ref 2–14)
MONOCYTES NFR BLD AUTO: 12.5 % — SIGNIFICANT CHANGE UP (ref 2–14)
NEUTROPHILS # BLD AUTO: 9.78 K/UL — HIGH (ref 1.8–7.4)
NEUTROPHILS # BLD AUTO: 9.93 K/UL — HIGH (ref 1.8–7.4)
NEUTROPHILS NFR BLD AUTO: 75.5 % — SIGNIFICANT CHANGE UP (ref 43–77)
NEUTROPHILS NFR BLD AUTO: 76.9 % — SIGNIFICANT CHANGE UP (ref 43–77)
NRBC # BLD: 0 /100 WBCS — SIGNIFICANT CHANGE UP (ref 0–0)
NRBC # BLD: 0 /100 WBCS — SIGNIFICANT CHANGE UP (ref 0–0)
PLATELET # BLD AUTO: 260 K/UL — SIGNIFICANT CHANGE UP (ref 150–400)
PLATELET # BLD AUTO: 289 K/UL — SIGNIFICANT CHANGE UP (ref 150–400)
RBC # BLD: 3.36 M/UL — LOW (ref 3.8–5.2)
RBC # BLD: 3.37 M/UL — LOW (ref 3.8–5.2)
RBC # FLD: 14.2 % — SIGNIFICANT CHANGE UP (ref 10.3–14.5)
RBC # FLD: 14.4 % — SIGNIFICANT CHANGE UP (ref 10.3–14.5)
SPECIMEN SOURCE: SIGNIFICANT CHANGE UP
WBC # BLD: 12.9 K/UL — HIGH (ref 3.8–10.5)
WBC # BLD: 12.95 K/UL — HIGH (ref 3.8–10.5)
WBC # FLD AUTO: 12.9 K/UL — HIGH (ref 3.8–10.5)
WBC # FLD AUTO: 12.95 K/UL — HIGH (ref 3.8–10.5)

## 2020-11-29 PROCEDURE — 74183 MRI ABD W/O CNTR FLWD CNTR: CPT | Mod: 26

## 2020-11-29 PROCEDURE — 72197 MRI PELVIS W/O & W/DYE: CPT | Mod: 26

## 2020-11-29 PROCEDURE — 99255 IP/OBS CONSLTJ NEW/EST HI 80: CPT | Mod: GC

## 2020-11-29 RX ORDER — TRAMADOL HYDROCHLORIDE 50 MG/1
25 TABLET ORAL ONCE
Refills: 0 | Status: DISCONTINUED | OUTPATIENT
Start: 2020-11-29 | End: 2020-11-29

## 2020-11-29 RX ORDER — TRAMADOL HYDROCHLORIDE 50 MG/1
25 TABLET ORAL EVERY 6 HOURS
Refills: 0 | Status: DISCONTINUED | OUTPATIENT
Start: 2020-11-29 | End: 2020-12-04

## 2020-11-29 RX ADMIN — Medication 975 MILLIGRAM(S): at 06:30

## 2020-11-29 RX ADMIN — TRAMADOL HYDROCHLORIDE 25 MILLIGRAM(S): 50 TABLET ORAL at 14:46

## 2020-11-29 RX ADMIN — Medication 250 MILLIGRAM(S): at 02:47

## 2020-11-29 RX ADMIN — PIPERACILLIN AND TAZOBACTAM 25 GRAM(S): 4; .5 INJECTION, POWDER, LYOPHILIZED, FOR SOLUTION INTRAVENOUS at 23:06

## 2020-11-29 RX ADMIN — Medication 975 MILLIGRAM(S): at 05:43

## 2020-11-29 RX ADMIN — Medication 325 MILLIGRAM(S): at 12:22

## 2020-11-29 RX ADMIN — PIPERACILLIN AND TAZOBACTAM 25 GRAM(S): 4; .5 INJECTION, POWDER, LYOPHILIZED, FOR SOLUTION INTRAVENOUS at 15:19

## 2020-11-29 RX ADMIN — Medication 500 MILLIGRAM(S): at 12:22

## 2020-11-29 RX ADMIN — TRAMADOL HYDROCHLORIDE 25 MILLIGRAM(S): 50 TABLET ORAL at 06:34

## 2020-11-29 RX ADMIN — SIMETHICONE 80 MILLIGRAM(S): 80 TABLET, CHEWABLE ORAL at 12:22

## 2020-11-29 RX ADMIN — PIPERACILLIN AND TAZOBACTAM 25 GRAM(S): 4; .5 INJECTION, POWDER, LYOPHILIZED, FOR SOLUTION INTRAVENOUS at 05:42

## 2020-11-29 RX ADMIN — Medication 250 MILLIGRAM(S): at 13:22

## 2020-11-29 RX ADMIN — SIMETHICONE 80 MILLIGRAM(S): 80 TABLET, CHEWABLE ORAL at 00:04

## 2020-11-29 RX ADMIN — TRAMADOL HYDROCHLORIDE 25 MILLIGRAM(S): 50 TABLET ORAL at 07:00

## 2020-11-29 RX ADMIN — SODIUM CHLORIDE 125 MILLILITER(S): 9 INJECTION, SOLUTION INTRAVENOUS at 17:38

## 2020-11-29 RX ADMIN — HEPARIN SODIUM 5000 UNIT(S): 5000 INJECTION INTRAVENOUS; SUBCUTANEOUS at 21:14

## 2020-11-29 RX ADMIN — SIMETHICONE 80 MILLIGRAM(S): 80 TABLET, CHEWABLE ORAL at 05:43

## 2020-11-29 RX ADMIN — TRAMADOL HYDROCHLORIDE 25 MILLIGRAM(S): 50 TABLET ORAL at 13:56

## 2020-11-29 RX ADMIN — HEPARIN SODIUM 5000 UNIT(S): 5000 INJECTION INTRAVENOUS; SUBCUTANEOUS at 09:26

## 2020-11-29 NOTE — CONSULT NOTE ADULT - SUBJECTIVE AND OBJECTIVE BOX
Patient is a 30y old  Female who presents with a chief complaint of postoperative fever (2020 06:20)    HPI:    31 y/o F with myomectomy performed on  returned for post-operative fevers.    Pt underwent elected myomectomy on . Post-operative course was c/b bleeding and need for transfusions. Pt recieved clindamycin and ciprofloxacin for possible wound post-op cellulitis/wound infection. Pt returned home on  evening feeling well, but that night into the following morning she developed profuse diaphoresis, fever, and chills. Denies dysuria, abdominal pain, diarrhea, surgical site pain or discharge. Pt has been having dry cough since operation, no recent changes. Does admit to some right sided back pain lower than her flank which started around .    In the hospital pt has been febrile to tmax 102.6 with leukocytosis trending up to 12.95. UA and Ucx were negative, AST/ALT mildly elevated but T bili/ALP grossly normlal. COVID PCR negative, Bcx negative x 2 sets to date. CT A/P shows stable pelvic hematoma noted originally on CT A/P . Superficial infection considered based on fat stranding. Duplex of LEs negative. Pt has been monitored on Vancomycin and Zosyn, feeling somewhat improved but still spiking fevers.     prior hospital charts reviewed [ x ]  primary team notes reviewed [ x ]  other consultant notes reviewed [x ]  PAST MEDICAL & SURGICAL HISTORY:  Uterine fibroid  S/P myomectomy on 2020    Erythrocytosis  monitored by hematologist    S/P myomectomy  2020    History of elective   x 2      Allergies  Ceclor (Rash)    ANTIMICROBIALS (past 90 days)  MEDICATIONS  (STANDING):    metroNIDAZOLE  IVPB   100 mL/Hr IV Intermittent (20 @ 09:43)    piperacillin/tazobactam IVPB.   200 mL/Hr IV Intermittent (20 @ 07:16)    piperacillin/tazobactam IVPB..   25 mL/Hr IV Intermittent (20 @ 15:19)   25 mL/Hr IV Intermittent (20 @ 05:42)   25 mL/Hr IV Intermittent (20 @ 22:33)   25 mL/Hr IV Intermittent (20 @ 14:57)    vancomycin  IVPB   250 mL/Hr IV Intermittent (20 @ 02:47)    vancomycin  IVPB   250 mL/Hr IV Intermittent (20 @ 13:22)    vancomycin  IVPB.   250 mL/Hr IV Intermittent (20 @ 02:23)      ANTIMICROBIALS:    piperacillin/tazobactam IVPB.. 3.375 every 8 hours  vancomycin  IVPB    vancomycin  IVPB 1000 every 12 hours    OTHER MEDS: MEDICATIONS  (STANDING):  acetaminophen   Tablet .. 975 every 6 hours PRN  heparin   Injectable 5000 every 12 hours  ibuprofen  Tablet. 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    FAMILY HISTORY: Hypertension in both parents    REVIEW OF SYSTEMS  [  ] ROS unobtainable because:    [x  ] All other systems negative except as noted below:	    Constitutional:  [x ] fever [x ] chills  [ ] weight loss  [ ] weakness  Skin:  [ ] rash [ ] phlebitis	  Eyes: [ ] icterus [ ] pain  [ ] discharge	  ENMT: [ ] sore throat  [ ] thrush [ ] ulcers [ ] exudates  Respiratory: [ ] dyspnea [ ] hemoptysis [ ] cough [ ] sputum	  Cardiovascular:  [ ] chest pain [ ] palpitations [ ] edema	  Gastrointestinal:  [ ] nausea [ ] vomiting [ ] diarrhea [ ] constipation [ ] pain	  Genitourinary:  [ ] dysuria [ ] frequency [ ] hematuria [ ] discharge [ ] flank pain  [ ] incontinence  Musculoskeletal:  [ ] myalgias [ ] arthralgias [ ] arthritis  [ ] back pain  Neurological:  [ ] headache [ ] seizures  [ ] confusion/altered mental status  Psychiatric:  [ ] anxiety [ ] depression	  Hematology/Lymphatics:  [ ] lymphadenopathy  Endocrine:  [ ] adrenal [ ] thyroid  Allergic/Immunologic:	 [ ] transplant [ ] seasonal    Vital Signs Last 24 Hrs  T(F): 98.3 (20 @ 12:07), Max: 102.6 (20 @ 05:35)  Vital Signs Last 24 Hrs  HR: 101 (20 @ 12:07) (100 - 120)  BP: 121/82 (20 @ 12:07) (117/80 - 124/78)  RR: 18 (20 @ 12:07)  SpO2: 98% (20 @ 12:07) (96% - 98%)  Wt(kg): --    PHYSICAL EXAM:  Constitutional: non-toxic, no distress  HEAD/EYES: anicteric, no conjunctival injection  ENT:  supple, no thrush  Cardiovascular:   normal S1, S2, no murmur, no edema  Respiratory:  clear BS bilaterally, no wheezes, no rales  GI:  soft, non-tender, normal bowel sounds  :  no lovett, no CVA tenderness, well healing surgical scar, no signs of superficial infection or drainage  Musculoskeletal:  no synovitis, normal ROM, right lumbar paraspinal muscle TTP  Neurologic: awake and alert, normal strength, no focal findings  Skin:  no rash, no erythema, no phlebitis  Heme/Onc: no lymphadenopathy   Psychiatric:  awake, alert, appropriate mood                            10.3   12.95 )-----------( 289      ( 2020 07:17 )             30.8         138  |  104  |  6<L>  ----------------------------<  104<H>  4.0   |  22  |  0.60    Ca    9.6      2020 23:25    TPro  6.5  /  Alb  3.7  /  TBili  0.6  /  DBili  x   /  AST  103<H>  /  ALT  88<H>  /  AlkPhos  135<H>      Urinalysis Basic - ( 2020 23:52 )    Color: Yellow / Appearance: Clear / S.019 / pH: x  Gluc: x / Ketone: Trace  / Bili: Negative / Urobili: Negative   Blood: x / Protein: Negative / Nitrite: Negative   Leuk Esterase: Negative / RBC: x / WBC x   Sq Epi: x / Non Sq Epi: x / Bacteria: x    MICROBIOLOGY:  Culture - Urine (collected 2020 04:56)  Source: .Urine Clean Catch (Midstream)  Final Report (2020 00:44):    <10,000 CFU/mL Normal Urogenital Elvira    Culture - Blood (collected 2020 00:58)  Source: .Blood Blood-Peripheral  Preliminary Report (2020 01:02):    No growth to date.    Culture - Blood (collected 2020 00:58)  Source: .Blood Blood-Peripheral  Preliminary Report (2020 01:02):    No growth to date.                  RADIOLOGY:  < from: [facility] Duplex Lower Ext Vein Scan, Bilat (20 @ 09:44) >    IMPRESSION:  No evidence of deep venous thrombosis in either lower extremity.    < from: CT Abdomen and Pelvis w/ IV Cont (20 @ 23:54) >  IMPRESSION:    No significant change in large pelvic hematoma displacing the uterus left of midline. No new site of bleeding or progressive mass effect. Superinfected hematoma cannot be excluded given extensive surroundingfat stranding and blood products.             Patient is a 30y old  Female who presents with a chief complaint of postoperative fever (2020 06:20)    HPI:    29 y/o F with myomectomy performed on  returned for post-operative fevers.    Pt underwent elected myomectomy on . Post-operative course was c/b bleeding and need for transfusions. Pt recieved clindamycin and ciprofloxacin for possible wound post-op cellulitis/wound infection. Pt returned home on  evening feeling well, but that night into the following morning she developed profuse diaphoresis, fever, and chills. Denies dysuria, abdominal pain, diarrhea, surgical site pain or discharge. Pt has been having dry cough since operation, no recent changes. Does admit to some right sided back pain lower than her flank which started around .    In the hospital pt has been febrile to tmax 102.6 with leukocytosis trending up to 12.95. UA and Ucx were negative, AST/ALT mildly elevated but T bili/ALP grossly normlal. COVID PCR negative, Bcx negative x 2 sets to date. CT A/P shows stable pelvic hematoma noted originally on CT A/P . Superficial infection considered based on fat stranding. Duplex of LEs negative. Pt has been monitored on Vancomycin and Zosyn, feeling somewhat improved but still spiking fevers.     prior hospital charts reviewed [ x ]  primary team notes reviewed [ x ]  other consultant notes reviewed [x ]  PAST MEDICAL & SURGICAL HISTORY:  Uterine fibroid  S/P myomectomy on 2020    Erythrocytosis  monitored by hematologist    S/P myomectomy  2020    History of elective   x 2      Allergies  Ceclor (Rash)    ANTIMICROBIALS (past 90 days)  MEDICATIONS  (STANDING):    metroNIDAZOLE  IVPB   100 mL/Hr IV Intermittent (20 @ 09:43)    piperacillin/tazobactam IVPB.   200 mL/Hr IV Intermittent (20 @ 07:16)    piperacillin/tazobactam IVPB..   25 mL/Hr IV Intermittent (20 @ 15:19)   25 mL/Hr IV Intermittent (20 @ 05:42)   25 mL/Hr IV Intermittent (20 @ 22:33)   25 mL/Hr IV Intermittent (20 @ 14:57)    vancomycin  IVPB   250 mL/Hr IV Intermittent (20 @ 02:47)    vancomycin  IVPB   250 mL/Hr IV Intermittent (20 @ 13:22)    vancomycin  IVPB.   250 mL/Hr IV Intermittent (20 @ 02:23)      ANTIMICROBIALS:    piperacillin/tazobactam IVPB.. 3.375 every 8 hours  vancomycin  IVPB    vancomycin  IVPB 1000 every 12 hours    OTHER MEDS: MEDICATIONS  (STANDING):  acetaminophen   Tablet .. 975 every 6 hours PRN  heparin   Injectable 5000 every 12 hours  ibuprofen  Tablet. 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, alcohol or drug abuse    FAMILY HISTORY: Hypertension in both parents    REVIEW OF SYSTEMS  [  ] ROS unobtainable because:    [x  ] All other systems negative except as noted below:	    Constitutional:  [x ] fever [x ] chills  [ ] weight loss  [ ] weakness  Skin:  [ ] rash [ ] phlebitis	  Eyes: [ ] icterus [ ] pain  [ ] discharge	  ENMT: [ ] sore throat  [ ] thrush [ ] ulcers [ ] exudates  Respiratory: [ ] dyspnea [ ] hemoptysis [ ] cough [ ] sputum	  Cardiovascular:  [ ] chest pain [ ] palpitations [ ] edema	  Gastrointestinal:  [ ] nausea [ ] vomiting [ ] diarrhea [ ] constipation [ ] pain	  Genitourinary:  [ ] dysuria [ ] frequency [ ] hematuria [ ] discharge [ ] flank pain  [ ] incontinence  Musculoskeletal:  [ ] myalgias [ ] arthralgias [ ] arthritis  [ ] back pain  Neurological:  [ ] headache [ ] seizures  [ ] confusion/altered mental status  Psychiatric:  [ ] anxiety [ ] depression	  Hematology/Lymphatics:  [ ] lymphadenopathy  Endocrine:  [ ] adrenal [ ] thyroid  Allergic/Immunologic:	 [ ] transplant [ ] seasonal    Vital Signs Last 24 Hrs  T(F): 98.3 (20 @ 12:07), Max: 102.6 (20 @ 05:35)  Vital Signs Last 24 Hrs  HR: 101 (20 @ 12:07) (100 - 120)  BP: 121/82 (20 @ 12:07) (117/80 - 124/78)  RR: 18 (20 @ 12:07)  SpO2: 98% (20 @ 12:07) (96% - 98%)  Wt(kg): --    PHYSICAL EXAM:  Constitutional: non-toxic, no distress  HEAD/EYES: anicteric, no conjunctival injection  ENT:  supple, no thrush  Cardiovascular:   normal S1, S2, no murmur, no edema  Respiratory:  clear BS bilaterally, no wheezes, no rales  GI:  soft, non-tender, normal bowel sounds  :  no lovett, no CVA tenderness, well healing surgical scar, no signs of superficial infection or drainage  Musculoskeletal:  no synovitis, normal ROM, right lumbar paraspinal muscle TTP  Neurologic: awake and alert, normal strength, no focal findings  Skin:  no rash, no erythema, no phlebitis  Heme/Onc: no lymphadenopathy   Psychiatric:  awake, alert, appropriate mood                            10.3   12.95 )-----------( 289      ( 2020 07:17 )             30.8         138  |  104  |  6<L>  ----------------------------<  104<H>  4.0   |  22  |  0.60    Ca    9.6      2020 23:25    TPro  6.5  /  Alb  3.7  /  TBili  0.6  /  DBili  x   /  AST  103<H>  /  ALT  88<H>  /  AlkPhos  135<H>      Urinalysis Basic - ( 2020 23:52 )    Color: Yellow / Appearance: Clear / S.019 / pH: x  Gluc: x / Ketone: Trace  / Bili: Negative / Urobili: Negative   Blood: x / Protein: Negative / Nitrite: Negative   Leuk Esterase: Negative / RBC: x / WBC x   Sq Epi: x / Non Sq Epi: x / Bacteria: x    MICROBIOLOGY:  Culture - Urine (collected 2020 04:56)  Source: .Urine Clean Catch (Midstream)  Final Report (2020 00:44):    <10,000 CFU/mL Normal Urogenital Elvira    Culture - Blood (collected 2020 00:58)  Source: .Blood Blood-Peripheral  Preliminary Report (2020 01:02):    No growth to date.    Culture - Blood (collected 2020 00:58)  Source: .Blood Blood-Peripheral  Preliminary Report (2020 01:02):    No growth to date.                  RADIOLOGY:  < from: [facility] Duplex Lower Ext Vein Scan, Bilat (20 @ 09:44) >    IMPRESSION:  No evidence of deep venous thrombosis in either lower extremity.    < from: CT Abdomen and Pelvis w/ IV Cont (20 @ 23:54) >  IMPRESSION:    No significant change in large pelvic hematoma displacing the uterus left of midline. No new site of bleeding or progressive mass effect. Superinfected hematoma cannot be excluded given extensive surroundingfat stranding and blood products.

## 2020-11-29 NOTE — CONSULT NOTE ADULT - ASSESSMENT
----- Message from Cecily Padilla MD sent at 3/26/2018  1:20 PM CDT -----  Patient found to have UTI, please call Macrobid 100 mg twice daily for 7 days, 14 pills. Advised to take it with food to avoid GI upset. Recheck UA in 2 weeks. 31 y/o F with myomectomy performed on 11/23 returned for post-operative fevers.    SIRS vs Sepsis in post-operative patient  -myomectomy performed on 11/23  - serial CT A/P noted stable pelvic hematoma with adjacent fat stranding representing possible superinfection  -no other localizing symptoms  -planned for MRI. Agree with plan for possible IR drainage of hematoma  -f/u Bcx, NTD  -send HIV test  -mild transaminitis noted on admission, would trend LFTs  -c/w vancomycin 1g q12, check trough prior to 4th dose  -c/w zosyn for now

## 2020-11-29 NOTE — CHART NOTE - NSCHARTNOTEFT_GEN_A_CORE
Pt seen and examined at bedside after RN reported pt febrile again to 39.3. Pt states she feels well and asked RN to take her temp because she was curious, not symptomatic. She continues to endorse an occasional dry cough and lower back pain during ambulation and while at rest in bed but otherwise denies any chills, CP, SOB, n/v, or abdominal pain. Manual HR 96.     Vital Signs Last 24 Hrs  T(C): 39.3 (29 Nov 2020 17:15), Max: 39.3 (29 Nov 2020 17:15)  T(F): 102.8 (29 Nov 2020 17:15), Max: 102.8 (29 Nov 2020 17:15)  HR: 102 (29 Nov 2020 17:15) (100 - 120)  BP: 138/87 (29 Nov 2020 17:15) (118/82 - 138/87)  BP(mean): --  RR: 18 (29 Nov 2020 17:15) (18 - 18)  SpO2: 97% (29 Nov 2020 17:15) (96% - 98%)    GEN: well appearing, NAD  CV: RRR,   RESP: CTAB  AB: NTND   Incision: midline vertical incision C/D/I, well healing    BACK: R sided CVA tenderness    UA unremarkable on admission, UCx with normal vaginal jose, BCx NGTD. Fever remains of unknown etiology. Back pain likely secondary to mass effect from hematoma, pt scheduled to undergo MRI @ 2100 for further assessment. Pt currently receiving motrin, IV fluid hydration, and ice packs for fever, acetaminophen d/c en nakita of elevated LFTs. Will continue to monitor closely.    Dr. Wise updated on pt status.    Kelley Cornejo MD  OBGYN PGY2

## 2020-11-29 NOTE — PROGRESS NOTE ADULT - PROBLEM SELECTOR PLAN 1
CV: cont to monitor tachycardia- likely result of infectious process, CTA on prior admission r/o PE, dopplers yesterday r/o DVT  Resp: cont Incentive spirometry  GI: regular diet  : voiding without difficulty  ID: on Vanc/zosyn, will consult ID today for reccomendations, blood and urine cultures pending from 11/28. Discussed yesterday with IR, need for MRI prior to drainage if pt does not improve clinically, will follow up ID reccs and reevaluate for need for MRI   DVT: cont HSQ for DVT ppx

## 2020-11-29 NOTE — PROGRESS NOTE ADULT - ASSESSMENT
Pt status post abdominal myomectomy on 11/24 c/w post operative hematoma and transfusions, presented HD2 with fevers, on abx. Most recent CT showing pelvic hematoma stable in size

## 2020-11-29 NOTE — PROGRESS NOTE ADULT - ASSESSMENT
**INCOMPLETE** **INCOMPLETE**  29y/o F now POD#6 s/p abdominal myomectomy with known postoperative deemed stable hematoma admitted with postoperative fever. CTAP obtained in ED demonstrative of stable hematoma, inability to r/o infected hematoma given extensive fat stranding.  Patient noted to be persistently tachycardic and febrile x2 overnight despite Zosyn administration. Differential diagnosis includes superinfected hematoma versus pulmonary embolus or gonadal vein thrombosis given tachycardia and low grade fevers in the postoperative setting. Will consult ID. 29y/o F now POD#6 s/p abdominal myomectomy with known postoperative deemed stable hematoma admitted with postoperative fever. CTAP obtained in ED demonstrative of stable hematoma, inability to r/o infected hematoma given extensive fat stranding.  Patient noted to be persistently tachycardic and febrile x2 overnight despite Zosyn administration, Van added to abx regimen for broader coverage.     Problem: Fever, unspecified fever cause.      Plan:   CV: cont to monitor tachycardia- likely result of infectious process, CTA on prior admission r/o PE, dopplers yesterday r/o DVT  Resp: cont Incentive spirometry  GI: regular diet  : voiding without difficulty  ID: on Vanc/zosyn, will consult ID today for reccomendations, blood and urine cultures pending from 11/28. Discussed yesterday with IR, need for MRI prior to drainage if pt does not improve clinically, will follow up ID reccs and reevaluate for need for MRI   DVT: cont HSQ for DVT ppx.     Kelley Cornejo MD  OBGYN PGY2

## 2020-11-29 NOTE — PROGRESS NOTE ADULT - SUBJECTIVE AND OBJECTIVE BOX
29yo s/p  POD #     Pt seen and examined at bedside. Pt febrile to 39.2 overnight & endorsing poor pain control with current regimen. Otherwise *** she denies SOB/CP/palpitations, fever/chills, nausea/emesis. Tolerating regular diet.  +OOB,  +Flatus, +Voiding spontaneously    MEDICATIONS  (STANDING):  ascorbic acid 500 milliGRAM(s) Oral daily  ferrous    sulfate 325 milliGRAM(s) Oral daily  heparin   Injectable 5000 Unit(s) SubCutaneous every 12 hours  influenza   Vaccine 0.5 milliLiter(s) IntraMuscular once  lactated ringers. 500 milliLiter(s) (125 mL/Hr) IV Continuous <Continuous>  piperacillin/tazobactam IVPB.. 3.375 Gram(s) IV Intermittent every 8 hours  senna 2 Tablet(s) Oral at bedtime  simethicone 80 milliGRAM(s) Chew every 6 hours  traMADol 25 milliGRAM(s) Oral once  vancomycin  IVPB      vancomycin  IVPB 1000 milliGRAM(s) IV Intermittent every 12 hours    MEDICATIONS  (PRN):  acetaminophen   Tablet .. 975 milliGRAM(s) Oral every 6 hours PRN Temp greater or equal to 38C (100.4F), Mild Pain (1 - 3)  ibuprofen  Tablet. 600 milliGRAM(s) Oral every 6 hours PRN Moderate Pain (4 - 6)        Vital Signs Last 24 Hrs  T(C): 39.2 (29 Nov 2020 05:35), Max: 39.2 (29 Nov 2020 05:35)  T(F): 102.6 (29 Nov 2020 05:35), Max: 102.6 (29 Nov 2020 05:35)  HR: 111 (29 Nov 2020 05:35) (100 - 120)  BP: 122/83 (29 Nov 2020 05:35) (116/77 - 140/84)  BP(mean): --  RR: 18 (29 Nov 2020 05:35) (18 - 18)  SpO2: 96% (29 Nov 2020 05:35) (96% - 98%)    11-28 @ 07:01  -  11-29 @ 06:20  --------------------------------------------------------  IN: 720 mL / OUT: 2150 mL / NET: -1430 mL        PHYSICAL EXAM:  Gen: NAD, A+O x 3  CV: RRR  Pulm: CTA BL  Abd: Soft, appropriately tender, non distended, no rebound or guarding, +BS  Incision: Clean, dry and intact; Steris in place  Extremities: No swelling or calf tenderness, SCDs in place    Labs, additional tests:             10.1   12.90 )-----------( 260      ( 11-29 @ 00:19 )             30.5                10.4   12.71 )-----------( 252      ( 11-28 @ 11:10 )             30.8                11.2   11.32 )-----------( 252      ( 11-27 @ 23:25 )             34.0                9.7    9.13  )-----------( 198      ( 11-26 @ 17:17 )             27.9                9.7    9.27  )-----------( 197      ( 11-26 @ 13:46 )             29.1                9.4    9.12  )-----------( 172      ( 11-26 @ 06:52 )             27.4       11-27    138  |  104  |  6<L>  ----------------------------<  104<H>  4.0   |  22  |  0.60    Ca    9.6      27 Nov 2020 23:25    TPro  6.5  /  Alb  3.7  /  TBili  0.6  /  DBili  x   /  AST  103<H>  /  ALT  88<H>  /  AlkPhos  135<H>  11-27       31yo HD#3    Pt seen and examined at bedside. Pt febrile x2 overnight to 38.1 at 2230 and 39.2 at 0530. She is currently endorsing good pain control with current regimen but has felt new onset back pain during ambulation recently and currently feels hot and has a cough that occasionally produces phlegm. Otherwise she is without complaint and denies SOB/CP/palpitations, fever/chills, nausea/emesis. Tolerating regular diet.  +OOB, +Voiding spontaneously    MEDICATIONS  (STANDING):  ascorbic acid 500 milliGRAM(s) Oral daily  ferrous    sulfate 325 milliGRAM(s) Oral daily  heparin   Injectable 5000 Unit(s) SubCutaneous every 12 hours  influenza   Vaccine 0.5 milliLiter(s) IntraMuscular once  lactated ringers. 500 milliLiter(s) (125 mL/Hr) IV Continuous <Continuous>  piperacillin/tazobactam IVPB.. 3.375 Gram(s) IV Intermittent every 8 hours  senna 2 Tablet(s) Oral at bedtime  simethicone 80 milliGRAM(s) Chew every 6 hours  traMADol 25 milliGRAM(s) Oral once  vancomycin  IVPB      vancomycin  IVPB 1000 milliGRAM(s) IV Intermittent every 12 hours    MEDICATIONS  (PRN):  acetaminophen   Tablet .. 975 milliGRAM(s) Oral every 6 hours PRN Temp greater or equal to 38C (100.4F), Mild Pain (1 - 3)  ibuprofen  Tablet. 600 milliGRAM(s) Oral every 6 hours PRN Moderate Pain (4 - 6)        Vital Signs Last 24 Hrs  T(C): 39.2 (29 Nov 2020 05:35), Max: 39.2 (29 Nov 2020 05:35)  T(F): 102.6 (29 Nov 2020 05:35), Max: 102.6 (29 Nov 2020 05:35)  HR: 111 (29 Nov 2020 05:35) (100 - 120)  BP: 122/83 (29 Nov 2020 05:35) (116/77 - 140/84)  BP(mean): --  RR: 18 (29 Nov 2020 05:35) (18 - 18)  SpO2: 96% (29 Nov 2020 05:35) (96% - 98%)    11-28 @ 07:01  -  11-29 @ 06:20  --------------------------------------------------------IN: 720 mL / OUT: 2150 mL / NET: -1430 mL         PHYSICAL EXAM:  Gen: NAD, A+O x 3  CV: RRR  Pulm: CTA BL  Abd: Soft, appropriately tender, non distended, no rebound or guarding,   Incision: midline infraumbilical incision Clean, dry and intact; no signs of infection  Extremities: No swelling or calf tenderness, SCDs in place    Labs, additional tests:             10.1   12.90 )-----------( 260      ( 11-29 @ 00:19 )             30.5                10.4   12.71 )-----------( 252      ( 11-28 @ 11:10 )             30.8                11.2   11.32 )-----------( 252      ( 11-27 @ 23:25 )             34.0                9.7    9.13  )-----------( 198      ( 11-26 @ 17:17 )             27.9                9.7    9.27  )-----------( 197      ( 11-26 @ 13:46 )             29.1                9.4    9.12  )-----------( 172      ( 11-26 @ 06:52 )             27.4       11-27    138  |  104  |  6<L>  ----------------------------<  104<H>  4.0   |  22  |  0.60    Ca    9.6      27 Nov 2020 23:25    TPro  6.5  /  Alb  3.7  /  TBili  0.6  /  DBili  x   /  AST  103<H>  /  ALT  88<H>  /  AlkPhos  135<H>  11-27       31yo HD#3 POD#6    Pt seen and examined at bedside. Pt febrile x2 overnight to 38.1 at 2230 and 39.2 at 0530. She is currently endorsing good pain control with current regimen but has felt new onset back pain during ambulation recently and currently feels hot and has a cough that occasionally produces phlegm. Otherwise she is without complaint and denies SOB/CP/palpitations, fever/chills, nausea/emesis. Tolerating regular diet.  +OOB, +Voiding spontaneously    MEDICATIONS  (STANDING):  ascorbic acid 500 milliGRAM(s) Oral daily  ferrous    sulfate 325 milliGRAM(s) Oral daily  heparin   Injectable 5000 Unit(s) SubCutaneous every 12 hours  influenza   Vaccine 0.5 milliLiter(s) IntraMuscular once  lactated ringers. 500 milliLiter(s) (125 mL/Hr) IV Continuous <Continuous>  piperacillin/tazobactam IVPB.. 3.375 Gram(s) IV Intermittent every 8 hours  senna 2 Tablet(s) Oral at bedtime  simethicone 80 milliGRAM(s) Chew every 6 hours  traMADol 25 milliGRAM(s) Oral once  vancomycin  IVPB      vancomycin  IVPB 1000 milliGRAM(s) IV Intermittent every 12 hours    MEDICATIONS  (PRN):  acetaminophen   Tablet .. 975 milliGRAM(s) Oral every 6 hours PRN Temp greater or equal to 38C (100.4F), Mild Pain (1 - 3)  ibuprofen  Tablet. 600 milliGRAM(s) Oral every 6 hours PRN Moderate Pain (4 - 6)        Vital Signs Last 24 Hrs  T(C): 39.2 (29 Nov 2020 05:35), Max: 39.2 (29 Nov 2020 05:35)  T(F): 102.6 (29 Nov 2020 05:35), Max: 102.6 (29 Nov 2020 05:35)  HR: 111 (29 Nov 2020 05:35) (100 - 120)  BP: 122/83 (29 Nov 2020 05:35) (116/77 - 140/84)  BP(mean): --  RR: 18 (29 Nov 2020 05:35) (18 - 18)  SpO2: 96% (29 Nov 2020 05:35) (96% - 98%)    11-28 @ 07:01  -  11-29 @ 06:20  --------------------------------------------------------IN: 720 mL / OUT: 2150 mL / NET: -1430 mL         PHYSICAL EXAM:  Gen: NAD, A+O x 3  CV: RRR  Pulm: CTA BL  Abd: Soft, appropriately tender, non distended, no rebound or guarding,   Incision: midline infraumbilical incision Clean, dry and intact; no signs of infection  Extremities: No swelling or calf tenderness, SCDs in place    Labs, additional tests:             10.1   12.90 )-----------( 260      ( 11-29 @ 00:19 )             30.5                10.4   12.71 )-----------( 252      ( 11-28 @ 11:10 )             30.8                11.2   11.32 )-----------( 252      ( 11-27 @ 23:25 )             34.0                9.7    9.13  )-----------( 198      ( 11-26 @ 17:17 )             27.9                9.7    9.27  )-----------( 197      ( 11-26 @ 13:46 )             29.1                9.4    9.12  )-----------( 172      ( 11-26 @ 06:52 )             27.4       11-27    138  |  104  |  6<L>  ----------------------------<  104<H>  4.0   |  22  |  0.60    Ca    9.6      27 Nov 2020 23:25    TPro  6.5  /  Alb  3.7  /  TBili  0.6  /  DBili  x   /  AST  103<H>  /  ALT  88<H>  /  AlkPhos  135<H>  11-27       29yo HD#3 POD#6    Pt seen and examined at bedside. Pt febrile x2 overnight to 38.1 at 2230 and 39.2 at 0530. She is currently endorsing good pain control with current regimen but has felt new onset back pain during ambulation recently and currently feels hot and has a cough that occasionally produces phlegm. Otherwise she is without complaint and denies SOB/CP/palpitations, chills, nausea/emesis. Tolerating regular diet.  +OOB, +Voiding spontaneously    MEDICATIONS  (STANDING):  ascorbic acid 500 milliGRAM(s) Oral daily  ferrous    sulfate 325 milliGRAM(s) Oral daily  heparin   Injectable 5000 Unit(s) SubCutaneous every 12 hours  influenza   Vaccine 0.5 milliLiter(s) IntraMuscular once  lactated ringers. 500 milliLiter(s) (125 mL/Hr) IV Continuous <Continuous>  piperacillin/tazobactam IVPB.. 3.375 Gram(s) IV Intermittent every 8 hours  senna 2 Tablet(s) Oral at bedtime  simethicone 80 milliGRAM(s) Chew every 6 hours  traMADol 25 milliGRAM(s) Oral once  vancomycin  IVPB      vancomycin  IVPB 1000 milliGRAM(s) IV Intermittent every 12 hours    MEDICATIONS  (PRN):  acetaminophen   Tablet .. 975 milliGRAM(s) Oral every 6 hours PRN Temp greater or equal to 38C (100.4F), Mild Pain (1 - 3)  ibuprofen  Tablet. 600 milliGRAM(s) Oral every 6 hours PRN Moderate Pain (4 - 6)        Vital Signs Last 24 Hrs  T(C): 39.2 (29 Nov 2020 05:35), Max: 39.2 (29 Nov 2020 05:35)  T(F): 102.6 (29 Nov 2020 05:35), Max: 102.6 (29 Nov 2020 05:35)  HR: 111 (29 Nov 2020 05:35) (100 - 120)  BP: 122/83 (29 Nov 2020 05:35) (116/77 - 140/84)  BP(mean): --  RR: 18 (29 Nov 2020 05:35) (18 - 18)  SpO2: 96% (29 Nov 2020 05:35) (96% - 98%)    11-28 @ 07:01  -  11-29 @ 06:20  --------------------------------------------------------IN: 720 mL / OUT: 2150 mL / NET: -1430 mL         PHYSICAL EXAM:  Gen: NAD, A+O x 3  CV: RRR  Pulm: CTA BL  Abd: Soft, appropriately tender, non distended, no rebound or guarding,   Incision: midline infraumbilical incision Clean, dry and intact; no signs of infection  Extremities: No swelling or calf tenderness, SCDs in place    Labs, additional tests:             10.1   12.90 )-----------( 260      ( 11-29 @ 00:19 )             30.5                10.4   12.71 )-----------( 252      ( 11-28 @ 11:10 )             30.8                11.2   11.32 )-----------( 252      ( 11-27 @ 23:25 )             34.0                9.7    9.13  )-----------( 198      ( 11-26 @ 17:17 )             27.9                9.7    9.27  )-----------( 197      ( 11-26 @ 13:46 )             29.1                9.4    9.12  )-----------( 172      ( 11-26 @ 06:52 )             27.4       11-27    138  |  104  |  6<L>  ----------------------------<  104<H>  4.0   |  22  |  0.60    Ca    9.6      27 Nov 2020 23:25    TPro  6.5  /  Alb  3.7  /  TBili  0.6  /  DBili  x   /  AST  103<H>  /  ALT  88<H>  /  AlkPhos  135<H>  11-27

## 2020-11-29 NOTE — CONSULT NOTE ADULT - ATTENDING COMMENTS
30 f s/p myomectomy 11/23 c/b large pelvic hematoma, discharged 11/26 and came back 11/27 with fever, chills, diaphoresis  here persistently febrile, WBC: 12.9  blood and urine cx negative  abd/pelvis CT: No significant change in large pelvic hematoma displacing the uterus left of midline. No new site of bleeding or progressive mass effect. Superinfected hematoma cannot be excluded given extensive surrounding fat stranding and blood products.    fever, leukocytosis, sepsis after myomectomy with large pelvic hematoma, likely infected     * pelvic MRI  * f/u with IR for drainage  * c/w zosyn 3.375 q 8 and vanco 1 q 12 for now  * monitor the vanco trough and renal function to prevent nephrotoxicity    The above assessment and plan was discussed with the primary team    Mare Dominguez MD  Pager [phone]  After 5pm and on weekends call [phone]

## 2020-11-29 NOTE — PROGRESS NOTE ADULT - SUBJECTIVE AND OBJECTIVE BOX
Gyn Progress Note HD 2    Subjective:     Pt seen and examined at bedside. Had a temp this AM 39.2., c/o chills currently. States she feels mildly better than yesterday. Denies CP, SOB, N, V. Tolerating regular diet, ambulating without difficultly. Pt had two temperatures overnight. Started on Vancomycin for broader coverage. c/o right sided back pain.     Objective:  T(F): 102.6 (20 @ 05:35), Max: 102.6 (20 @ 05:35)  HR: 111 (20 @ 05:35) (100 - 120)  BP: 122/83 (20 @ 05:35) (116/77 - 140/84)  RR: 18 (20 @ 05:35) (18 - 18)  SpO2: 96% (20 @ 05:35) (96% - 98%)  Wt(kg): --  I&O's Summary    2020 07:01  -  2020 06:14  --------------------------------------------------------  IN: 720 mL / OUT: 2150 mL / NET: -1430 mL      CAPILLARY BLOOD GLUCOSE          MEDICATIONS  (STANDING):  ascorbic acid 500 milliGRAM(s) Oral daily  ferrous    sulfate 325 milliGRAM(s) Oral daily  heparin   Injectable 5000 Unit(s) SubCutaneous every 12 hours  influenza   Vaccine 0.5 milliLiter(s) IntraMuscular once  lactated ringers. 500 milliLiter(s) (125 mL/Hr) IV Continuous <Continuous>  piperacillin/tazobactam IVPB.. 3.375 Gram(s) IV Intermittent every 8 hours  senna 2 Tablet(s) Oral at bedtime  simethicone 80 milliGRAM(s) Chew every 6 hours  traMADol 25 milliGRAM(s) Oral once  vancomycin  IVPB      vancomycin  IVPB 1000 milliGRAM(s) IV Intermittent every 12 hours    MEDICATIONS  (PRN):  acetaminophen   Tablet .. 975 milliGRAM(s) Oral every 6 hours PRN Temp greater or equal to 38C (100.4F), Mild Pain (1 - 3)  ibuprofen  Tablet. 600 milliGRAM(s) Oral every 6 hours PRN Moderate Pain (4 - 6)      Physical Exam:  Constitutional: NAD, A+O x3  CV: RRR  Lungs: clear to auscultation bilaterally  Abdomen: soft,+ bowel sounds, appropriately tender, softly distended, no rebound or guarding  Incision: clean, dry, intact, vertical with steris   Extremities: no lower extremity edema or calf tenderness bilaterally, venodynes in place    Labs:                        10.1   12.90 )-----------( 260      ( 2020 00:19 )             30.5                         10.4   12.71 )-----------( 252      ( 2020 11:10 )             30.8         138    |  104    |  6<L>   ----------------------------<  104<H>  4.0     |  22     |  0.60     Ca    9.6        2020 23:25    TPro  6.5    /  Alb  3.7    /  TBili  0.6    /  DBili  x      /  AST  103<H>  /  ALT  88<H>  /  AlkPhos  135<H>  11        PT/INR - ( 2020 12:55 )   PT: 12.1 sec;   INR: 1.02 ratio         PTT - ( 2020 12:55 )  PTT:28.9 sec  Urinalysis Basic - ( 2020 23:52 )    Color: Yellow / Appearance: Clear / S.019 / pH: x  Gluc: x / Ketone: Trace  / Bili: Negative / Urobili: Negative   Blood: x / Protein: Negative / Nitrite: Negative   Leuk Esterase: Negative / RBC: x / WBC x   Sq Epi: x / Non Sq Epi: x / Bacteria: x

## 2020-11-29 NOTE — PROGRESS NOTE ADULT - ATTENDING COMMENTS
Patient seen today, explained plan of care appears well, will Call ID for reccs for post operative fevers

## 2020-11-30 LAB
ALBUMIN SERPL ELPH-MCNC: 3.1 G/DL — LOW (ref 3.3–5)
ALP SERPL-CCNC: 113 U/L — SIGNIFICANT CHANGE UP (ref 40–120)
ALT FLD-CCNC: 32 U/L — SIGNIFICANT CHANGE UP (ref 10–45)
ANION GAP SERPL CALC-SCNC: 11 MMOL/L — SIGNIFICANT CHANGE UP (ref 5–17)
APTT BLD: 30.4 SEC — SIGNIFICANT CHANGE UP (ref 27.5–35.5)
AST SERPL-CCNC: 22 U/L — SIGNIFICANT CHANGE UP (ref 10–40)
BASOPHILS # BLD AUTO: 0.02 K/UL — SIGNIFICANT CHANGE UP (ref 0–0.2)
BASOPHILS NFR BLD AUTO: 0.2 % — SIGNIFICANT CHANGE UP (ref 0–2)
BILIRUB SERPL-MCNC: 0.8 MG/DL — SIGNIFICANT CHANGE UP (ref 0.2–1.2)
BLD GP AB SCN SERPL QL: NEGATIVE — SIGNIFICANT CHANGE UP
BUN SERPL-MCNC: 6 MG/DL — LOW (ref 7–23)
CALCIUM SERPL-MCNC: 9 MG/DL — SIGNIFICANT CHANGE UP (ref 8.4–10.5)
CHLORIDE SERPL-SCNC: 103 MMOL/L — SIGNIFICANT CHANGE UP (ref 96–108)
CO2 SERPL-SCNC: 25 MMOL/L — SIGNIFICANT CHANGE UP (ref 22–31)
CREAT SERPL-MCNC: 0.77 MG/DL — SIGNIFICANT CHANGE UP (ref 0.5–1.3)
EOSINOPHIL # BLD AUTO: 0.23 K/UL — SIGNIFICANT CHANGE UP (ref 0–0.5)
EOSINOPHIL NFR BLD AUTO: 2.2 % — SIGNIFICANT CHANGE UP (ref 0–6)
GLUCOSE SERPL-MCNC: 86 MG/DL — SIGNIFICANT CHANGE UP (ref 70–99)
HCT VFR BLD CALC: 31.1 % — LOW (ref 34.5–45)
HGB BLD-MCNC: 10 G/DL — LOW (ref 11.5–15.5)
HIV 1+2 AB+HIV1 P24 AG SERPL QL IA: SIGNIFICANT CHANGE UP
IMM GRANULOCYTES NFR BLD AUTO: 1.7 % — HIGH (ref 0–1.5)
INR BLD: 1.01 RATIO — SIGNIFICANT CHANGE UP (ref 0.88–1.16)
LYMPHOCYTES # BLD AUTO: 1.06 K/UL — SIGNIFICANT CHANGE UP (ref 1–3.3)
LYMPHOCYTES # BLD AUTO: 10.3 % — LOW (ref 13–44)
MCHC RBC-ENTMCNC: 30 PG — SIGNIFICANT CHANGE UP (ref 27–34)
MCHC RBC-ENTMCNC: 32.2 GM/DL — SIGNIFICANT CHANGE UP (ref 32–36)
MCV RBC AUTO: 93.4 FL — SIGNIFICANT CHANGE UP (ref 80–100)
MONOCYTES # BLD AUTO: 1.38 K/UL — HIGH (ref 0–0.9)
MONOCYTES NFR BLD AUTO: 13.4 % — SIGNIFICANT CHANGE UP (ref 2–14)
NEUTROPHILS # BLD AUTO: 7.43 K/UL — HIGH (ref 1.8–7.4)
NEUTROPHILS NFR BLD AUTO: 72.2 % — SIGNIFICANT CHANGE UP (ref 43–77)
NRBC # BLD: 0 /100 WBCS — SIGNIFICANT CHANGE UP (ref 0–0)
PLATELET # BLD AUTO: 347 K/UL — SIGNIFICANT CHANGE UP (ref 150–400)
POTASSIUM SERPL-MCNC: 3.6 MMOL/L — SIGNIFICANT CHANGE UP (ref 3.5–5.3)
POTASSIUM SERPL-SCNC: 3.6 MMOL/L — SIGNIFICANT CHANGE UP (ref 3.5–5.3)
PROT SERPL-MCNC: 6 G/DL — SIGNIFICANT CHANGE UP (ref 6–8.3)
PROTHROM AB SERPL-ACNC: 12.1 SEC — SIGNIFICANT CHANGE UP (ref 10.6–13.6)
RBC # BLD: 3.33 M/UL — LOW (ref 3.8–5.2)
RBC # FLD: 14.2 % — SIGNIFICANT CHANGE UP (ref 10.3–14.5)
RH IG SCN BLD-IMP: NEGATIVE — SIGNIFICANT CHANGE UP
SODIUM SERPL-SCNC: 139 MMOL/L — SIGNIFICANT CHANGE UP (ref 135–145)
VANCOMYCIN TROUGH SERPL-MCNC: 10.1 UG/ML — SIGNIFICANT CHANGE UP (ref 10–20)
WBC # BLD: 10.3 K/UL — SIGNIFICANT CHANGE UP (ref 3.8–10.5)
WBC # FLD AUTO: 10.3 K/UL — SIGNIFICANT CHANGE UP (ref 3.8–10.5)

## 2020-11-30 PROCEDURE — 76942 ECHO GUIDE FOR BIOPSY: CPT | Mod: 26

## 2020-11-30 PROCEDURE — 10160 PNXR ASPIR ABSC HMTMA BULLA: CPT

## 2020-11-30 PROCEDURE — 99232 SBSQ HOSP IP/OBS MODERATE 35: CPT

## 2020-11-30 RX ORDER — ACETAMINOPHEN 500 MG
975 TABLET ORAL ONCE
Refills: 0 | Status: COMPLETED | OUTPATIENT
Start: 2020-11-30 | End: 2020-11-30

## 2020-11-30 RX ADMIN — SODIUM CHLORIDE 125 MILLILITER(S): 9 INJECTION, SOLUTION INTRAVENOUS at 06:02

## 2020-11-30 RX ADMIN — SIMETHICONE 80 MILLIGRAM(S): 80 TABLET, CHEWABLE ORAL at 13:41

## 2020-11-30 RX ADMIN — Medication 250 MILLIGRAM(S): at 03:03

## 2020-11-30 RX ADMIN — SIMETHICONE 80 MILLIGRAM(S): 80 TABLET, CHEWABLE ORAL at 22:24

## 2020-11-30 RX ADMIN — HEPARIN SODIUM 5000 UNIT(S): 5000 INJECTION INTRAVENOUS; SUBCUTANEOUS at 10:00

## 2020-11-30 RX ADMIN — Medication 325 MILLIGRAM(S): at 13:41

## 2020-11-30 RX ADMIN — HEPARIN SODIUM 5000 UNIT(S): 5000 INJECTION INTRAVENOUS; SUBCUTANEOUS at 22:24

## 2020-11-30 RX ADMIN — PIPERACILLIN AND TAZOBACTAM 25 GRAM(S): 4; .5 INJECTION, POWDER, LYOPHILIZED, FOR SOLUTION INTRAVENOUS at 16:15

## 2020-11-30 RX ADMIN — Medication 975 MILLIGRAM(S): at 13:42

## 2020-11-30 RX ADMIN — Medication 500 MILLIGRAM(S): at 13:42

## 2020-11-30 RX ADMIN — Medication 975 MILLIGRAM(S): at 14:12

## 2020-11-30 RX ADMIN — PIPERACILLIN AND TAZOBACTAM 25 GRAM(S): 4; .5 INJECTION, POWDER, LYOPHILIZED, FOR SOLUTION INTRAVENOUS at 06:02

## 2020-11-30 RX ADMIN — Medication 250 MILLIGRAM(S): at 13:42

## 2020-11-30 NOTE — PROGRESS NOTE ADULT - ASSESSMENT
31y/o F now POD#7 s/p abdominal myomectomy with known postoperative deemed stable hematoma admitted with postoperative fever. CTAP obtained in ED demonstrative of stable hematoma, inability to r/o infected hematoma given extensive fat stranding.  Patient noted to be persistently tachycardic and febrile while on Zosyn, Van added (11/29) with last documented fever to 39.3 (11/29 @ 17:15).

## 2020-11-30 NOTE — PROGRESS NOTE ADULT - PROBLEM SELECTOR PLAN 1
#Post-operative Fever    Plan:   CV: cont to monitor tachycardia- likely result of infectious process, CTA on prior admission r/o PE, dopplers 11/28 r/o DVT  Resp: cont Incentive spirometry  GI: NPO since midnight for possible IR drainage   : voiding without difficulty  ID: on Vanc/zosyn (11/28 - ). ID following. Continue to trend LFTs. F/u HIV screen and blood pending from 11/28. UCx (11/28): Nl jose. NPO pending final MRI read with possible IR drainage.  DVT: cont HSQ for DVT ppx.     Sybil Han, PGY-1

## 2020-11-30 NOTE — PROGRESS NOTE ADULT - ASSESSMENT
30 f s/p myomectomy 11/23 c/b large pelvic hematoma, discharged 11/26 and came back 11/27 with fever, chills, diaphoresis  here persistently febrile, WBC: 12.9  blood and urine cx negative  abd/pelvis CT: No significant change in large pelvic hematoma displacing the uterus left of midline. No new site of bleeding or progressive mass effect. Superinfected hematoma cannot be excluded given extensive surrounding fat stranding and blood products.    fever, leukocytosis, sepsis after myomectomy with large pelvic hematoma next to the uterus wall defect, likely infected     * pelvic MRI with Hematoma in the pelvis immediately adjacent to the uterine defects measuring 13.5 x 12.3 x 9.6 cm. No large amount of air within the collection  * f/u with IR for drainage and send for culture  * c/w zosyn 3.375 q 8 and vanco 1 q 12 for now  * monitor the vanco trough and renal function to prevent nephrotoxicity    The above assessment and plan was discussed with the primary team    Mare Dominguez MD  Pager [phone]  After 5pm and on weekends call [phone]

## 2020-11-30 NOTE — PROGRESS NOTE ADULT - ATTENDING COMMENTS
pt seen and examined at 10am. only uncomfortable during fevers. c/o chills at that time. comfortable otherwise. tramadol prn. final read pending at time of eval. discussion with IR and plan for sampling for cx and can tailor abx. d/w pt and she voiced understanding. per ID for now continue denita/iggy Renee

## 2020-11-30 NOTE — PROGRESS NOTE ADULT - SUBJECTIVE AND OBJECTIVE BOX
GYN Progress Note    POD#7   HD#3    Patient seen and examined at bedside by GYN team.  No acute events overnight. No acute concerns.  Pain well controlled. NPO since midnight.  Patient is ambulating and tolerating regular diet yesterday.  Patient is passing flatus.   Patient is voiding spontaneously.   Denies CP, SOB, N/V, fevers, and chills.    Vital Signs Last 24 Hours  T(C): 36.7 (11-30-20 @ 00:52), Max: 39.3 (11-29-20 @ 17:15)  HR: 91 (11-30-20 @ 00:52) (91 - 109)  BP: 119/83 (11-30-20 @ 00:52) (118/82 - 138/87)  RR: 18 (11-30-20 @ 00:52) (17 - 18)  SpO2: 96% (11-30-20 @ 00:52) (95% - 98%)    I&O's Summary    28 Nov 2020 07:01  -  29 Nov 2020 07:00  --------------------------------------------------------  IN: 720 mL / OUT: 2150 mL / NET: -1430 mL    29 Nov 2020 07:01  -  30 Nov 2020 06:18  --------------------------------------------------------  IN: 1855 mL / OUT: 2650 mL / NET: -795 mL        Physical Exam:  General: NAD  CV: RRR  Lungs: CTAB  Abdomen: soft, appropriately-tender, non-distended, normoactive bowel sounds  Incision: midline transverse incision CDI, no redness surrounding   : no bleeding on pad  Ext: no pain or swelling     Labs:                        10.3   12.95 )-----------( 289      ( 29 Nov 2020 07:17 )             30.8   baso 0.2    eos 0.4    imm gran 1.1    lymph 10.3   mono 12.5   poly 75.5                         10.1   12.90 )-----------( 260      ( 29 Nov 2020 00:19 )             30.5   baso 0.1    eos 0.5    imm gran 0.9    lymph 10.2   mono 11.4   poly 76.9                         10.4   12.71 )-----------( 252      ( 28 Nov 2020 11:10 )             30.8   baso 0.2    eos 0.2    imm gran 0.6    lymph 8.7    mono 11.3   poly 79.0                         11.2   11.32 )-----------( 252      ( 27 Nov 2020 23:25 )             34.0   baso 0.3    eos 2.4    imm gran 1.1    lymph 13.7   mono 11.0   poly 71.5                10.3<L>  12.95<H> )-----------( 289      ( 11-29 @ 07:17 )             30.8<L>               10.1<L>  12.90<H> )-----------( 260      ( 11-29 @ 00:19 )             30.5<L>               10.4<L>  12.71<H> )-----------( 252      ( 11-28 @ 11:10 )             30.8<L>               11.2<L>  11.32<H> )-----------( 252      ( 11-27 @ 23:25 )             34.0<L>               9.7<L>  9.13  )-----------( 198      ( 11-26 @ 17:17 )             27.9<L>            PT/INR - ( 28 Nov 2020 12:55 )   PT: 12.1 sec;   INR: 1.02 ratio         PTT - ( 28 Nov 2020 12:55 )  PTT:28.9 sec          MEDICATIONS  (STANDING):  ascorbic acid 500 milliGRAM(s) Oral daily  ferrous    sulfate 325 milliGRAM(s) Oral daily  heparin   Injectable 5000 Unit(s) SubCutaneous every 12 hours  influenza   Vaccine 0.5 milliLiter(s) IntraMuscular once  lactated ringers. 500 milliLiter(s) (125 mL/Hr) IV Continuous <Continuous>  piperacillin/tazobactam IVPB.. 3.375 Gram(s) IV Intermittent every 8 hours  senna 2 Tablet(s) Oral at bedtime  simethicone 80 milliGRAM(s) Chew every 6 hours  vancomycin  IVPB      vancomycin  IVPB 1000 milliGRAM(s) IV Intermittent every 12 hours    MEDICATIONS  (PRN):  ibuprofen  Tablet. 600 milliGRAM(s) Oral every 6 hours PRN Moderate Pain (4 - 6)  traMADol 25 milliGRAM(s) Oral every 6 hours PRN Severe Pain (7 - 10)

## 2020-11-30 NOTE — PROGRESS NOTE ADULT - SUBJECTIVE AND OBJECTIVE BOX
Follow Up:  fever, pelvic hematoma, ?abscess    Interval History: MRI showed a large hematoma next to the uterus wall defect    ROS:      All other systems negative    Constitutional: + fever  Cardiovascular:  no chest pain, no palpitation  Respiratory:  no SOB, no cough  GI:  no abd pain, no vomiting, no diarrhea  urinary: no dysuria, no hematuria, no flank pain  musculoskeletal:  no joint pain, no joint swelling  skin:  no rash  neurology:  no headache, no seizure    Allergies  Ceclor (Rash)        ANTIMICROBIALS:  piperacillin/tazobactam IVPB.. 3.375 every 8 hours  vancomycin  IVPB    vancomycin  IVPB 1000 every 12 hours      OTHER MEDS:  ascorbic acid 500 milliGRAM(s) Oral daily  ferrous    sulfate 325 milliGRAM(s) Oral daily  heparin   Injectable 5000 Unit(s) SubCutaneous every 12 hours  ibuprofen  Tablet. 600 milliGRAM(s) Oral every 6 hours PRN  influenza   Vaccine 0.5 milliLiter(s) IntraMuscular once  lactated ringers. 500 milliLiter(s) IV Continuous <Continuous>  senna 2 Tablet(s) Oral at bedtime  simethicone 80 milliGRAM(s) Chew every 6 hours  traMADol 25 milliGRAM(s) Oral every 6 hours PRN      Vital Signs Last 24 Hrs  T(C): 37.1 (30 Nov 2020 16:09), Max: 39.3 (29 Nov 2020 17:15)  T(F): 98.8 (30 Nov 2020 16:09), Max: 102.8 (29 Nov 2020 17:15)  HR: 99 (30 Nov 2020 16:09) (91 - 109)  BP: 120/74 (30 Nov 2020 16:09) (110/68 - 138/87)  BP(mean): --  RR: 18 (30 Nov 2020 16:09) (17 - 20)  SpO2: 95% (30 Nov 2020 16:09) (95% - 97%)    Physical Exam:  General:    NAD,  non toxic  Cardio:     regular S1, S2,  no murmur  Respiratory:    clear b/l,    no wheezing  abd:     soft,   BS +,   no tenderness  :   no CVAT,  no suprapubic tenderness,   no  lovett  Musculoskeletal:   no joint swelling  vascular: no phlebitis  Skin:    no rash                          10.0   10.30 )-----------( 347      ( 30 Nov 2020 07:16 )             31.1       11-30    139  |  103  |  6<L>  ----------------------------<  86  3.6   |  25  |  0.77    Ca    9.0      30 Nov 2020 07:17    TPro  6.0  /  Alb  3.1<L>  /  TBili  0.8  /  DBili  x   /  AST  22  /  ALT  32  /  AlkPhos  113  11-30          MICROBIOLOGY:  v  .Urine Clean Catch (Midstream)  11-28-20   <10,000 CFU/mL Normal Urogenital Elvira  --  --      .Blood Blood-Peripheral  11-28-20   No growth to date.  --  --                RADIOLOGY:  Images independently visualized and reviewed personally, findings as below  < from: MR Abdomen w/wo IV Cont (11.29.20 @ 23:17) >    UTERUS: 13.8 x 4.0 x 5.7 cm. Endometrium and junctional zone are normal in thickness. Intrauterine device in good position. Status post myomectomy with a postsurgical defect in the right lateral wall of the uterus. Hematoma in the pelvis immediately adjacent to the uterine defects measuring 13.5 x 12.3 x 9.6 cm. No large amount of air within the collection.    LEFT OVARY: Normal in appearance.  RIGHT OVARY: Normal in appearance.    BOWEL: No bowel obstruction.  PERITONEUM: Trace upper abdominal ascites. Large pelvic hematoma as above.  VESSELS: Within normal limits.  RETROPERITONEUM/LYMPH NODES: No lymphadenopathy.  ABDOMINAL WALL: Postsurgical changes.  BONES: Within normal limits.    IMPRESSION:  Status post myomectomy with a postsurgical defect in the right lateral wall of the uterus.    Large pelvic hematoma as above.

## 2020-11-30 NOTE — CHART NOTE - NSCHARTNOTEFT_GEN_A_CORE
IR Pre-Procedure Note    Patient Age: 30y    Patient Gender: Female    Procedure (including site / side if known): Pelvic Hematoma drainage    Diagnosis / Indication: POD7 s/p Abdominal Myomectomy, readmitted with fevers in the setting of known pelvic hematoma. Patient not improving on broad spectrum abx. Plan for IR drainage to obtain culture to assist in narrowing abx    Interventional Radiology Attending Physician: Dr. Vinson    Ordering Attending Physician: Dr. Vicky Renee    Pertinent medical history: Fibroids, Erythrocytosis    Pertinent labs:               10.0   10.30 )-----------( 347      ( 11-30 @ 07:16 )             31.1       11-30 @ 07:17    139  |  103  |  6   ----------------------------<  86  3.6   |  25  |  0.77    Ca    9.0      11-30 @ 07:17    TPro  6.0  /  Alb  3.1  /  TBili  0.8  /  DBili  x   /  AST  22  /  ALT  32  /  AlkPhos  113  11-30 @ 07:17    PT/INR - ( 11-30 @ 07:18 )   PT: 12.1 sec;   INR: 1.01 ratio    PTT - ( 11-30 @ 07:18 )  PTT:30.4 sec      Patient and Family aware: Yes    RFrankel PGY2

## 2020-12-01 ENCOUNTER — TRANSCRIPTION ENCOUNTER (OUTPATIENT)
Age: 30
End: 2020-12-01

## 2020-12-01 LAB
ANION GAP SERPL CALC-SCNC: 12 MMOL/L — SIGNIFICANT CHANGE UP (ref 5–17)
BASOPHILS # BLD AUTO: 0.04 K/UL — SIGNIFICANT CHANGE UP (ref 0–0.2)
BASOPHILS NFR BLD AUTO: 0.5 % — SIGNIFICANT CHANGE UP (ref 0–2)
BUN SERPL-MCNC: 6 MG/DL — LOW (ref 7–23)
CALCIUM SERPL-MCNC: 9.2 MG/DL — SIGNIFICANT CHANGE UP (ref 8.4–10.5)
CHLORIDE SERPL-SCNC: 104 MMOL/L — SIGNIFICANT CHANGE UP (ref 96–108)
CO2 SERPL-SCNC: 24 MMOL/L — SIGNIFICANT CHANGE UP (ref 22–31)
CREAT SERPL-MCNC: 0.92 MG/DL — SIGNIFICANT CHANGE UP (ref 0.5–1.3)
EOSINOPHIL # BLD AUTO: 0.22 K/UL — SIGNIFICANT CHANGE UP (ref 0–0.5)
EOSINOPHIL NFR BLD AUTO: 2.8 % — SIGNIFICANT CHANGE UP (ref 0–6)
GLUCOSE SERPL-MCNC: 106 MG/DL — HIGH (ref 70–99)
GRAM STN FLD: SIGNIFICANT CHANGE UP
HCT VFR BLD CALC: 30.5 % — LOW (ref 34.5–45)
HGB BLD-MCNC: 10 G/DL — LOW (ref 11.5–15.5)
IMM GRANULOCYTES NFR BLD AUTO: 1 % — SIGNIFICANT CHANGE UP (ref 0–1.5)
LYMPHOCYTES # BLD AUTO: 1.44 K/UL — SIGNIFICANT CHANGE UP (ref 1–3.3)
LYMPHOCYTES # BLD AUTO: 18.4 % — SIGNIFICANT CHANGE UP (ref 13–44)
MCHC RBC-ENTMCNC: 30.2 PG — SIGNIFICANT CHANGE UP (ref 27–34)
MCHC RBC-ENTMCNC: 32.8 GM/DL — SIGNIFICANT CHANGE UP (ref 32–36)
MCV RBC AUTO: 92.1 FL — SIGNIFICANT CHANGE UP (ref 80–100)
MONOCYTES # BLD AUTO: 0.99 K/UL — HIGH (ref 0–0.9)
MONOCYTES NFR BLD AUTO: 12.6 % — SIGNIFICANT CHANGE UP (ref 2–14)
NEUTROPHILS # BLD AUTO: 5.07 K/UL — SIGNIFICANT CHANGE UP (ref 1.8–7.4)
NEUTROPHILS NFR BLD AUTO: 64.7 % — SIGNIFICANT CHANGE UP (ref 43–77)
NRBC # BLD: 0 /100 WBCS — SIGNIFICANT CHANGE UP (ref 0–0)
PLATELET # BLD AUTO: 398 K/UL — SIGNIFICANT CHANGE UP (ref 150–400)
POTASSIUM SERPL-MCNC: 3.6 MMOL/L — SIGNIFICANT CHANGE UP (ref 3.5–5.3)
POTASSIUM SERPL-SCNC: 3.6 MMOL/L — SIGNIFICANT CHANGE UP (ref 3.5–5.3)
RBC # BLD: 3.31 M/UL — LOW (ref 3.8–5.2)
RBC # FLD: 14.1 % — SIGNIFICANT CHANGE UP (ref 10.3–14.5)
SODIUM SERPL-SCNC: 140 MMOL/L — SIGNIFICANT CHANGE UP (ref 135–145)
SPECIMEN SOURCE: SIGNIFICANT CHANGE UP
VANCOMYCIN TROUGH SERPL-MCNC: 13.1 UG/ML — SIGNIFICANT CHANGE UP (ref 10–20)
WBC # BLD: 7.84 K/UL — SIGNIFICANT CHANGE UP (ref 3.8–10.5)
WBC # FLD AUTO: 7.84 K/UL — SIGNIFICANT CHANGE UP (ref 3.8–10.5)

## 2020-12-01 PROCEDURE — 99232 SBSQ HOSP IP/OBS MODERATE 35: CPT

## 2020-12-01 RX ORDER — VANCOMYCIN HCL 1 G
1250 VIAL (EA) INTRAVENOUS EVERY 12 HOURS
Refills: 0 | Status: DISCONTINUED | OUTPATIENT
Start: 2020-12-01 | End: 2020-12-01

## 2020-12-01 RX ORDER — VANCOMYCIN HCL 1 G
VIAL (EA) INTRAVENOUS
Refills: 0 | Status: DISCONTINUED | OUTPATIENT
Start: 2020-12-01 | End: 2020-12-01

## 2020-12-01 RX ORDER — VANCOMYCIN HCL 1 G
1000 VIAL (EA) INTRAVENOUS EVERY 12 HOURS
Refills: 0 | Status: DISCONTINUED | OUTPATIENT
Start: 2020-12-01 | End: 2020-12-02

## 2020-12-01 RX ORDER — ACETAMINOPHEN 500 MG
975 TABLET ORAL ONCE
Refills: 0 | Status: COMPLETED | OUTPATIENT
Start: 2020-12-01 | End: 2020-12-01

## 2020-12-01 RX ADMIN — Medication 975 MILLIGRAM(S): at 16:00

## 2020-12-01 RX ADMIN — Medication 250 MILLIGRAM(S): at 17:48

## 2020-12-01 RX ADMIN — Medication 600 MILLIGRAM(S): at 00:26

## 2020-12-01 RX ADMIN — HEPARIN SODIUM 5000 UNIT(S): 5000 INJECTION INTRAVENOUS; SUBCUTANEOUS at 09:33

## 2020-12-01 RX ADMIN — HEPARIN SODIUM 5000 UNIT(S): 5000 INJECTION INTRAVENOUS; SUBCUTANEOUS at 22:36

## 2020-12-01 RX ADMIN — PIPERACILLIN AND TAZOBACTAM 25 GRAM(S): 4; .5 INJECTION, POWDER, LYOPHILIZED, FOR SOLUTION INTRAVENOUS at 08:56

## 2020-12-01 RX ADMIN — Medication 975 MILLIGRAM(S): at 15:12

## 2020-12-01 RX ADMIN — Medication 166.67 MILLIGRAM(S): at 02:49

## 2020-12-01 RX ADMIN — PIPERACILLIN AND TAZOBACTAM 25 GRAM(S): 4; .5 INJECTION, POWDER, LYOPHILIZED, FOR SOLUTION INTRAVENOUS at 00:46

## 2020-12-01 RX ADMIN — Medication 500 MILLIGRAM(S): at 12:18

## 2020-12-01 RX ADMIN — PIPERACILLIN AND TAZOBACTAM 25 GRAM(S): 4; .5 INJECTION, POWDER, LYOPHILIZED, FOR SOLUTION INTRAVENOUS at 16:26

## 2020-12-01 RX ADMIN — Medication 600 MILLIGRAM(S): at 00:56

## 2020-12-01 RX ADMIN — SIMETHICONE 80 MILLIGRAM(S): 80 TABLET, CHEWABLE ORAL at 05:18

## 2020-12-01 RX ADMIN — Medication 325 MILLIGRAM(S): at 12:18

## 2020-12-01 NOTE — PROGRESS NOTE ADULT - PROBLEM SELECTOR PLAN 1
#Post-operative Fever    Plan:   CV: cont to monitor tachycardia, improving - likely result of infectious process, CTA on prior admission r/o PE, dopplers 11/28 r/o DVT  Resp: cont IS  GI: Reg diet  : voiding without difficulty  ID: on Vanc/zosyn (11/28 - ). Increased Vanc 2/2 low trough, would appreciate ID recs on dosing. ID following. Continue to trend LFTs. HIV neg. F/u final BCx (11/28) and Drain fluid Cx (11/30) and blood pending from 11/28. UCx (11/28): Nl jose.   DVT: cont HSQ for DVT ppx.     Sybil Han, PGY-1

## 2020-12-01 NOTE — DISCHARGE NOTE PROVIDER - PROVIDER TOKENS
PROVIDER:[TOKEN:[[id number]]] PROVIDER:[TOKEN:[[id number]]],PROVIDER:[TOKEN:[[id number]],FOLLOWUP:[2 weeks]]

## 2020-12-01 NOTE — PROGRESS NOTE ADULT - ATTENDING COMMENTS
Patient seen and examined, agree with above. S/p IR yesterday to get cultures for possible infected hematoma. Pt currently with normal WBC, afebrile for almost 24 hours. Will continue to monitor fever curve and continue with Vanc/Zosyn, appreciate ID recs. Will f/u drainage cultures to tailor abx as needed.    Domingo Middleton MD Patient seen and examined, agree with above. S/p IR yesterday to get cultures for possible infected hematoma. Pt currently with normal WBC, afebrile for almost 24 hours. Will continue to monitor fever curve and continue with Vanc/Zosyn, appreciate ID recs. Will f/u drainage cultures to tailor abx as needed.    Domingo Middleton MD    GYN Attending Note  pt seen and examined. had another temp this afternoon to 38.1. cx still pending. per ID will maintain abx. Cr slightly increased to 0.9 today. Will continue to monitor and follow ID recs. pt continues to feel well overall. doing well otherwise from postop perspective.  at this time, continue current management.    ANT Renee

## 2020-12-01 NOTE — DISCHARGE NOTE PROVIDER - CARE PROVIDERS DIRECT ADDRESSES
[email or web address]

## 2020-12-01 NOTE — DISCHARGE NOTE PROVIDER - CARE PROVIDER_API CALL
Vicky Renee  OBSTETRICS AND GYNECOLOGY  [address] [place] [address]  Phone: [phone]  Fax: [phone]  Follow Up Time:    Vicky Renee  OBSTETRICS AND GYNECOLOGY  [address] [place] [id number]  Phone: [phone]  Fax: [phone]  Follow Up Time:     Mare Dominguez  INTERNAL MEDICINE  [address] [facility], [place] [address]  Phone: [phone]  Fax: [phone]  Follow Up Time: 2 weeks

## 2020-12-01 NOTE — DISCHARGE NOTE PROVIDER - NSDCMRMEDTOKEN_GEN_ALL_CORE_FT
acetaminophen 325 mg oral tablet: 3 tab(s) orally every 6 hours  Bactrim  mg-160 mg oral tablet: 1 tab(s) orally every 12 hours   Colace 100 mg oral capsule: 1 cap(s) orally once a day, As Needed   ferrous sulfate 325 mg (65 mg elemental iron) oral delayed release tablet: 1 tab(s) orally 3 times a day   ibuprofen 600 mg oral tablet: 1 tab(s) orally every 6 hours  oxyCODONE 5 mg oral tablet: 1 tab(s) orally every 6 hours MDD:4 tablets   acetaminophen 325 mg oral tablet: 3 tab(s) orally every 6 hours  amoxicillin-clavulanate 875 mg-125 mg oral tablet: 1 tab(s) orally 2 times a day  ascorbic acid 500 mg oral tablet: 1 tab(s) orally once a day  ferrous sulfate 325 mg (65 mg elemental iron) oral delayed release tablet: 1 tab(s) orally 3 times a day   ibuprofen 600 mg oral tablet: 1 tab(s) orally every 6 hours

## 2020-12-01 NOTE — DISCHARGE NOTE PROVIDER - NSDCFUADDINST_GEN_ALL_CORE_FT
Postoperative Instructions  Pain control     For pain control, take the followin. Motrin 600mg four times a day, take with food  2. Add Tylenol 975 four times a day, alternated with motrin  3. Add oxycodone as needed for severe pain not managed well by motrin and tylenol. A prescription has been sent to your pharmacy on file.     Motrin and Tylenol can be obtained over the counter.        Postoperative restrictions   Nothing in the vagina (tampons, sexual intercourse), No tub baths, pools or hot tubs for 6 weeks (showers are ok!). No lifting anything heavier than 15 lbs, no strenuous exercise for 6 weeks after surgery. Do not pull or cut any stitches that you see around your incision.   Signs of Infection  Some postoperative pain and discomfort is normal. However, if you experience any of the following, you may be developing an infection and should be seen by your doctor: pain that does not get better with the oral medications listed above, fever greater than 100.4F, foul smelling discharge coming from the surgical site, nausea and vomiting that does not stop (especially if you are unable to tolerate oral intake), or inability to urinate. If you experience any of these symptoms, call your doctor's office to be seen right away.    Follow Up  Call Dr. Renee's office to schedule a followup appointment in *** weeks.

## 2020-12-01 NOTE — PROGRESS NOTE ADULT - SUBJECTIVE AND OBJECTIVE BOX
Follow Up:  fever, pelvic hematoma, ?abscess    Interval History: s/p IR drainage of the collection but no drain was placed, no more fevers today    ROS:      All other systems negative    Constitutional: no more fever  Cardiovascular:  no chest pain, no palpitation  Respiratory:  no SOB, no cough  GI:  no abd pain, no vomiting, no diarrhea  urinary: no dysuria, no hematuria, no flank pain  musculoskeletal:  no joint pain, no joint swelling  skin:  no rash  neurology:  no headache, no seizure        Allergies  Ceclor (Rash)        ANTIMICROBIALS:  piperacillin/tazobactam IVPB.. 3.375 every 8 hours  vancomycin  IVPB 1250 every 12 hours      OTHER MEDS:  ascorbic acid 500 milliGRAM(s) Oral daily  ferrous    sulfate 325 milliGRAM(s) Oral daily  heparin   Injectable 5000 Unit(s) SubCutaneous every 12 hours  ibuprofen  Tablet. 600 milliGRAM(s) Oral every 6 hours PRN  influenza   Vaccine 0.5 milliLiter(s) IntraMuscular once  lactated ringers. 500 milliLiter(s) IV Continuous <Continuous>  senna 2 Tablet(s) Oral at bedtime  simethicone 80 milliGRAM(s) Chew every 6 hours  traMADol 25 milliGRAM(s) Oral every 6 hours PRN      Vital Signs Last 24 Hrs  T(C): 37 (01 Dec 2020 09:08), Max: 38.4 (30 Nov 2020 13:36)  T(F): 98.6 (01 Dec 2020 09:08), Max: 101.2 (30 Nov 2020 13:36)  HR: 88 (01 Dec 2020 09:08) (83 - 108)  BP: 123/80 (01 Dec 2020 09:08) (110/68 - 130/86)  BP(mean): --  RR: 18 (01 Dec 2020 09:08) (18 - 20)  SpO2: 97% (01 Dec 2020 09:08) (95% - 97%)    Physical Exam:  General:    NAD,  non toxic  Cardio:     regular S1, S2  Respiratory:   comfortable on RA  abd:     soft,   BS +,   no tenderness  :   no CVAT,  no suprapubic tenderness,   no  lovett  Musculoskeletal:   no joint swelling  vascular: no phlebitis  Skin:    no rash                          10.0   7.84  )-----------( 398      ( 01 Dec 2020 07:08 )             30.5       12-01    140  |  104  |  6<L>  ----------------------------<  106<H>  3.6   |  24  |  0.92    Ca    9.2      01 Dec 2020 07:07    TPro  6.0  /  Alb  3.1<L>  /  TBili  0.8  /  DBili  x   /  AST  22  /  ALT  32  /  AlkPhos  113  11-30          MICROBIOLOGY:  Vancomycin Level, Trough: 10.1 ug/mL (11-30-20 @ 22:46)  v  .Body Fluid hematoma  12-01-20 --  --    No polymorphonuclear cells seen per low power field  No organisms seen per oil power field      .Urine Clean Catch (Midstream)  11-28-20   <10,000 CFU/mL Normal Urogenital Elvira  --  --      .Blood Blood-Peripheral  11-28-20   No growth to date.  --  --                RADIOLOGY:  Images independently visualized and reviewed personally, findings as below  < from: IR Procedure (11.30.20 @ 15:35) >  Fluid collection aspiration  The patient was positioned supine. Initial imaging was performed. Local anesthesia was administered. The fluid collection was accessed using an access needle. Position within the fluid collection was confirmed, and fluid aspiration was performed. All instruments were then removed.  - Initial imaging findings: Moderate sized pelvic collection with complex material likely representing hematoma  - Aspiration needle/catheter: 5 Yoruba Yueh centesis needle  - Post-aspiration imaging findings: No significant change

## 2020-12-01 NOTE — PROGRESS NOTE ADULT - SUBJECTIVE AND OBJECTIVE BOX
GYN Progress Note    POD#8   HD#4    Patient seen and examined at bedside by GYN team.  No acute events overnight. Pt notes night sweats disturbing her sleep overnight.  Pain well controlled.  Patient is ambulating and tolerating reg diet.  Patient is passing flatus.   Patient is voiding spontaneously   Denies CP, SOB, N/V, and chills.    Vital Signs Last 24 Hours  T(C): 36.8 (12-01-20 @ 04:15), Max: 38.4 (11-30-20 @ 13:36)  HR: 86 (12-01-20 @ 04:15) (86 - 108)  BP: 114/83 (12-01-20 @ 04:15) (110/68 - 130/86)  RR: 18 (12-01-20 @ 04:15) (18 - 20)  SpO2: 95% (12-01-20 @ 04:15) (95% - 97%)    I&O's Summary    29 Nov 2020 07:01  -  30 Nov 2020 07:00  --------------------------------------------------------  IN: 1855 mL / OUT: 2650 mL / NET: -795 mL    30 Nov 2020 07:01  -  01 Dec 2020 06:18  --------------------------------------------------------  IN: 1662 mL / OUT: 1850 mL / NET: -188 mL        Physical Exam:  General: NAD  CV: RRR  Lungs: CTAB  Abdomen: soft, appropriately-tender, softly distended, normoactive bowel sounds  Incision: midline vertical CDI  : no bleeding on pad  Ext: no pain or swelling     Labs:                        10.0   10.30 )-----------( 347      ( 30 Nov 2020 07:16 )             31.1   baso 0.2    eos 2.2    imm gran 1.7    lymph 10.3   mono 13.4   poly 72.2                         10.3   12.95 )-----------( 289      ( 29 Nov 2020 07:17 )             30.8   baso 0.2    eos 0.4    imm gran 1.1    lymph 10.3   mono 12.5   poly 75.5                         10.1   12.90 )-----------( 260      ( 29 Nov 2020 00:19 )             30.5   baso 0.1    eos 0.5    imm gran 0.9    lymph 10.2   mono 11.4   poly 76.9                         10.4   12.71 )-----------( 252      ( 28 Nov 2020 11:10 )             30.8   baso 0.2    eos 0.2    imm gran 0.6    lymph 8.7    mono 11.3   poly 79.0                10.0<L>  10.30 )-----------( 347      ( 11-30 @ 07:16 )             31.1<L>               10.3<L>  12.95<H> )-----------( 289      ( 11-29 @ 07:17 )             30.8<L>               10.1<L>  12.90<H> )-----------( 260      ( 11-29 @ 00:19 )             30.5<L>               10.4<L>  12.71<H> )-----------( 252      ( 11-28 @ 11:10 )             30.8<L>               11.2<L>  11.32<H> )-----------( 252      ( 11-27 @ 23:25 )             34.0<L>      11-30    139  |  103  |  6<L>  ----------------------------<  86  3.6   |  25  |  0.77    Ca    9.0      30 Nov 2020 07:17    TPro  6.0  /  Alb  3.1<L>  /  TBili  0.8  /  DBili  x   /  AST  22  /  ALT  32  /  AlkPhos  113  11-30    PT/INR - ( 30 Nov 2020 07:18 )   PT: 12.1 sec;   INR: 1.01 ratio         PTT - ( 30 Nov 2020 07:18 )  PTT:30.4 sec      ABO Interpretation: A (11-30-20)  Antibody Screen: Negative (11-30-20)      MEDICATIONS  (STANDING):  ascorbic acid 500 milliGRAM(s) Oral daily  ferrous    sulfate 325 milliGRAM(s) Oral daily  heparin   Injectable 5000 Unit(s) SubCutaneous every 12 hours  influenza   Vaccine 0.5 milliLiter(s) IntraMuscular once  lactated ringers. 500 milliLiter(s) (125 mL/Hr) IV Continuous <Continuous>  piperacillin/tazobactam IVPB.. 3.375 Gram(s) IV Intermittent every 8 hours  senna 2 Tablet(s) Oral at bedtime  simethicone 80 milliGRAM(s) Chew every 6 hours  vancomycin  IVPB 1250 milliGRAM(s) IV Intermittent every 12 hours    MEDICATIONS  (PRN):  ibuprofen  Tablet. 600 milliGRAM(s) Oral every 6 hours PRN Moderate Pain (4 - 6)  traMADol 25 milliGRAM(s) Oral every 6 hours PRN Severe Pain (7 - 10)

## 2020-12-01 NOTE — DISCHARGE NOTE PROVIDER - HOSPITAL COURSE
31 y/o F s/p abdominal myomectomy on 11/23 for symptomatic fibroids who was discharged on POD#3 after a hospital course complicated by anemia s/p 4U pRBC and a stable hematoma. CTA prior to discharge showed no PE. On POD#4, the patient came to the ED with persistent fevers concerning for an infected hematoma. She was readmitted and started on Vancomycin and Zosyn (11/28 - ). Lower extremity dopplers showed no DVT. On 11/30, the patient went to IR to have her hematoma drained and cultured.      29 y/o F s/p abdominal myomectomy on 11/23 for symptomatic fibroids who was discharged on POD#3 after a hospital course complicated by anemia s/p 4U pRBC and a stable hematoma. CTA prior to discharge showed no PE. On POD#4, the patient came to the ED with persistent fevers concerning for an infected hematoma. She was readmitted and started on Vancomycin and Zosyn (11/28 - ). Lower extremity dopplers showed no DVT. On 11/30, the patient went to IR to have her hematoma drained and cultured. On POD#10, the patient was transitioned to PO Augmentin.      31 y/o F s/p abdominal myomectomy on 11/23 for symptomatic fibroids who was discharged on POD#3 after a hospital course complicated by anemia s/p 4U pRBC and a stable hematoma. CTA prior to discharge showed no PE. On POD#4, the patient came to the ED with persistent fevers and tachycardia concerning for an infected hematoma. She was readmitted and started on Vancomycin and Zosyn (11/28 - 12/3). Lower extremity dopplers showed no DVT. On 11/30, the patient went to IR to have her hematoma drained and cultured. On HD#6, the patient was transitioned to PO Augmentin which she tolerated well. On day of discharge, the patient was afebrile, tolerating PO, with pain well controlled. She will have close follow up with Dr. Renee outpatient.

## 2020-12-01 NOTE — PROGRESS NOTE ADULT - ASSESSMENT
30 f s/p myomectomy 11/23 c/b large pelvic hematoma, discharged 11/26 and came back 11/27 with fever, chills, diaphoresis  here persistently febrile, WBC: 12.9  blood and urine cx negative  abd/pelvis CT: No significant change in large pelvic hematoma displacing the uterus left of midline. No new site of bleeding or progressive mass effect. Superinfected hematoma cannot be excluded given extensive surrounding fat stranding and blood products.  pelvic MRI with Hematoma in the pelvis immediately adjacent to the uterine defects measuring 13.5 x 12.3 x 9.6 cm. No large amount of air within the collection  s/p IR drainage 11/30 but no drain was placed    fever, leukocytosis, sepsis after myomectomy with large pelvic hematoma next to the uterus wall defect, ? infected   s/p IR drainage 11/30 with no drain  pt was persistently febrile until today    * monitor the  temp curve and WBC  * f/u IR for culture  * c/w zosyn 3.375 q 8 and vanco 1 q 12 for now, started 11/28, now day 4  * monitor the vanco trough and renal function to prevent nephrotoxicity    The above assessment and plan was discussed with GYN    Mare Dominguez MD  Pager [phone]  After 5pm and on weekends call [phone]

## 2020-12-01 NOTE — DISCHARGE NOTE PROVIDER - NSDCCPCAREPLAN_GEN_ALL_CORE_FT
PRINCIPAL DISCHARGE DIAGNOSIS  Diagnosis: Fever, unspecified fever cause  Assessment and Plan of Treatment:

## 2020-12-01 NOTE — PROGRESS NOTE ADULT - ASSESSMENT
29y/o F now POD#8 s/p abdominal myomectomy with known postoperative deemed stable hematoma admitted with postoperative fever. CTAP obtained in ED demonstrative of stable hematoma, inability to r/o infected hematoma given extensive fat stranding.  Patient noted to be persistently tachycardic and febrile while on Zosyn, Van added (11/29) with last documented fever to 38.4 (11/30 @ 13:36).      29y/o F now POD#8 s/p abdominal myomectomy with known postoperative deemed stable hematoma admitted with postoperative fever. CTAP obtained in ED demonstrative of stable hematoma, inability to r/o infected hematoma given extensive fat stranding.  Patient noted to be persistently tachycardic and febrile while on Zosyn, Vancomycin added (11/29) with last documented fever to 38.4 (11/30 @ 13:36).

## 2020-12-02 LAB
ALBUMIN SERPL ELPH-MCNC: 3.2 G/DL — LOW (ref 3.3–5)
ALP SERPL-CCNC: 161 U/L — HIGH (ref 40–120)
ALT FLD-CCNC: 40 U/L — SIGNIFICANT CHANGE UP (ref 10–45)
ANION GAP SERPL CALC-SCNC: 14 MMOL/L — SIGNIFICANT CHANGE UP (ref 5–17)
AST SERPL-CCNC: 33 U/L — SIGNIFICANT CHANGE UP (ref 10–40)
BASOPHILS # BLD AUTO: 0.02 K/UL — SIGNIFICANT CHANGE UP (ref 0–0.2)
BASOPHILS NFR BLD AUTO: 0.3 % — SIGNIFICANT CHANGE UP (ref 0–2)
BILIRUB SERPL-MCNC: 0.5 MG/DL — SIGNIFICANT CHANGE UP (ref 0.2–1.2)
BUN SERPL-MCNC: 8 MG/DL — SIGNIFICANT CHANGE UP (ref 7–23)
CALCIUM SERPL-MCNC: 9.2 MG/DL — SIGNIFICANT CHANGE UP (ref 8.4–10.5)
CHLORIDE SERPL-SCNC: 104 MMOL/L — SIGNIFICANT CHANGE UP (ref 96–108)
CO2 SERPL-SCNC: 23 MMOL/L — SIGNIFICANT CHANGE UP (ref 22–31)
CREAT SERPL-MCNC: 1.24 MG/DL — SIGNIFICANT CHANGE UP (ref 0.5–1.3)
EOSINOPHIL # BLD AUTO: 0.21 K/UL — SIGNIFICANT CHANGE UP (ref 0–0.5)
EOSINOPHIL NFR BLD AUTO: 2.7 % — SIGNIFICANT CHANGE UP (ref 0–6)
GLUCOSE SERPL-MCNC: 103 MG/DL — HIGH (ref 70–99)
HCT VFR BLD CALC: 30.8 % — LOW (ref 34.5–45)
HGB BLD-MCNC: 9.9 G/DL — LOW (ref 11.5–15.5)
IMM GRANULOCYTES NFR BLD AUTO: 0.9 % — SIGNIFICANT CHANGE UP (ref 0–1.5)
LYMPHOCYTES # BLD AUTO: 1.33 K/UL — SIGNIFICANT CHANGE UP (ref 1–3.3)
LYMPHOCYTES # BLD AUTO: 17.3 % — SIGNIFICANT CHANGE UP (ref 13–44)
MCHC RBC-ENTMCNC: 30.3 PG — SIGNIFICANT CHANGE UP (ref 27–34)
MCHC RBC-ENTMCNC: 32.1 GM/DL — SIGNIFICANT CHANGE UP (ref 32–36)
MCV RBC AUTO: 94.2 FL — SIGNIFICANT CHANGE UP (ref 80–100)
MONOCYTES # BLD AUTO: 0.67 K/UL — SIGNIFICANT CHANGE UP (ref 0–0.9)
MONOCYTES NFR BLD AUTO: 8.7 % — SIGNIFICANT CHANGE UP (ref 2–14)
NEUTROPHILS # BLD AUTO: 5.4 K/UL — SIGNIFICANT CHANGE UP (ref 1.8–7.4)
NEUTROPHILS NFR BLD AUTO: 70.1 % — SIGNIFICANT CHANGE UP (ref 43–77)
NRBC # BLD: 0 /100 WBCS — SIGNIFICANT CHANGE UP (ref 0–0)
PLATELET # BLD AUTO: 435 K/UL — HIGH (ref 150–400)
POTASSIUM SERPL-MCNC: 3.6 MMOL/L — SIGNIFICANT CHANGE UP (ref 3.5–5.3)
POTASSIUM SERPL-SCNC: 3.6 MMOL/L — SIGNIFICANT CHANGE UP (ref 3.5–5.3)
PROT SERPL-MCNC: 6.3 G/DL — SIGNIFICANT CHANGE UP (ref 6–8.3)
RBC # BLD: 3.27 M/UL — LOW (ref 3.8–5.2)
RBC # FLD: 13.9 % — SIGNIFICANT CHANGE UP (ref 10.3–14.5)
SODIUM SERPL-SCNC: 141 MMOL/L — SIGNIFICANT CHANGE UP (ref 135–145)
WBC # BLD: 7.7 K/UL — SIGNIFICANT CHANGE UP (ref 3.8–10.5)
WBC # FLD AUTO: 7.7 K/UL — SIGNIFICANT CHANGE UP (ref 3.8–10.5)

## 2020-12-02 PROCEDURE — 99232 SBSQ HOSP IP/OBS MODERATE 35: CPT

## 2020-12-02 RX ORDER — SODIUM CHLORIDE 9 MG/ML
1000 INJECTION, SOLUTION INTRAVENOUS
Refills: 0 | Status: DISCONTINUED | OUTPATIENT
Start: 2020-12-02 | End: 2020-12-03

## 2020-12-02 RX ORDER — LACTOBACILLUS ACIDOPHILUS 100MM CELL
1 CAPSULE ORAL DAILY
Refills: 0 | Status: DISCONTINUED | OUTPATIENT
Start: 2020-12-02 | End: 2020-12-04

## 2020-12-02 RX ADMIN — Medication 325 MILLIGRAM(S): at 12:34

## 2020-12-02 RX ADMIN — Medication 1 TABLET(S): at 12:34

## 2020-12-02 RX ADMIN — PIPERACILLIN AND TAZOBACTAM 25 GRAM(S): 4; .5 INJECTION, POWDER, LYOPHILIZED, FOR SOLUTION INTRAVENOUS at 08:57

## 2020-12-02 RX ADMIN — Medication 600 MILLIGRAM(S): at 00:50

## 2020-12-02 RX ADMIN — Medication 500 MILLIGRAM(S): at 12:34

## 2020-12-02 RX ADMIN — PIPERACILLIN AND TAZOBACTAM 25 GRAM(S): 4; .5 INJECTION, POWDER, LYOPHILIZED, FOR SOLUTION INTRAVENOUS at 00:14

## 2020-12-02 RX ADMIN — HEPARIN SODIUM 5000 UNIT(S): 5000 INJECTION INTRAVENOUS; SUBCUTANEOUS at 10:45

## 2020-12-02 RX ADMIN — HEPARIN SODIUM 5000 UNIT(S): 5000 INJECTION INTRAVENOUS; SUBCUTANEOUS at 22:25

## 2020-12-02 RX ADMIN — PIPERACILLIN AND TAZOBACTAM 25 GRAM(S): 4; .5 INJECTION, POWDER, LYOPHILIZED, FOR SOLUTION INTRAVENOUS at 16:21

## 2020-12-02 NOTE — PROGRESS NOTE ADULT - ASSESSMENT
29y/o F now POD#9 s/p abdominal myomectomy with known postoperative deemed stable hematoma admitted with postoperative fever. CTAP obtained in ED demonstrative of stable hematoma, inability to r/o infected hematoma given extensive fat stranding.  Patient noted to be persistently tachycardic and febrile while on Zosyn, Vancomycin added (11/29) with last documented fever to 38.7 (12/1 @ 1:30a).

## 2020-12-02 NOTE — PROGRESS NOTE ADULT - SUBJECTIVE AND OBJECTIVE BOX
GYN Progress Note    POD#9   HD#5    Patient seen and examined at bedside by GYN team.  Overnight patient woke with fevers and night sweats. Pt also reports profuse, watery diarrhea every time she uses the bathroom. No other acute concerns.  Pain well controlled.  Patient is ambulating and tolerating reg diet.  Patient is passing flatus.   Patient is voiding spontanously.   Denies CP, SOB, N/V, fevers, and chills.    Vital Signs Last 24 Hours  T(C): 36.9 (12-02-20 @ 06:01), Max: 38.7 (12-02-20 @ 01:30)  HR: 77 (12-02-20 @ 06:01) (77 - 101)  BP: 113/79 (12-02-20 @ 06:01) (113/79 - 123/80)  RR: 18 (12-02-20 @ 06:01) (18 - 18)  SpO2: 96% (12-02-20 @ 06:01) (95% - 99%)    I&O's Summary    30 Nov 2020 07:01  -  01 Dec 2020 07:00  --------------------------------------------------------  IN: 1662 mL / OUT: 1850 mL / NET: -188 mL        Physical Exam:  General: NAD  CV: RRR  Lungs: CTAB  Abdomen: soft, appropriately-tender, softly distended, hyperactive bowel sounds  Incision: midline incision CDI  : no bleeding on pad  Ext: no pain or swelling     Labs:                        10.0   7.84  )-----------( 398      ( 01 Dec 2020 07:08 )             30.5   baso 0.5    eos 2.8    imm gran 1.0    lymph 18.4   mono 12.6   poly 64.7                         10.0   10.30 )-----------( 347      ( 30 Nov 2020 07:16 )             31.1   baso 0.2    eos 2.2    imm gran 1.7    lymph 10.3   mono 13.4   poly 72.2                         10.3   12.95 )-----------( 289      ( 29 Nov 2020 07:17 )             30.8   baso 0.2    eos 0.4    imm gran 1.1    lymph 10.3   mono 12.5   poly 75.5                10.0<L>  7.84  )-----------( 398      ( 12-01 @ 07:08 )             30.5<L>               10.0<L>  10.30 )-----------( 347      ( 11-30 @ 07:16 )             31.1<L>               10.3<L>  12.95<H> )-----------( 289      ( 11-29 @ 07:17 )             30.8<L>               10.1<L>  12.90<H> )-----------( 260      ( 11-29 @ 00:19 )             30.5<L>               10.4<L>  12.71<H> )-----------( 252      ( 11-28 @ 11:10 )             30.8<L>      12-01    140  |  104  |  6<L>  ----------------------------<  106<H>  3.6   |  24  |  0.92    Ca    9.2      01 Dec 2020 07:07    TPro  6.0  /  Alb  3.1<L>  /  TBili  0.8  /  DBili  x   /  AST  22  /  ALT  32  /  AlkPhos  113  11-30    PT/INR - ( 30 Nov 2020 07:18 )   PT: 12.1 sec;   INR: 1.01 ratio         PTT - ( 30 Nov 2020 07:18 )  PTT:30.4 sec      ABO Interpretation: A (11-30-20)  Antibody Screen: Negative (11-30-20)      MEDICATIONS  (STANDING):  ascorbic acid 500 milliGRAM(s) Oral daily  ferrous    sulfate 325 milliGRAM(s) Oral daily  heparin   Injectable 5000 Unit(s) SubCutaneous every 12 hours  influenza   Vaccine 0.5 milliLiter(s) IntraMuscular once  piperacillin/tazobactam IVPB.. 3.375 Gram(s) IV Intermittent every 8 hours  senna 2 Tablet(s) Oral at bedtime  simethicone 80 milliGRAM(s) Chew every 6 hours  vancomycin  IVPB 1000 milliGRAM(s) IV Intermittent every 12 hours    MEDICATIONS  (PRN):  ibuprofen  Tablet. 600 milliGRAM(s) Oral every 6 hours PRN Moderate Pain (4 - 6)  traMADol 25 milliGRAM(s) Oral every 6 hours PRN Severe Pain (7 - 10)

## 2020-12-02 NOTE — PROGRESS NOTE ADULT - ATTENDING COMMENTS
Patient seen and examined, agree with above. Continues to be febrile overnight, WBC still normal, pt still feels well, only complaint of loose stools. Will continue with vanc/zosyn, discuss with ID possible dose adjustments for bump in creatinine, restart IV fluids in case of pre-renal component. Cultures all preliminary negative at this point, will follow up final values. Continue to monitor today, if consistently still spiking fevers over next few days, may need to reconsider IR drainage or as last resort re-operation to evacuate presumed infected hematoma.    Domingo Middleton MD Patient seen and examined, agree with above. Continues to be febrile overnight, WBC still normal, pt still feels well, only complaint of loose stools. Will continue with vanc/zosyn, discuss with ID possible dose adjustments for bump in creatinine, restart IV fluids in case of pre-renal component. Cultures all preliminary negative at this point, will follow up final values. Continue to monitor today, if consistently still spiking fevers over next few days, may need to reconsider IR drainage or as last resort re-operation to evacuate presumed infected hematoma.    Domingo Middleton MD    GYN Attending Note  pt seen and examined. agree with above. will continue to follow ID recs. Have been in discussion with pt regarding possibility of re-operation. will await cx for now.     ANT Renee

## 2020-12-02 NOTE — PROGRESS NOTE ADULT - PROBLEM SELECTOR PLAN 1
#Post-operative Fever    Plan:   CV: cont to monitor tachycardia,- likely result of infectious process, CTA on prior admission r/o PE, dopplers 11/28 r/o DVT  Resp: cont IS  GI: Reg diet, start lactobacillus daily, test for c.diff toxin   : voiding without difficulty  ID: on Vanc/zosyn (11/28 - ). Increased Vanc 2/2 low trough, would appreciate ID recs on dosing. ID following. Continue to trend LFTs. HIV neg. F/u final BCx (11/28) and Drain fluid Cx (11/30). UCx (11/28): Nl jose.   DVT: cont HSQ for DVT ppx.     Sybil Han, PGY-1 #Post-operative Fever    Plan:   CV: cont to monitor tachycardia,- likely result of infectious process, CTA on prior admission r/o PE, dopplers 11/28 r/o DVT  Resp: cont IS  GI: Reg diet, start lactobacillus daily, test for c.diff toxin   : Voiding without difficulty. Pt with SUNDAR. Will restart fluids given possible hypovolemia 2/2 diarrhea. Would appreciate ID recs about Abx given SUNDAR.   ID: on Vanc/zosyn (11/28 - ). Increased Vanc 2/2 low trough, would appreciate ID recs on dosing. ID following. Continue to trend LFTs. HIV neg. F/u final BCx (11/28) and Drain fluid Cx (11/30). UCx (11/28): Nl jose.   DVT: cont HSQ for DVT ppx.     Sybil Han, PGY-1

## 2020-12-02 NOTE — PROGRESS NOTE ADULT - ASSESSMENT
30 f s/p myomectomy 11/23 c/b large pelvic hematoma, discharged 11/26 and came back 11/27 with fever, chills, diaphoresis  here persistently febrile, WBC: 12.9  blood and urine cx negative  abd/pelvis CT: No significant change in large pelvic hematoma displacing the uterus left of midline. No new site of bleeding or progressive mass effect. Superinfected hematoma cannot be excluded given extensive surrounding fat stranding and blood products.  pelvic MRI with Hematoma in the pelvis immediately adjacent to the uterine defects measuring 13.5 x 12.3 x 9.6 cm. No large amount of air within the collection  s/p IR drainage 11/30 but no drain was placed    fever, leukocytosis, sepsis after myomectomy with large pelvic hematoma next to the uterus wall defect, ? infected   s/p IR drainage 11/30 with no drain  pt is  febrile    * monitor the  temp curve and WBC  * f/u IR for culture  * c/w zosyn 3.375 q 8 , if c diff is positive then I would d/c the zosyn  d/c the vancomycin . the combination can be nephrotoxic and I am concerned by the rising WBC    I wonder if original fevers were just from the hematoma ( with or without superinfection- unclear- aspirate doesn't show polys though)  and am concerned that the patient could potentially now  have c diff  Pt denies respiratory sxs, urinary sxs , abdominal discomfort , and has no sxs of phlebitis       The above assessment and plan was discussed with GYN

## 2020-12-03 LAB
ALBUMIN SERPL ELPH-MCNC: 3.2 G/DL — LOW (ref 3.3–5)
ALP SERPL-CCNC: 139 U/L — HIGH (ref 40–120)
ALT FLD-CCNC: 40 U/L — SIGNIFICANT CHANGE UP (ref 10–45)
ANION GAP SERPL CALC-SCNC: 12 MMOL/L — SIGNIFICANT CHANGE UP (ref 5–17)
AST SERPL-CCNC: 29 U/L — SIGNIFICANT CHANGE UP (ref 10–40)
BASOPHILS # BLD AUTO: 0.02 K/UL — SIGNIFICANT CHANGE UP (ref 0–0.2)
BASOPHILS NFR BLD AUTO: 0.2 % — SIGNIFICANT CHANGE UP (ref 0–2)
BILIRUB SERPL-MCNC: 0.5 MG/DL — SIGNIFICANT CHANGE UP (ref 0.2–1.2)
BUN SERPL-MCNC: 7 MG/DL — SIGNIFICANT CHANGE UP (ref 7–23)
CALCIUM SERPL-MCNC: 9.1 MG/DL — SIGNIFICANT CHANGE UP (ref 8.4–10.5)
CHLORIDE SERPL-SCNC: 105 MMOL/L — SIGNIFICANT CHANGE UP (ref 96–108)
CO2 SERPL-SCNC: 22 MMOL/L — SIGNIFICANT CHANGE UP (ref 22–31)
CREAT SERPL-MCNC: 1.21 MG/DL — SIGNIFICANT CHANGE UP (ref 0.5–1.3)
CULTURE RESULTS: SIGNIFICANT CHANGE UP
CULTURE RESULTS: SIGNIFICANT CHANGE UP
EOSINOPHIL # BLD AUTO: 0.16 K/UL — SIGNIFICANT CHANGE UP (ref 0–0.5)
EOSINOPHIL NFR BLD AUTO: 1.8 % — SIGNIFICANT CHANGE UP (ref 0–6)
GLUCOSE SERPL-MCNC: 107 MG/DL — HIGH (ref 70–99)
HCT VFR BLD CALC: 29 % — LOW (ref 34.5–45)
HGB BLD-MCNC: 9.5 G/DL — LOW (ref 11.5–15.5)
IMM GRANULOCYTES NFR BLD AUTO: 1 % — SIGNIFICANT CHANGE UP (ref 0–1.5)
LYMPHOCYTES # BLD AUTO: 1.44 K/UL — SIGNIFICANT CHANGE UP (ref 1–3.3)
LYMPHOCYTES # BLD AUTO: 16.5 % — SIGNIFICANT CHANGE UP (ref 13–44)
MCHC RBC-ENTMCNC: 30.2 PG — SIGNIFICANT CHANGE UP (ref 27–34)
MCHC RBC-ENTMCNC: 32.8 GM/DL — SIGNIFICANT CHANGE UP (ref 32–36)
MCV RBC AUTO: 92.1 FL — SIGNIFICANT CHANGE UP (ref 80–100)
MONOCYTES # BLD AUTO: 0.64 K/UL — SIGNIFICANT CHANGE UP (ref 0–0.9)
MONOCYTES NFR BLD AUTO: 7.3 % — SIGNIFICANT CHANGE UP (ref 2–14)
NEUTROPHILS # BLD AUTO: 6.36 K/UL — SIGNIFICANT CHANGE UP (ref 1.8–7.4)
NEUTROPHILS NFR BLD AUTO: 73.2 % — SIGNIFICANT CHANGE UP (ref 43–77)
NRBC # BLD: 0 /100 WBCS — SIGNIFICANT CHANGE UP (ref 0–0)
PLATELET # BLD AUTO: 468 K/UL — HIGH (ref 150–400)
POTASSIUM SERPL-MCNC: 3.5 MMOL/L — SIGNIFICANT CHANGE UP (ref 3.5–5.3)
POTASSIUM SERPL-SCNC: 3.5 MMOL/L — SIGNIFICANT CHANGE UP (ref 3.5–5.3)
PROT SERPL-MCNC: 6.2 G/DL — SIGNIFICANT CHANGE UP (ref 6–8.3)
RBC # BLD: 3.15 M/UL — LOW (ref 3.8–5.2)
RBC # FLD: 14.1 % — SIGNIFICANT CHANGE UP (ref 10.3–14.5)
SARS-COV-2 RNA SPEC QL NAA+PROBE: SIGNIFICANT CHANGE UP
SODIUM SERPL-SCNC: 139 MMOL/L — SIGNIFICANT CHANGE UP (ref 135–145)
SPECIMEN SOURCE: SIGNIFICANT CHANGE UP
SPECIMEN SOURCE: SIGNIFICANT CHANGE UP
WBC # BLD: 8.71 K/UL — SIGNIFICANT CHANGE UP (ref 3.8–10.5)
WBC # FLD AUTO: 8.71 K/UL — SIGNIFICANT CHANGE UP (ref 3.8–10.5)

## 2020-12-03 PROCEDURE — 99232 SBSQ HOSP IP/OBS MODERATE 35: CPT

## 2020-12-03 RX ORDER — SODIUM CHLORIDE 9 MG/ML
3 INJECTION INTRAMUSCULAR; INTRAVENOUS; SUBCUTANEOUS EVERY 8 HOURS
Refills: 0 | Status: DISCONTINUED | OUTPATIENT
Start: 2020-12-03 | End: 2020-12-04

## 2020-12-03 RX ADMIN — Medication 325 MILLIGRAM(S): at 12:38

## 2020-12-03 RX ADMIN — SODIUM CHLORIDE 3 MILLILITER(S): 9 INJECTION INTRAMUSCULAR; INTRAVENOUS; SUBCUTANEOUS at 22:07

## 2020-12-03 RX ADMIN — Medication 1 TABLET(S): at 17:56

## 2020-12-03 RX ADMIN — Medication 1 TABLET(S): at 12:38

## 2020-12-03 RX ADMIN — SIMETHICONE 80 MILLIGRAM(S): 80 TABLET, CHEWABLE ORAL at 17:58

## 2020-12-03 RX ADMIN — PIPERACILLIN AND TAZOBACTAM 25 GRAM(S): 4; .5 INJECTION, POWDER, LYOPHILIZED, FOR SOLUTION INTRAVENOUS at 08:53

## 2020-12-03 RX ADMIN — PIPERACILLIN AND TAZOBACTAM 25 GRAM(S): 4; .5 INJECTION, POWDER, LYOPHILIZED, FOR SOLUTION INTRAVENOUS at 00:18

## 2020-12-03 RX ADMIN — HEPARIN SODIUM 5000 UNIT(S): 5000 INJECTION INTRAVENOUS; SUBCUTANEOUS at 10:24

## 2020-12-03 RX ADMIN — HEPARIN SODIUM 5000 UNIT(S): 5000 INJECTION INTRAVENOUS; SUBCUTANEOUS at 22:04

## 2020-12-03 RX ADMIN — SIMETHICONE 80 MILLIGRAM(S): 80 TABLET, CHEWABLE ORAL at 22:04

## 2020-12-03 RX ADMIN — Medication 500 MILLIGRAM(S): at 12:38

## 2020-12-03 NOTE — PROGRESS NOTE ADULT - SUBJECTIVE AND OBJECTIVE BOX
Follow Up:  fever, pelvic hematoma, ?abscess    Interval History: the OR culture negative, pt has been afebrile for 48 hours, has some loose BMs but not diarrhea so C-diff could not be sent    ROS:      All other systems negative    Constitutional: no more fever  Cardiovascular:  no chest pain, no palpitation  Respiratory:  no SOB, no cough  GI:  no abd pain, no vomiting, loose BMs  urinary: no dysuria, no hematuria, no flank pain  musculoskeletal:  no joint pain, no joint swelling  skin:  no rash  neurology:  no headache, no seizure        Allergies  Ceclor (Rash)        ANTIMICROBIALS:  amoxicillin  875 milliGRAM(s)/clavulanate 1 two times a day      OTHER MEDS:  ascorbic acid 500 milliGRAM(s) Oral daily  ferrous    sulfate 325 milliGRAM(s) Oral daily  heparin   Injectable 5000 Unit(s) SubCutaneous every 12 hours  ibuprofen  Tablet. 600 milliGRAM(s) Oral every 6 hours PRN  influenza   Vaccine 0.5 milliLiter(s) IntraMuscular once  lactated ringers. 1000 milliLiter(s) IV Continuous <Continuous>  lactobacillus acidophilus 1 Tablet(s) Oral daily  simethicone 80 milliGRAM(s) Chew every 6 hours  traMADol 25 milliGRAM(s) Oral every 6 hours PRN      Vital Signs Last 24 Hrs  T(C): 37.3 (03 Dec 2020 13:33), Max: 37.5 (02 Dec 2020 16:22)  T(F): 99.1 (03 Dec 2020 13:33), Max: 99.5 (02 Dec 2020 16:22)  HR: 88 (03 Dec 2020 13:33) (85 - 103)  BP: 134/92 (03 Dec 2020 13:33) (115/78 - 134/92)  BP(mean): --  RR: 18 (03 Dec 2020 13:33) (18 - 18)  SpO2: 99% (03 Dec 2020 13:33) (96% - 99%)    Physical Exam:  General:    NAD,  non toxic  Cardio:     regular S1, S2,  no murmur  Respiratory:    clear b/l,    no wheezing  abd:     soft,   BS +,   no tenderness  :   no CVAT,  no suprapubic tenderness,   no  lovett  Musculoskeletal:   no joint swelling  vascular: no phlebitis  Skin:    no rash                          9.5    8.71  )-----------( 468      ( 03 Dec 2020 07:08 )             29.0       12-03    139  |  105  |  7   ----------------------------<  107<H>  3.5   |  22  |  1.21    Ca    9.1      03 Dec 2020 07:08    TPro  6.2  /  Alb  3.2<L>  /  TBili  0.5  /  DBili  x   /  AST  29  /  ALT  40  /  AlkPhos  139<H>  12-03          MICROBIOLOGY:  v  .Body Fluid hematoma  12-01-20   No growth  --    No polymorphonuclear cells seen per low power field  No organisms seen per oil power field      .Urine Clean Catch (Midstream)  11-28-20   <10,000 CFU/mL Normal Urogenital Elvira  --  --      .Blood Blood-Peripheral  11-28-20   No Growth Final  --  --                RADIOLOGY:  Images independently visualized and reviewed personally, findings as below  < from: IR Procedure (11.30.20 @ 15:35) >    Fluid collection aspiration  The patient was positioned supine. Initial imaging was performed. Local anesthesia was administered. The fluid collection was accessed using an access needle. Position within the fluid collection was confirmed, and fluid aspiration was performed. All instruments were then removed.  - Initial imaging findings: Moderate sized pelvic collection with complex material likely representing hematoma  - Aspiration needle/catheter: 5 [place] Yueh centesis needle  - Post-aspiration imaging findings: No significant change    < end of copied text >  < from: MR Abdomen w/wo IV Cont (11.29.20 @ 23:17) >  IMPRESSION:  Status post myomectomy with a postsurgical defect in the right lateral wall of the uterus.    Large pelvic hematoma as above.

## 2020-12-03 NOTE — PROVIDER CONTACT NOTE (OTHER) - ACTION/TREATMENT ORDERED:
advised pt to wipe rectal area prior to urinating and to call RN if she has any additional bleeding.

## 2020-12-03 NOTE — PROGRESS NOTE ADULT - ATTENDING COMMENTS
Patient seen and examined, agree with above. Pt afebrile for over 24 hours now, status appears to be improving. WBC still normal, Cr still elevated but stable, slightly lower at 1.21. Will continue to monitor for fevers, f/u with ID.    Domingo Middleton MD Patient seen and examined, agree with above. Pt afebrile for over 24 hours now, status appears to be improving. WBC still normal, Cr still elevated but stable, slightly lower at 1.21. Will continue to monitor for fevers, f/u with ID.    Domingo Middleton MD    pt seen and examined. feeling better overall. afebrile for 36hr now. will transition to oral abx and continue observation. discussed with Dr. Dominguez. Samantha for dc tomorrow. Will monitor for diarrhea, but seems to be resolved. C Diff unlikely. Will continue to monitor closely.    NAT Renee

## 2020-12-03 NOTE — PROGRESS NOTE ADULT - ASSESSMENT
31y/o F now POD#10 s/p abdominal myomectomy with known postoperative deemed stable hematoma admitted with postoperative fever. CTAP obtained in ED demonstrative of stable hematoma, inability to r/o infected hematoma given extensive fat stranding.  Patient noted to be persistently tachycardic and febrile while on Zosyn, Vancomycin added (11/29) with last documented fever to 38.7 (12/2 @ 1:30a).      29y/o F now POD#10 s/p abdominal myomectomy with known postoperative deemed stable hematoma admitted with postoperative fever. CTAP obtained in ED demonstrative of stable hematoma, inability to r/o infected hematoma given extensive fat stranding.  Patient previously noted to be persistently tachycardic and febrile while on Zosyn, Vancomycin added (11/29) with last documented fever to 38.7 (12/2 @ 1:30a).

## 2020-12-03 NOTE — PROGRESS NOTE ADULT - SUBJECTIVE AND OBJECTIVE BOX
GYN Progress Note    POD#10   HD#6    Patient seen and examined at bedside by GYN team.  No acute events overnight. Patient developed some bright red blood per vagina which she believes is her period. No associated cramping. Otherwise, reports diarrhea resolved. Pain well controlled.  Patient is ambulating and tolerating reg diet.  Patient is passing flatus.   Patient is voiding spontanously.   Denies CP, SOB, N/V, fevers, and chills.    Vital Signs Last 24 Hours  T(C): 37 (12-03-20 @ 00:21), Max: 37.5 (12-02-20 @ 16:22)  HR: 101 (12-03-20 @ 00:21) (85 - 103)  BP: 131/81 (12-03-20 @ 00:21) (115/78 - 131/81)  RR: 18 (12-03-20 @ 00:21) (18 - 18)  SpO2: 96% (12-02-20 @ 20:29) (96% - 98%)    I&O's Summary      Physical Exam:  General: NAD  CV: RRR  Lungs: CTAB  Abdomen: soft, appropriately-tender, softly distended, normoactive bowel sounds  Incision: midline incision has inferior section ~0.5cm with superficial opening, no discharge or redness in the surrounding area. Otherwise incision CDI.  : bleeding on pad, <5% saturated  Ext: no pain or swelling     Labs:                        9.9    7.70  )-----------( 435      ( 02 Dec 2020 06:08 )             30.8   baso 0.3    eos 2.7    imm gran 0.9    lymph 17.3   mono 8.7    poly 70.1                         10.0   7.84  )-----------( 398      ( 01 Dec 2020 07:08 )             30.5   baso 0.5    eos 2.8    imm gran 1.0    lymph 18.4   mono 12.6   poly 64.7                         10.0   10.30 )-----------( 347      ( 30 Nov 2020 07:16 )             31.1   baso 0.2    eos 2.2    imm gran 1.7    lymph 10.3   mono 13.4   poly 72.2                9.9<L>  7.70  )-----------( 435<H>    ( 12-02 @ 06:08 )             30.8<L>               10.0<L>  7.84  )-----------( 398      ( 12-01 @ 07:08 )             30.5<L>               10.0<L>  10.30 )-----------( 347      ( 11-30 @ 07:16 )             31.1<L>               10.3<L>  12.95<H> )-----------( 289      ( 11-29 @ 07:17 )             30.8<L>               10.1<L>  12.90<H> )-----------( 260      ( 11-29 @ 00:19 )             30.5<L>      12-02    141  |  104  |  8   ----------------------------<  103<H>  3.6   |  23  |  1.24    Ca    9.2      02 Dec 2020 06:08    TPro  6.3  /  Alb  3.2<L>  /  TBili  0.5  /  DBili  x   /  AST  33  /  ALT  40  /  AlkPhos  161<H>  12-02          ABO Interpretation: A (11-30-20)  Antibody Screen: Negative (11-30-20)      MEDICATIONS  (STANDING):  ascorbic acid 500 milliGRAM(s) Oral daily  ferrous    sulfate 325 milliGRAM(s) Oral daily  heparin   Injectable 5000 Unit(s) SubCutaneous every 12 hours  influenza   Vaccine 0.5 milliLiter(s) IntraMuscular once  lactated ringers. 1000 milliLiter(s) (125 mL/Hr) IV Continuous <Continuous>  lactobacillus acidophilus 1 Tablet(s) Oral daily  piperacillin/tazobactam IVPB.. 3.375 Gram(s) IV Intermittent every 8 hours  simethicone 80 milliGRAM(s) Chew every 6 hours    MEDICATIONS  (PRN):  ibuprofen  Tablet. 600 milliGRAM(s) Oral every 6 hours PRN Moderate Pain (4 - 6)  traMADol 25 milliGRAM(s) Oral every 6 hours PRN Severe Pain (7 - 10)     GYN Progress Note    POD#10   HD#6    Patient seen and examined at bedside by GYN team.  No acute events overnight. Patient developed some bright red blood per vagina which she believes is her period. No associated cramping. Otherwise, reports diarrhea resolved. Pain well controlled.  Patient is ambulating and tolerating reg diet.  Patient is passing flatus.   Patient is voiding spontanously.   Denies CP, SOB, N/V, fevers, and chills.    Vital Signs Last 24 Hours  T(C): 37 (12-03-20 @ 00:21), Max: 37.5 (12-02-20 @ 16:22)  HR: 101 (12-03-20 @ 00:21) (85 - 103)  BP: 131/81 (12-03-20 @ 00:21) (115/78 - 131/81)  RR: 18 (12-03-20 @ 00:21) (18 - 18)  SpO2: 96% (12-02-20 @ 20:29) (96% - 98%)    I&O's Summary      Physical Exam:  General: NAD  CV: RRR  Lungs: CTAB  Abdomen: soft, appropriately-tender, softly distended, normoactive bowel sounds  Incision: midline vertical incision clean/dry/intact, no discharge or redness in the surrounding area.   : bleeding on pad, <5% saturated  Ext: no pain or swelling     Labs:                        9.9    7.70  )-----------( 435      ( 02 Dec 2020 06:08 )             30.8   baso 0.3    eos 2.7    imm gran 0.9    lymph 17.3   mono 8.7    poly 70.1                         10.0   7.84  )-----------( 398      ( 01 Dec 2020 07:08 )             30.5   baso 0.5    eos 2.8    imm gran 1.0    lymph 18.4   mono 12.6   poly 64.7                         10.0   10.30 )-----------( 347      ( 30 Nov 2020 07:16 )             31.1   baso 0.2    eos 2.2    imm gran 1.7    lymph 10.3   mono 13.4   poly 72.2                9.9<L>  7.70  )-----------( 435<H>    ( 12-02 @ 06:08 )             30.8<L>               10.0<L>  7.84  )-----------( 398      ( 12-01 @ 07:08 )             30.5<L>               10.0<L>  10.30 )-----------( 347      ( 11-30 @ 07:16 )             31.1<L>               10.3<L>  12.95<H> )-----------( 289      ( 11-29 @ 07:17 )             30.8<L>               10.1<L>  12.90<H> )-----------( 260      ( 11-29 @ 00:19 )             30.5<L>      12-02    141  |  104  |  8   ----------------------------<  103<H>  3.6   |  23  |  1.24    Ca    9.2      02 Dec 2020 06:08    TPro  6.3  /  Alb  3.2<L>  /  TBili  0.5  /  DBili  x   /  AST  33  /  ALT  40  /  AlkPhos  161<H>  12-02          ABO Interpretation: A (11-30-20)  Antibody Screen: Negative (11-30-20)      MEDICATIONS  (STANDING):  ascorbic acid 500 milliGRAM(s) Oral daily  ferrous    sulfate 325 milliGRAM(s) Oral daily  heparin   Injectable 5000 Unit(s) SubCutaneous every 12 hours  influenza   Vaccine 0.5 milliLiter(s) IntraMuscular once  lactated ringers. 1000 milliLiter(s) (125 mL/Hr) IV Continuous <Continuous>  lactobacillus acidophilus 1 Tablet(s) Oral daily  piperacillin/tazobactam IVPB.. 3.375 Gram(s) IV Intermittent every 8 hours  simethicone 80 milliGRAM(s) Chew every 6 hours    MEDICATIONS  (PRN):  ibuprofen  Tablet. 600 milliGRAM(s) Oral every 6 hours PRN Moderate Pain (4 - 6)  traMADol 25 milliGRAM(s) Oral every 6 hours PRN Severe Pain (7 - 10)     GYN Progress Note    POD#10   HD#6    Patient seen and examined at bedside by GYN team.  No acute events overnight. Patient developed some bright red blood per vagina which she believes is her period. No associated cramping. Otherwise, reports diarrhea resolved. Pain well controlled.  Patient is ambulating and tolerating reg diet.  Patient is passing flatus.   Patient is voiding spontanously.   Denies CP, SOB, N/V, fevers, and chills.    Vital Signs Last 24 Hours  T(C): 37 (12-03-20 @ 00:21), Max: 37.5 (12-02-20 @ 16:22)  HR: 101 (12-03-20 @ 00:21) (85 - 103)  BP: 131/81 (12-03-20 @ 00:21) (115/78 - 131/81)  RR: 18 (12-03-20 @ 00:21) (18 - 18)  SpO2: 96% (12-02-20 @ 20:29) (96% - 98%)    I&O's Summary      Physical Exam:  General: NAD  CV: RRR  Lungs: CTAB  Abdomen: soft, appropriately-tender, softly distended, normoactive bowel sounds  Incision: midline vertical incision clean/dry/intact, no discharge or redness in the surrounding area.   : bleeding on pad, <5% saturated  Ext: no pain or swelling     Labs:                        9.9    7.70  )-----------( 435      ( 02 Dec 2020 06:08 )             30.8   baso 0.3    eos 2.7    imm gran 0.9    lymph 17.3   mono 8.7    poly 70.1                         10.0   7.84  )-----------( 398      ( 01 Dec 2020 07:08 )             30.5   baso 0.5    eos 2.8    imm gran 1.0    lymph 18.4   mono 12.6   poly 64.7                         10.0   10.30 )-----------( 347      ( 30 Nov 2020 07:16 )             31.1   baso 0.2    eos 2.2    imm gran 1.7    lymph 10.3   mono 13.4   poly 72.2                9.9<L>  7.70  )-----------( 435<H>    ( 12-02 @ 06:08 )             30.8<L>               10.0<L>  7.84  )-----------( 398      ( 12-01 @ 07:08 )             30.5<L>               10.0<L>  10.30 )-----------( 347      ( 11-30 @ 07:16 )             31.1<L>               10.3<L>  12.95<H> )-----------( 289      ( 11-29 @ 07:17 )             30.8<L>               10.1<L>  12.90<H> )-----------( 260      ( 11-29 @ 00:19 )             30.5<L>      12-02    141  |  104  |  8   ----------------------------<  103<H>  3.6   |  23  |  1.24    Ca    9.2      02 Dec 2020 06:08    TPro  6.3  /  Alb  3.2<L>  /  TBili  0.5  /  DBili  x   /  AST  33  /  ALT  40  /  AlkPhos  161<H>  12-02          ABO Interpretation: A (11-30-20)  Antibody Screen: Negative (11-30-20)      MEDICATIONS  (STANDING):  ascorbic acid 500 milliGRAM(s) Oral daily  ferrous    sulfate 325 milliGRAM(s) Oral daily  heparin   Injectable 5000 Unit(s) SubCutaneous every 12 hours  influenza   Vaccine 0.5 milliLiter(s) IntraMuscular once  lactated ringers. 1000 milliLiter(s) (125 mL/Hr) IV Continuous <Continuous>  lactobacillus acidophilus 1 Tablet(s) Oral daily  piperacillin/tazobactam IVPB.. 3.375 Gram(s) IV Intermittent every 8 hours  simethicone 80 milliGRAM(s) Chew every 6 hours    MEDICATIONS  (PRN):  ibuprofen  Tablet. 600 milliGRAM(s) Oral every 6 hours PRN Moderate Pain (4 - 6)  traMADol 25 milliGRAM(s) Oral every 6 hours PRN Severe Pain (7 - 10)    PA/Lab Addendum:                    9.5    8.71  )-----------( 468      ( 03 Dec 2020 07:08 )             29.0     Comprehensive Metabolic Panel (12.03.20 @ 07:08)    Sodium, Serum: 139 mmol/L    Potassium, Serum: 3.5 mmol/L    Chloride, Serum: 105 mmol/L    Carbon Dioxide, Serum: 22 mmol/L    Anion Gap, Serum: 12 mmol/L    Blood Urea Nitrogen, Serum: 7 mg/dL    Creatinine, Serum: 1.21 mg/dL    Glucose, Serum: 107 mg/dL    Calcium, Total Serum: 9.1 mg/dL    Protein Total, Serum: 6.2 g/dL    Albumin, Serum: 3.2 g/dL    Bilirubin Total, Serum: 0.5 mg/dL    Alkaline Phosphatase, Serum: 139 U/L    Aspartate Aminotransferase (AST/SGOT): 29 U/L    Alanine Aminotransferase (ALT/SGPT): 40 U/L    WALKER Perla

## 2020-12-03 NOTE — PROGRESS NOTE ADULT - ASSESSMENT
30 f s/p myomectomy 11/23 c/b large pelvic hematoma, discharged 11/26 and came back 11/27 with fever, chills, diaphoresis  here persistently febrile, WBC: 12.9  blood and urine cx negative  abd/pelvis CT: No significant change in large pelvic hematoma displacing the uterus left of midline. No new site of bleeding or progressive mass effect. Superinfected hematoma cannot be excluded given extensive surrounding fat stranding and blood products.  pelvic MRI with Hematoma in the pelvis immediately adjacent to the uterine defects measuring 13.5 x 12.3 x 9.6 cm. No large amount of air within the collection  s/p IR drainage 11/30 but no drain was placed    fever, leukocytosis, sepsis after myomectomy with large pelvic hematoma next to the uterus wall defect, ? infected   s/p IR drainage 11/30 with no drain, cx negative and now pt afebrile for 48h, not sure if the fever was due to hematoma or it was infected and the cx is negative because it was done on antibiotics  diarrhea likely antibiotic induced, not watery so C-diff could not be sent  * s/p vanco 11/28-12/2, still on zosyn now day 6  * would repeat an abd/pelvis CT with contrast   * switch to augmentin 875 bid and monitor for fever    The above assessment and plan was discussed with GYN    Mare Dominguez MD  Pager [phone]  After 5pm and on weekends call [phone]

## 2020-12-03 NOTE — PROGRESS NOTE ADULT - PROBLEM SELECTOR PLAN 1
#Post-operative Fever    Plan:   CV: cont to monitor tachycardia,- likely result of infectious process, CTA on prior admission r/o PE, dopplers 11/28 r/o DVT  Resp: cont IS  GI: Reg diet, start lactobacillus daily. Diarrhea resolved, making c.diff less likely. Will discuss discontinuing precautions with ID.   : Voiding without difficulty. Pt with SUNDAR. Continue fluids. Would appreciate ID recs about Abx given SUNDAR.   ID: on zosyn (11/28 - ), s/p Vanc. ID following. Continue to trend LFTs. HIV neg. F/u final BCx (11/28) and Drain fluid Cx (11/30). UCx (11/28): Nl jose.   DVT: cont HSQ for DVT ppx.     Sybil Han, PGY-1 #Post-operative Fever    Plan:   CV: cont to monitor tachycardia,- overall resolved, likely result of infectious process, CTA on prior admission r/o PE, dopplers 11/28 neg for DVT  Resp: cont IS  GI: Reg diet, start lactobacillus daily. Diarrhea resolved, making c.diff less likely. Will discuss discontinuing precautions with ID.   : Voiding without difficulty. Pt with SUNDAR, improving. Continue fluids. Would appreciate ID recs about Abx given SUNDAR.   ID: on zosyn (11/28 - ), s/p Vanc. ID following. Continue to trend LFTs. HIV neg. F/u final BCx (11/28) and Drain fluid Cx (11/30). UCx (11/28): Nl jose.   DVT: cont HSQ for DVT ppx.     Sybil Han, PGY-1

## 2020-12-04 ENCOUNTER — TRANSCRIPTION ENCOUNTER (OUTPATIENT)
Age: 30
End: 2020-12-04

## 2020-12-04 VITALS
TEMPERATURE: 98 F | RESPIRATION RATE: 18 BRPM | SYSTOLIC BLOOD PRESSURE: 116 MMHG | HEART RATE: 93 BPM | DIASTOLIC BLOOD PRESSURE: 80 MMHG | OXYGEN SATURATION: 98 %

## 2020-12-04 LAB
ANION GAP SERPL CALC-SCNC: 13 MMOL/L — SIGNIFICANT CHANGE UP (ref 5–17)
BASOPHILS # BLD AUTO: 0.03 K/UL — SIGNIFICANT CHANGE UP (ref 0–0.2)
BASOPHILS NFR BLD AUTO: 0.3 % — SIGNIFICANT CHANGE UP (ref 0–2)
BUN SERPL-MCNC: 6 MG/DL — LOW (ref 7–23)
CALCIUM SERPL-MCNC: 9.2 MG/DL — SIGNIFICANT CHANGE UP (ref 8.4–10.5)
CHLORIDE SERPL-SCNC: 105 MMOL/L — SIGNIFICANT CHANGE UP (ref 96–108)
CO2 SERPL-SCNC: 23 MMOL/L — SIGNIFICANT CHANGE UP (ref 22–31)
CREAT SERPL-MCNC: 1.04 MG/DL — SIGNIFICANT CHANGE UP (ref 0.5–1.3)
EOSINOPHIL # BLD AUTO: 0.26 K/UL — SIGNIFICANT CHANGE UP (ref 0–0.5)
EOSINOPHIL NFR BLD AUTO: 2.6 % — SIGNIFICANT CHANGE UP (ref 0–6)
GLUCOSE SERPL-MCNC: 95 MG/DL — SIGNIFICANT CHANGE UP (ref 70–99)
HCT VFR BLD CALC: 30 % — LOW (ref 34.5–45)
HGB BLD-MCNC: 9.9 G/DL — LOW (ref 11.5–15.5)
IMM GRANULOCYTES NFR BLD AUTO: 1.2 % — SIGNIFICANT CHANGE UP (ref 0–1.5)
LYMPHOCYTES # BLD AUTO: 1.62 K/UL — SIGNIFICANT CHANGE UP (ref 1–3.3)
LYMPHOCYTES # BLD AUTO: 16.1 % — SIGNIFICANT CHANGE UP (ref 13–44)
MCHC RBC-ENTMCNC: 30.3 PG — SIGNIFICANT CHANGE UP (ref 27–34)
MCHC RBC-ENTMCNC: 33 GM/DL — SIGNIFICANT CHANGE UP (ref 32–36)
MCV RBC AUTO: 91.7 FL — SIGNIFICANT CHANGE UP (ref 80–100)
MONOCYTES # BLD AUTO: 0.73 K/UL — SIGNIFICANT CHANGE UP (ref 0–0.9)
MONOCYTES NFR BLD AUTO: 7.2 % — SIGNIFICANT CHANGE UP (ref 2–14)
NEUTROPHILS # BLD AUTO: 7.33 K/UL — SIGNIFICANT CHANGE UP (ref 1.8–7.4)
NEUTROPHILS NFR BLD AUTO: 72.6 % — SIGNIFICANT CHANGE UP (ref 43–77)
NRBC # BLD: 0 /100 WBCS — SIGNIFICANT CHANGE UP (ref 0–0)
PLATELET # BLD AUTO: 564 K/UL — HIGH (ref 150–400)
POTASSIUM SERPL-MCNC: 3.7 MMOL/L — SIGNIFICANT CHANGE UP (ref 3.5–5.3)
POTASSIUM SERPL-SCNC: 3.7 MMOL/L — SIGNIFICANT CHANGE UP (ref 3.5–5.3)
RBC # BLD: 3.27 M/UL — LOW (ref 3.8–5.2)
RBC # FLD: 13.9 % — SIGNIFICANT CHANGE UP (ref 10.3–14.5)
SODIUM SERPL-SCNC: 141 MMOL/L — SIGNIFICANT CHANGE UP (ref 135–145)
WBC # BLD: 10.09 K/UL — SIGNIFICANT CHANGE UP (ref 3.8–10.5)
WBC # FLD AUTO: 10.09 K/UL — SIGNIFICANT CHANGE UP (ref 3.8–10.5)

## 2020-12-04 PROCEDURE — 87205 SMEAR GRAM STAIN: CPT

## 2020-12-04 PROCEDURE — 71045 X-RAY EXAM CHEST 1 VIEW: CPT

## 2020-12-04 PROCEDURE — 80053 COMPREHEN METABOLIC PANEL: CPT

## 2020-12-04 PROCEDURE — 82947 ASSAY GLUCOSE BLOOD QUANT: CPT

## 2020-12-04 PROCEDURE — 85014 HEMATOCRIT: CPT

## 2020-12-04 PROCEDURE — 74183 MRI ABD W/O CNTR FLWD CNTR: CPT

## 2020-12-04 PROCEDURE — U0003: CPT

## 2020-12-04 PROCEDURE — 85730 THROMBOPLASTIN TIME PARTIAL: CPT

## 2020-12-04 PROCEDURE — 86850 RBC ANTIBODY SCREEN: CPT

## 2020-12-04 PROCEDURE — 99232 SBSQ HOSP IP/OBS MODERATE 35: CPT

## 2020-12-04 PROCEDURE — 83605 ASSAY OF LACTIC ACID: CPT

## 2020-12-04 PROCEDURE — 72197 MRI PELVIS W/O & W/DYE: CPT

## 2020-12-04 PROCEDURE — 87040 BLOOD CULTURE FOR BACTERIA: CPT

## 2020-12-04 PROCEDURE — 80202 ASSAY OF VANCOMYCIN: CPT

## 2020-12-04 PROCEDURE — 87086 URINE CULTURE/COLONY COUNT: CPT

## 2020-12-04 PROCEDURE — 96374 THER/PROPH/DIAG INJ IV PUSH: CPT

## 2020-12-04 PROCEDURE — 93005 ELECTROCARDIOGRAM TRACING: CPT

## 2020-12-04 PROCEDURE — 84132 ASSAY OF SERUM POTASSIUM: CPT

## 2020-12-04 PROCEDURE — 93970 EXTREMITY STUDY: CPT

## 2020-12-04 PROCEDURE — C1729: CPT

## 2020-12-04 PROCEDURE — C1894: CPT

## 2020-12-04 PROCEDURE — 96375 TX/PRO/DX INJ NEW DRUG ADDON: CPT

## 2020-12-04 PROCEDURE — 82803 BLOOD GASES ANY COMBINATION: CPT

## 2020-12-04 PROCEDURE — 85610 PROTHROMBIN TIME: CPT

## 2020-12-04 PROCEDURE — 86901 BLOOD TYPING SEROLOGIC RH(D): CPT

## 2020-12-04 PROCEDURE — 81003 URINALYSIS AUTO W/O SCOPE: CPT

## 2020-12-04 PROCEDURE — 87389 HIV-1 AG W/HIV-1&-2 AB AG IA: CPT

## 2020-12-04 PROCEDURE — 82435 ASSAY OF BLOOD CHLORIDE: CPT

## 2020-12-04 PROCEDURE — 87075 CULTR BACTERIA EXCEPT BLOOD: CPT

## 2020-12-04 PROCEDURE — 81025 URINE PREGNANCY TEST: CPT

## 2020-12-04 PROCEDURE — 74177 CT ABD & PELVIS W/CONTRAST: CPT

## 2020-12-04 PROCEDURE — 96376 TX/PRO/DX INJ SAME DRUG ADON: CPT

## 2020-12-04 PROCEDURE — 84295 ASSAY OF SERUM SODIUM: CPT

## 2020-12-04 PROCEDURE — 86769 SARS-COV-2 COVID-19 ANTIBODY: CPT

## 2020-12-04 PROCEDURE — 99285 EMERGENCY DEPT VISIT HI MDM: CPT | Mod: 25

## 2020-12-04 PROCEDURE — 85384 FIBRINOGEN ACTIVITY: CPT

## 2020-12-04 PROCEDURE — 85025 COMPLETE CBC W/AUTO DIFF WBC: CPT

## 2020-12-04 PROCEDURE — 87070 CULTURE OTHR SPECIMN AEROBIC: CPT

## 2020-12-04 PROCEDURE — 87635 SARS-COV-2 COVID-19 AMP PRB: CPT

## 2020-12-04 PROCEDURE — 76942 ECHO GUIDE FOR BIOPSY: CPT

## 2020-12-04 PROCEDURE — 80048 BASIC METABOLIC PNL TOTAL CA: CPT

## 2020-12-04 PROCEDURE — 85018 HEMOGLOBIN: CPT

## 2020-12-04 PROCEDURE — 82330 ASSAY OF CALCIUM: CPT

## 2020-12-04 PROCEDURE — 10160 PNXR ASPIR ABSC HMTMA BULLA: CPT

## 2020-12-04 PROCEDURE — 86900 BLOOD TYPING SEROLOGIC ABO: CPT

## 2020-12-04 PROCEDURE — A9585: CPT

## 2020-12-04 RX ORDER — ASCORBIC ACID 60 MG
1 TABLET,CHEWABLE ORAL
Qty: 0 | Refills: 0 | DISCHARGE
Start: 2020-12-04

## 2020-12-04 RX ADMIN — SIMETHICONE 80 MILLIGRAM(S): 80 TABLET, CHEWABLE ORAL at 06:30

## 2020-12-04 RX ADMIN — Medication 325 MILLIGRAM(S): at 12:04

## 2020-12-04 RX ADMIN — Medication 500 MILLIGRAM(S): at 12:04

## 2020-12-04 RX ADMIN — SODIUM CHLORIDE 3 MILLILITER(S): 9 INJECTION INTRAMUSCULAR; INTRAVENOUS; SUBCUTANEOUS at 06:32

## 2020-12-04 RX ADMIN — HEPARIN SODIUM 5000 UNIT(S): 5000 INJECTION INTRAVENOUS; SUBCUTANEOUS at 10:59

## 2020-12-04 RX ADMIN — Medication 1 TABLET(S): at 06:30

## 2020-12-04 RX ADMIN — Medication 1 TABLET(S): at 12:04

## 2020-12-04 NOTE — PROGRESS NOTE ADULT - PROBLEM SELECTOR PLAN 1
#Post-operative Fever    Plan:   CV: Cont to monitor for tachycardia. likely 2/2 infectious process. Now resolved.  Resp: cont IS  GI: Reg diet, lactobacillus daily. Diarrhea resolved. Transaminitis resolved.   : Voiding without difficulty. Pt with SUNDAR, improving. SLIV. Encourage PO hydration.   ID: on Augmentin (12/3- ), s/p Zosyn (11/28 -12/3) and Vanc (11/28-12/2). ID following. HIV neg. Final BCx (11/28) negative. F/u Drain fluid Cx (11/30). UCx (11/28): Nl jose.   DVT: cont HSQ for DVT ppx.   Dispo: If patient remains afebrile on PO Antibiotics, possibly home today.     Sybil Han, PGY-1

## 2020-12-04 NOTE — PROGRESS NOTE ADULT - ASSESSMENT
30 f s/p myomectomy 11/23 c/b large pelvic hematoma, discharged 11/26 and came back 11/27 with fever, chills, diaphoresis  here persistently febrile, WBC: 12.9  blood and urine cx negative  abd/pelvis CT: No significant change in large pelvic hematoma displacing the uterus left of midline. No new site of bleeding or progressive mass effect. Superinfected hematoma cannot be excluded given extensive surrounding fat stranding and blood products.  pelvic MRI with Hematoma in the pelvis immediately adjacent to the uterine defects measuring 13.5 x 12.3 x 9.6 cm. No large amount of air within the collection  s/p IR drainage 11/30 but no drain was placed    fever, leukocytosis, sepsis after myomectomy with large pelvic hematoma next to the uterus wall defect, ? infected   s/p IR drainage 11/30 with no drain, cx negative and now pt afebrile for 48h, not sure if the fever was due to hematoma or it was infected and the cx is negative because it was done on antibiotics but now afebrile  diarrhea likely antibiotic induced, not watery so C-diff could not be sent  * s/p vanco 11/28-12/2, and zosyn 12/28-12/3  * switched to augmentin 875 bid 12/3 and afebrile, doing well  * did not repeat a CT with contrast in view of slight SUNDAR, will repeat one in a week  * f/u with ID and GYN    The above assessment and plan was discussed with GYN    Mare Dominguez MD  Pager [phone]  After 5pm and on weekends call [phone]

## 2020-12-04 NOTE — PROGRESS NOTE ADULT - SUBJECTIVE AND OBJECTIVE BOX
GYN Progress Note    POD#11   HD#7    Patient seen and examined at bedside by GYN team.  No acute events overnight. No acute concerns, no diarrhea.  Pain well controlled.  Patient is ambulating and tolerating reg diet.  Patient is passing flatus.   Patient is voiding spontaneously.   Denies CP, SOB, N/V, fevers, and chills.    Vital Signs Last 24 Hours  T(C): 37.1 (12-04-20 @ 05:54), Max: 37.7 (12-03-20 @ 17:01)  HR: 83 (12-04-20 @ 05:54) (83 - 95)  BP: 115/78 (12-04-20 @ 05:54) (115/78 - 134/92)  RR: 18 (12-04-20 @ 05:54) (18 - 18)  SpO2: 96% (12-04-20 @ 05:54) (94% - 99%)    I&O's Summary      Physical Exam:  General: NAD  CV: RRR  Lungs: CTAB  Abdomen: soft, non-tender, non-distended, normoactive bowel sounds  Incision: midline incision CDI  : minimal bleeding on pad  Ext: no pain or swelling     Labs:                        9.5    8.71  )-----------( 468      ( 03 Dec 2020 07:08 )             29.0   baso 0.2    eos 1.8    imm gran 1.0    lymph 16.5   mono 7.3    poly 73.2                         9.9    7.70  )-----------( 435      ( 02 Dec 2020 06:08 )             30.8   baso 0.3    eos 2.7    imm gran 0.9    lymph 17.3   mono 8.7    poly 70.1                         10.0   7.84  )-----------( 398      ( 01 Dec 2020 07:08 )             30.5   baso 0.5    eos 2.8    imm gran 1.0    lymph 18.4   mono 12.6   poly 64.7                9.5<L>  8.71  )-----------( 468<H>    ( 12-03 @ 07:08 )             29.0<L>               9.9<L>  7.70  )-----------( 435<H>    ( 12-02 @ 06:08 )             30.8<L>               10.0<L>  7.84  )-----------( 398      ( 12-01 @ 07:08 )             30.5<L>               10.0<L>  10.30 )-----------( 347      ( 11-30 @ 07:16 )             31.1<L>               10.3<L>  12.95<H> )-----------( 289      ( 11-29 @ 07:17 )             30.8<L>      12-03    139  |  105  |  7   ----------------------------<  107<H>  3.5   |  22  |  1.21    Ca    9.1      03 Dec 2020 07:08    TPro  6.2  /  Alb  3.2<L>  /  TBili  0.5  /  DBili  x   /  AST  29  /  ALT  40  /  AlkPhos  139<H>  12-03              MEDICATIONS  (STANDING):  amoxicillin  875 milliGRAM(s)/clavulanate 1 Tablet(s) Oral two times a day  ascorbic acid 500 milliGRAM(s) Oral daily  ferrous    sulfate 325 milliGRAM(s) Oral daily  heparin   Injectable 5000 Unit(s) SubCutaneous every 12 hours  influenza   Vaccine 0.5 milliLiter(s) IntraMuscular once  lactobacillus acidophilus 1 Tablet(s) Oral daily  simethicone 80 milliGRAM(s) Chew every 6 hours  sodium chloride 0.9% lock flush 3 milliLiter(s) IV Push every 8 hours    MEDICATIONS  (PRN):  ibuprofen  Tablet. 600 milliGRAM(s) Oral every 6 hours PRN Moderate Pain (4 - 6)  traMADol 25 milliGRAM(s) Oral every 6 hours PRN Severe Pain (7 - 10)

## 2020-12-04 NOTE — PROGRESS NOTE ADULT - ATTENDING COMMENTS
Patient seen and examined, agree with above. Pt asymptomatic and afebrile, WBC normal, doing well on po Augmentin. Plan for d/c on Augmentin and to repeat CT outpatient to assess for interval improvement in hematoma.    Domingo Middleton MD Patient seen and examined, agree with above. Pt asymptomatic and afebrile, WBC normal, doing well on po Augmentin. Plan for d/c on Augmentin and to repeat CT outpatient to assess for interval improvement in hematoma.    Domingo Middleton MD    GYN Attending Note    pt seen and examined at 2pm. doing well today. discussed dc planning. f/u in office 12/14. to have CT abd/pelvis performed per Dr. Dominguez outpatient. to continue augmentin. reviewed incision care and strict postop precautions. pt also to f/u with ID postop.    ANT Renee

## 2020-12-04 NOTE — PROGRESS NOTE ADULT - ASSESSMENT
29y/o F now POD#11 s/p abdominal myomectomy who was discharged home with stable hematoma admitted with postoperative fev. CTAP obtained in ED demonstrative of stable hematoma, inability to r/o infected hematoma given extensive fat stranding. Patient was admitted for treatment of possibly infected hematoma, no on Augmentin, s/p Vanc/Zosyn. Initially during her hospital stay the patient was persistently tachycardic and febrile while on antibiotics. Now her tachycardia is resolved and she is >48hrs afebrile with last documented fever to 38.7 (12/2 @ 1:30a).

## 2020-12-04 NOTE — PROGRESS NOTE ADULT - REASON FOR ADMISSION
postoperative fever

## 2020-12-04 NOTE — PROGRESS NOTE ADULT - SUBJECTIVE AND OBJECTIVE BOX
Follow Up:  fever, pelvic hematoma, ?abscess    Interval History: pt was switched to augmentin yesterday and feels well, afebrile    ROS:      All other systems negative    Constitutional: no more fever  Cardiovascular:  no chest pain, no palpitation  Respiratory:  no SOB, no cough  GI:  no abd pain, no vomiting, loose BMs  urinary: no dysuria, no hematuria, no flank pain  musculoskeletal:  no joint pain, no joint swelling  skin:  no rash  neurology:  no headache, no seizure        Allergies  Ceclor (Rash)        ANTIMICROBIALS:  amoxicillin  875 milliGRAM(s)/clavulanate 1 two times a day      OTHER MEDS:  ascorbic acid 500 milliGRAM(s) Oral daily  ferrous    sulfate 325 milliGRAM(s) Oral daily  heparin   Injectable 5000 Unit(s) SubCutaneous every 12 hours  ibuprofen  Tablet. 600 milliGRAM(s) Oral every 6 hours PRN  influenza   Vaccine 0.5 milliLiter(s) IntraMuscular once  lactobacillus acidophilus 1 Tablet(s) Oral daily  simethicone 80 milliGRAM(s) Chew every 6 hours  sodium chloride 0.9% lock flush 3 milliLiter(s) IV Push every 8 hours  traMADol 25 milliGRAM(s) Oral every 6 hours PRN      Vital Signs Last 24 Hrs  T(C): 36.7 (04 Dec 2020 09:34), Max: 37.7 (03 Dec 2020 17:01)  T(F): 98 (04 Dec 2020 09:34), Max: 99.9 (03 Dec 2020 17:01)  HR: 90 (04 Dec 2020 09:34) (83 - 95)  BP: 126/82 (04 Dec 2020 09:34) (115/78 - 134/92)  BP(mean): --  RR: 18 (04 Dec 2020 09:34) (18 - 18)  SpO2: 98% (04 Dec 2020 09:34) (94% - 99%)    Physical Exam:  General:    NAD,  non toxic  Cardio:     regular S1, S2,  no murmur  Respiratory:    clear b/l,    no wheezing  abd:     soft,   BS +,   no tenderness  :   no CVAT,  no suprapubic tenderness,   no  lovett  Musculoskeletal:   no joint swelling  vascular: no phlebitis  Skin:    no rash                          9.9    10.09 )-----------( 564      ( 04 Dec 2020 06:49 )             30.0       12-04    141  |  105  |  6<L>  ----------------------------<  95  3.7   |  23  |  1.04    Ca    9.2      04 Dec 2020 06:49    TPro  6.2  /  Alb  3.2<L>  /  TBili  0.5  /  DBili  x   /  AST  29  /  ALT  40  /  AlkPhos  139<H>  12-03          MICROBIOLOGY:  v  .Body Fluid hematoma  12-01-20   No growth  --    No polymorphonuclear cells seen per low power field  No organisms seen per oil power field      .Urine Clean Catch (Midstream)  11-28-20   <10,000 CFU/mL Normal Urogenital Elvira  --  --      .Blood Blood-Peripheral  11-28-20   No Growth Final  --  --                RADIOLOGY:  Images independently visualized and reviewed personally, findings as below  < from: IR Procedure (11.30.20 @ 15:35) >  Fluid collection aspiration  The patient was positioned supine. Initial imaging was performed. Local anesthesia was administered. The fluid collection was accessed using an access needle. Position within the fluid collection was confirmed, and fluid aspiration was performed. All instruments were then removed.  - Initial imaging findings: Moderate sized pelvic collection with complex material likely representing hematoma  - Aspiration needle/catheter: 5 Czech Yueh centesis needle  - Post-aspiration imaging findings: No significant change    < end of copied text >  < from: MR Pelvis w/wo IV Cont (11.29.20 @ 23:08) >  UTERUS: 13.8 x 4.0 x 5.7 cm. Endometrium and junctional zone are normal in thickness. Intrauterine device in good position. Status post myomectomy with a postsurgical defect in the right lateral wall of the uterus. Hematoma in the pelvis immediately adjacent to the uterine defects measuring 13.5 x 12.3 x 9.6 cm. No large amount of air within the collection.    LEFT OVARY: Normal in appearance.  RIGHT OVARY: Normal in appearance.    BOWEL: No bowel obstruction.  PERITONEUM: Trace upper abdominal ascites. Large pelvic hematoma as above.  VESSELS: Within normal limits.  RETROPERITONEUM/LYMPH NODES: No lymphadenopathy.  ABDOMINAL WALL: Postsurgical changes.  BONES: Within normal limits.    IMPRESSION:  Status post myomectomy with a postsurgical defect in the right lateral wall of the uterus.    Large pelvic hematoma as above.    < end of copied text >

## 2020-12-05 LAB
CULTURE RESULTS: NO GROWTH — SIGNIFICANT CHANGE UP
SPECIMEN SOURCE: SIGNIFICANT CHANGE UP

## 2020-12-10 ENCOUNTER — FORM ENCOUNTER (OUTPATIENT)
Age: 30
End: 2020-12-10

## 2020-12-11 PROBLEM — D25.9 LEIOMYOMA OF UTERUS, UNSPECIFIED: Chronic | Status: ACTIVE | Noted: 2020-11-29

## 2020-12-13 ENCOUNTER — FORM ENCOUNTER (OUTPATIENT)
Age: 30
End: 2020-12-13

## 2020-12-13 LAB — SURGICAL PATHOLOGY STUDY: SIGNIFICANT CHANGE UP

## 2020-12-14 ENCOUNTER — APPOINTMENT (OUTPATIENT)
Dept: OBGYN | Facility: CLINIC | Age: 30
End: 2020-12-14

## 2020-12-16 ENCOUNTER — OUTPATIENT (OUTPATIENT)
Dept: OUTPATIENT SERVICES | Facility: HOSPITAL | Age: 30
LOS: 1 days | End: 2020-12-16
Payer: COMMERCIAL

## 2020-12-16 ENCOUNTER — APPOINTMENT (OUTPATIENT)
Dept: CT IMAGING | Facility: CLINIC | Age: 30
End: 2020-12-16
Payer: COMMERCIAL

## 2020-12-16 ENCOUNTER — RESULT REVIEW (OUTPATIENT)
Age: 30
End: 2020-12-16

## 2020-12-16 DIAGNOSIS — Z00.8 ENCOUNTER FOR OTHER GENERAL EXAMINATION: ICD-10-CM

## 2020-12-16 DIAGNOSIS — Z98.890 OTHER SPECIFIED POSTPROCEDURAL STATES: Chronic | ICD-10-CM

## 2020-12-16 DIAGNOSIS — N73.9 FEMALE PELVIC INFLAMMATORY DISEASE, UNSPECIFIED: ICD-10-CM

## 2020-12-16 PROCEDURE — 74177 CT ABD & PELVIS W/CONTRAST: CPT

## 2020-12-16 PROCEDURE — 74177 CT ABD & PELVIS W/CONTRAST: CPT | Mod: 26

## 2020-12-23 PROBLEM — N39.0 POSTCOITAL UTI: Status: RESOLVED | Noted: 2020-01-21 | Resolved: 2020-12-23

## 2020-12-28 ENCOUNTER — APPOINTMENT (OUTPATIENT)
Dept: INFECTIOUS DISEASE | Facility: CLINIC | Age: 30
End: 2020-12-28
Payer: COMMERCIAL

## 2020-12-28 VITALS
BODY MASS INDEX: 35.17 KG/M2 | SYSTOLIC BLOOD PRESSURE: 117 MMHG | OXYGEN SATURATION: 97 % | RESPIRATION RATE: 14 BRPM | WEIGHT: 206 LBS | DIASTOLIC BLOOD PRESSURE: 81 MMHG | HEIGHT: 64 IN | HEART RATE: 88 BPM | TEMPERATURE: 98 F

## 2020-12-28 DIAGNOSIS — R50.9 FEVER, UNSPECIFIED: ICD-10-CM

## 2020-12-28 PROCEDURE — 99214 OFFICE O/P EST MOD 30 MIN: CPT

## 2020-12-28 PROCEDURE — 99072 ADDL SUPL MATRL&STAF TM PHE: CPT

## 2020-12-28 NOTE — HISTORY OF PRESENT ILLNESS
[FreeTextEntry1] : 30 f with recent hospitalization for  myomectomy 11/23/20 c/b large pelvic hematoma, discharged 11/26 and came back 11/27 with fever, chills, diaphoresis, was persistently febrile, WBC: 12.9\par blood and urine cx negative\par abd/pelvis CT: No significant change in large pelvic hematoma displacing the uterus left of midline. No new site of bleeding or progressive mass effect. Superinfected hematoma cannot be excluded given extensive surrounding fat stranding and blood products.\par pelvic MRI with Hematoma in the pelvis immediately adjacent to the uterine defects measuring 13.5 x 12.3 x 9.6 cm. No large amount of air within the collection\par s/p IR drainage 11/30 but no drain was placed and the culture was negative, fever resolved\par s/p vanco 11/28-12/2, and zosyn 12/28-12/3\par switched to augmentin 875 bid 12/3 and discharged to f/u closely, now has been off antibiotics for 2 weeks, no fever, chills or abd pain, repeat CT 12/16 with unchanged hematoma\par

## 2020-12-28 NOTE — REVIEW OF SYSTEMS
[Fever] : no fever [Chills] : no chills [Body Aches] : no body aches [Eye Pain] : no eye pain [Red Eyes] : eyes not red [Earache] : no earache [Loss Of Hearing] : no hearing loss [Chest Pain] : no chest pain [Palpitations] : no palpitations [Shortness Of Breath] : no shortness of breath [Wheezing] : no wheezing [SOB on Exertion] : no shortness of breath during exertion [Abdominal Pain] : no abdominal pain [Vomiting] : no vomiting [Diarrhea] : no diarrhea [Dysuria] : no dysuria [Pelvic Pain] : no pelvic pain [Joint Pain] : no joint pain [Skin Lesions] : no skin lesions [Confused] : no confusion [Convulsions] : no convulsions [Suicidal] : not suicidal [Anxiety] : no anxiety [Easy Bleeding] : no tendency for easy bleeding [Easy Bruising] : no tendency for easy bruising [Negative] : Heme/Lymph

## 2020-12-28 NOTE — ASSESSMENT
[FreeTextEntry1] : 30 f with recent hospitalization for  myomectomy 11/23/20 c/b large pelvic hematoma, discharged 11/26 and came back 11/27 with fever, chills, diaphoresis, was persistently febrile, WBC: 12.9\par blood and urine cx negative\par abd/pelvis CT: No significant change in large pelvic hematoma displacing the uterus left of midline. No new site of bleeding or progressive mass effect. Superinfected hematoma cannot be excluded given extensive surrounding fat stranding and blood products.\par pelvic MRI with Hematoma in the pelvis immediately adjacent to the uterine defects measuring 13.5 x 12.3 x 9.6 cm. No large amount of air within the collection\par s/p IR drainage 11/30 but no drain was placed and the culture was negative, fever resolved\par s/p vanco 11/28-12/2, and zosyn 12/28-12/3\par switched to augmentin 875 bid 12/3 and discharged to f/u closely, now has been off antibiotics for 2 weeks, no fever, chills or abd pain, repeat CT 12/16 with unchanged hematoma\par \par fever, leukocytosis, sepsis after myomectomy with large pelvic hematoma next to the uterus wall defect\par s/p IR drainage 11/30 with no drain, cx negative and now pt afebrile for 48h, not sure if the fever was due to hematoma or it was infected and the cx is negative because it was done on antibiotics or just fever due to hematoma\par \par * now has been off antibiotics for 2 weeks and doing well, no fever, repeat CT 12/16 with unchanged hematoma\par * will repeat CBC, CMP\par * f/u with GYN\par * if fever or change in the status, pt will return\par \par

## 2020-12-28 NOTE — PHYSICAL EXAM
[General Appearance - Alert] : alert [General Appearance - In No Acute Distress] : in no acute distress [Sclera] : the sclera and conjunctiva were normal [Outer Ear] : the ears and nose were normal in appearance [Neck Appearance] : the appearance of the neck was normal [] : no respiratory distress [Auscultation Breath Sounds / Voice Sounds] : lungs were clear to auscultation bilaterally [Heart Sounds] : normal S1 and S2 [Edema] : there was no peripheral edema [Bowel Sounds] : normal bowel sounds [Abdomen Soft] : soft [Abdomen Tenderness] : non-tender [Costovertebral Angle Tenderness] : no CVA tenderness [FreeTextEntry1] : surgical incision well healed  [No Palpable Adenopathy] : no palpable adenopathy [Musculoskeletal - Swelling] : no joint swelling [No Focal Deficits] : no focal deficits [Affect] : the affect was normal

## 2020-12-29 LAB
ALBUMIN SERPL ELPH-MCNC: 4.4 G/DL
ALP BLD-CCNC: 119 U/L
ALT SERPL-CCNC: 24 U/L
ANION GAP SERPL CALC-SCNC: 11 MMOL/L
AST SERPL-CCNC: 19 U/L
BASOPHILS # BLD AUTO: 0.03 K/UL
BASOPHILS NFR BLD AUTO: 0.4 %
BILIRUB SERPL-MCNC: 0.5 MG/DL
BUN SERPL-MCNC: 12 MG/DL
CALCIUM SERPL-MCNC: 10.4 MG/DL
CHLORIDE SERPL-SCNC: 105 MMOL/L
CO2 SERPL-SCNC: 24 MMOL/L
CREAT SERPL-MCNC: 0.94 MG/DL
EOSINOPHIL # BLD AUTO: 0.27 K/UL
EOSINOPHIL NFR BLD AUTO: 3.6 %
GLUCOSE SERPL-MCNC: 89 MG/DL
HCT VFR BLD CALC: 44.1 %
HGB BLD-MCNC: 13.9 G/DL
IMM GRANULOCYTES NFR BLD AUTO: 0.4 %
LYMPHOCYTES # BLD AUTO: 1.88 K/UL
LYMPHOCYTES NFR BLD AUTO: 25.4 %
MAN DIFF?: NORMAL
MCHC RBC-ENTMCNC: 29.8 PG
MCHC RBC-ENTMCNC: 31.5 GM/DL
MCV RBC AUTO: 94.4 FL
MONOCYTES # BLD AUTO: 0.58 K/UL
MONOCYTES NFR BLD AUTO: 7.8 %
NEUTROPHILS # BLD AUTO: 4.62 K/UL
NEUTROPHILS NFR BLD AUTO: 62.4 %
PLATELET # BLD AUTO: 296 K/UL
POTASSIUM SERPL-SCNC: 4.4 MMOL/L
PROT SERPL-MCNC: 7.6 G/DL
RAPID RVP RESULT: NOT DETECTED
RBC # BLD: 4.67 M/UL
RBC # FLD: 13.8 %
SARS-COV-2 RNA PNL RESP NAA+PROBE: NOT DETECTED
SODIUM SERPL-SCNC: 139 MMOL/L
WBC # FLD AUTO: 7.41 K/UL

## 2021-01-04 ENCOUNTER — FORM ENCOUNTER (OUTPATIENT)
Age: 31
End: 2021-01-04

## 2021-02-03 ENCOUNTER — APPOINTMENT (OUTPATIENT)
Dept: OBGYN | Facility: CLINIC | Age: 31
End: 2021-02-03
Payer: COMMERCIAL

## 2021-02-03 PROCEDURE — 58301 REMOVE INTRAUTERINE DEVICE: CPT | Mod: 52,79

## 2021-02-03 PROCEDURE — 99072 ADDL SUPL MATRL&STAF TM PHE: CPT

## 2021-02-04 ENCOUNTER — FORM ENCOUNTER (OUTPATIENT)
Age: 31
End: 2021-02-04

## 2021-03-09 ENCOUNTER — LABORATORY RESULT (OUTPATIENT)
Age: 31
End: 2021-03-09

## 2021-03-09 ENCOUNTER — APPOINTMENT (OUTPATIENT)
Dept: DISASTER EMERGENCY | Facility: CLINIC | Age: 31
End: 2021-03-09

## 2021-03-12 ENCOUNTER — OUTPATIENT (OUTPATIENT)
Dept: OUTPATIENT SERVICES | Facility: HOSPITAL | Age: 31
LOS: 1 days | End: 2021-03-12
Payer: COMMERCIAL

## 2021-03-12 ENCOUNTER — APPOINTMENT (OUTPATIENT)
Dept: SLEEP CENTER | Facility: CLINIC | Age: 31
End: 2021-03-12
Payer: COMMERCIAL

## 2021-03-12 DIAGNOSIS — Z98.890 OTHER SPECIFIED POSTPROCEDURAL STATES: Chronic | ICD-10-CM

## 2021-03-12 PROCEDURE — 95810 POLYSOM 6/> YRS 4/> PARAM: CPT

## 2021-03-12 PROCEDURE — 95810 POLYSOM 6/> YRS 4/> PARAM: CPT | Mod: 26

## 2021-03-15 DIAGNOSIS — G47.33 OBSTRUCTIVE SLEEP APNEA (ADULT) (PEDIATRIC): ICD-10-CM

## 2021-03-23 ENCOUNTER — NON-APPOINTMENT (OUTPATIENT)
Age: 31
End: 2021-03-23

## 2021-04-01 ENCOUNTER — FORM ENCOUNTER (OUTPATIENT)
Age: 31
End: 2021-04-01

## 2021-04-13 NOTE — DISCHARGE NOTE PROVIDER - NSDCMRMEDTOKEN_GEN_ALL_CORE_FT
no home med:    Opt out acetaminophen 325 mg oral tablet: 3 tab(s) orally every 6 hours  Bactrim  mg-160 mg oral tablet: 1 tab(s) orally every 12 hours   Colace 100 mg oral capsule: 1 cap(s) orally once a day, As Needed   ferrous sulfate 325 mg (65 mg elemental iron) oral delayed release tablet: 1 tab(s) orally 3 times a day   ibuprofen 600 mg oral tablet: 1 tab(s) orally every 6 hours  oxyCODONE 5 mg oral tablet: 1 tab(s) orally every 6 hours MDD:4 tablets

## 2021-05-01 ENCOUNTER — RESULT REVIEW (OUTPATIENT)
Age: 31
End: 2021-05-01

## 2021-05-01 ENCOUNTER — APPOINTMENT (OUTPATIENT)
Dept: ULTRASOUND IMAGING | Facility: CLINIC | Age: 31
End: 2021-05-01
Payer: COMMERCIAL

## 2021-05-01 ENCOUNTER — OUTPATIENT (OUTPATIENT)
Dept: OUTPATIENT SERVICES | Facility: HOSPITAL | Age: 31
LOS: 1 days | End: 2021-05-01
Payer: COMMERCIAL

## 2021-05-01 DIAGNOSIS — Z98.890 OTHER SPECIFIED POSTPROCEDURAL STATES: Chronic | ICD-10-CM

## 2021-05-01 DIAGNOSIS — Z00.8 ENCOUNTER FOR OTHER GENERAL EXAMINATION: ICD-10-CM

## 2021-05-01 PROCEDURE — 76856 US EXAM PELVIC COMPLETE: CPT | Mod: 26,59

## 2021-05-01 PROCEDURE — 76830 TRANSVAGINAL US NON-OB: CPT | Mod: 26

## 2021-05-01 PROCEDURE — 76830 TRANSVAGINAL US NON-OB: CPT

## 2021-05-01 PROCEDURE — 76856 US EXAM PELVIC COMPLETE: CPT

## 2021-05-05 ENCOUNTER — FORM ENCOUNTER (OUTPATIENT)
Age: 31
End: 2021-05-05

## 2021-05-05 ENCOUNTER — RESULT REVIEW (OUTPATIENT)
Age: 31
End: 2021-05-05

## 2021-05-06 ENCOUNTER — APPOINTMENT (OUTPATIENT)
Dept: OBGYN | Facility: CLINIC | Age: 31
End: 2021-05-06
Payer: COMMERCIAL

## 2021-05-06 ENCOUNTER — FORM ENCOUNTER (OUTPATIENT)
Age: 31
End: 2021-05-06

## 2021-05-06 PROCEDURE — 99395 PREV VISIT EST AGE 18-39: CPT | Mod: 25

## 2021-05-06 PROCEDURE — 99072 ADDL SUPL MATRL&STAF TM PHE: CPT

## 2021-05-06 PROCEDURE — 58301 REMOVE INTRAUTERINE DEVICE: CPT

## 2021-05-09 ENCOUNTER — FORM ENCOUNTER (OUTPATIENT)
Age: 31
End: 2021-05-09

## 2021-05-16 ENCOUNTER — FORM ENCOUNTER (OUTPATIENT)
Age: 31
End: 2021-05-16

## 2021-06-03 ENCOUNTER — FORM ENCOUNTER (OUTPATIENT)
Age: 31
End: 2021-06-03

## 2021-08-15 NOTE — ASSESSMENT
[FreeTextEntry1] : The patient is a 30 year old woman with a history of erythrocytosis presenting for evaluation for CAITLYN as possible cause of erythrocytosis.  She notes that she snores but denies frequent awakenings.  She feels refreshed during the day with an ESS of 4 on today's visit.  Her BMI is 38.28 kg/m2.  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  The patient was referred to the [facility] for diagnostic polysomnography for further assessment. The ramifications of obstructive sleep apnea and its potential therapeutic modalities were discussed with the patient. The dangers of drowsy driving were discussed with the patient.  The patient was warned to avoid drowsy driving. The patient will followup after results of this study are available.\par  \par Toni Gotti DO\par Pulmonary, Critical Care and Sleep Medicine Attending 
Name band;

## 2022-01-24 DIAGNOSIS — Z12.72 ENCOUNTER FOR SCREENING FOR MALIGNANT NEOPLASM OF VAGINA: ICD-10-CM

## 2022-01-24 DIAGNOSIS — T14.8XXA OTHER INJURY OF UNSPECIFIED BODY REGION, INITIAL ENCOUNTER: ICD-10-CM

## 2022-01-24 DIAGNOSIS — Z83.3 FAMILY HISTORY OF DIABETES MELLITUS: ICD-10-CM

## 2022-01-24 DIAGNOSIS — Z86.018 PERSONAL HISTORY OF OTHER BENIGN NEOPLASM: ICD-10-CM

## 2022-01-24 DIAGNOSIS — Z92.89 PERSONAL HISTORY OF OTHER MEDICAL TREATMENT: ICD-10-CM

## 2022-01-24 DIAGNOSIS — Z80.3 FAMILY HISTORY OF MALIGNANT NEOPLASM OF BREAST: ICD-10-CM

## 2022-01-24 DIAGNOSIS — Z78.9 OTHER SPECIFIED HEALTH STATUS: ICD-10-CM

## 2022-01-24 DIAGNOSIS — Z80.8 FAMILY HISTORY OF MALIGNANT NEOPLASM OF OTHER ORGANS OR SYSTEMS: ICD-10-CM

## 2022-05-16 ENCOUNTER — APPOINTMENT (OUTPATIENT)
Dept: OBGYN | Facility: CLINIC | Age: 32
End: 2022-05-16
Payer: COMMERCIAL

## 2022-05-16 ENCOUNTER — LABORATORY RESULT (OUTPATIENT)
Age: 32
End: 2022-05-16

## 2022-05-16 VITALS
WEIGHT: 217 LBS | HEIGHT: 64 IN | SYSTOLIC BLOOD PRESSURE: 124 MMHG | DIASTOLIC BLOOD PRESSURE: 83 MMHG | BODY MASS INDEX: 37.05 KG/M2

## 2022-05-16 DIAGNOSIS — Z11.3 ENCOUNTER FOR SCREENING FOR INFECTIONS WITH A PREDOMINANTLY SEXUAL MODE OF TRANSMISSION: ICD-10-CM

## 2022-05-16 DIAGNOSIS — Z01.419 ENCOUNTER FOR GYNECOLOGICAL EXAMINATION (GENERAL) (ROUTINE) W/OUT ABNORMAL FINDINGS: ICD-10-CM

## 2022-05-16 DIAGNOSIS — Z31.41 ENCOUNTER FOR FERTILITY TESTING: ICD-10-CM

## 2022-05-16 PROCEDURE — 99395 PREV VISIT EST AGE 18-39: CPT

## 2022-05-25 ENCOUNTER — TRANSCRIPTION ENCOUNTER (OUTPATIENT)
Age: 32
End: 2022-05-25

## 2022-05-25 LAB
C TRACH RRNA SPEC QL NAA+PROBE: NOT DETECTED
HPV HIGH+LOW RISK DNA PNL CVX: DETECTED
N GONORRHOEA RRNA SPEC QL NAA+PROBE: NOT DETECTED
SOURCE TP AMPLIFICATION: NORMAL

## 2022-05-25 RX ORDER — NITROFURANTOIN MACROCRYSTALS 50 MG/1
50 CAPSULE ORAL AS DIRECTED
Qty: 30 | Refills: 5 | Status: ACTIVE | COMMUNITY
Start: 2022-05-25 | End: 1900-01-01

## 2022-05-25 NOTE — PLAN
[FreeTextEntry1] : 31 year old female presents for annual\par \par -PAP/HPV with GCC done today\par -Rx for pelvic sono, fu fibroids\par -Rx day 3 labs and AMH\par -Referral for egg freezing\par -Rx for Macrobid Prophylactic\par \par RTO in 1 year

## 2022-05-25 NOTE — HISTORY OF PRESENT ILLNESS
[HIV Test offered] : HIV Test offered [Syphilis test offered] : Syphilis test offered [Gonorrhea test offered] : Gonorrhea test offered [Chlamydia test offered] : Chlamydia test offered [Trichomonas test offered] : Trichomonas test offered [Hepatitis B test offered] : Hepatitis B test offered [HPV test offered] : HPV test offered [Hepatitis C test offered] : Hepatitis C test offered [FreeTextEntry1] : 31 year old female presents for an annual wellness check. Pt is doing well. States that her periods are 4-5 days, on her 2nd day it is it's most heavy with associated cramps. Reports to have had COVID, has her COVID vaccine but has not been boosted. \par \par GYNHx: fibroids\par \par SurgHx: D+C x1, open myomectomy(2020, c/b post-op hematoma)\par \par Allergies to Ceclor - hives (amox/pcn OK) [PapSmeardate] : 05/21

## 2023-05-24 ENCOUNTER — APPOINTMENT (OUTPATIENT)
Dept: OBGYN | Facility: CLINIC | Age: 33
End: 2023-05-24
Payer: COMMERCIAL

## 2023-05-24 VITALS
BODY MASS INDEX: 37.9 KG/M2 | DIASTOLIC BLOOD PRESSURE: 85 MMHG | HEIGHT: 64 IN | SYSTOLIC BLOOD PRESSURE: 134 MMHG | WEIGHT: 222 LBS

## 2023-05-24 DIAGNOSIS — L68.9 HYPERTRICHOSIS, UNSPECIFIED: ICD-10-CM

## 2023-05-24 DIAGNOSIS — Z11.51 ENCOUNTER FOR SCREENING FOR HUMAN PAPILLOMAVIRUS (HPV): ICD-10-CM

## 2023-05-24 PROCEDURE — 99395 PREV VISIT EST AGE 18-39: CPT

## 2023-05-30 LAB
17OHP SERPL-MCNC: 15 NG/DL
DHEA-S SERPL-MCNC: 192 UG/DL
ESTIMATED AVERAGE GLUCOSE: 111 MG/DL
ESTRADIOL SERPL-MCNC: 72 PG/ML
FSH SERPL-MCNC: 3.7 IU/L
HBA1C MFR BLD HPLC: 5.5 %
HPV HIGH+LOW RISK DNA PNL CVX: NOT DETECTED
LH SERPL-ACNC: 7.9 IU/L
PROLACTIN SERPL-MCNC: 42.9 NG/ML
TESTOST FREE SERPL-MCNC: 0.8 PG/ML
TESTOST SERPL-MCNC: 17.4 NG/DL
TSH SERPL-ACNC: 2.77 UIU/ML

## 2023-05-30 NOTE — HISTORY OF PRESENT ILLNESS
[FreeTextEntry1] : 33 yo female pt present for an annual wellness exam. She reports that her periods have been shorter, roughly 7 days, and they are light. In the last two days of her period, she deals with itching. Her most recent period was only two days long but she believes this is because of her antibiotics. \par \par She is no longer sexually active at this moment. She reports having a lot more facial hair growing and it is causing scarring. She reports that last summer to December 2022 she gained 20 lbs but she has lost another 13 lbs since. \par \par GYNHx: fibroids\par SurgHx: D+C x1, open myomectomy(2020, c/b post-op hematoma)\par Allergies to Ceclor - hives (amox/pcn OK) \par  [PapSmeardate] : 05/22 [TextBox_31] : HPV Alpha

## 2023-05-30 NOTE — PLAN
[FreeTextEntry1] : 33 yo female pt present for an annual wellness visit\par \par HCM\par -pap done today \par -blood drawn today for pcos eval \par -rx for pelvic sono\par -recommend daily probiotics\par -referral for a nutritionist and fertility specialist \par -rto 1 year \par

## 2023-05-31 ENCOUNTER — NON-APPOINTMENT (OUTPATIENT)
Age: 33
End: 2023-05-31

## 2023-05-31 DIAGNOSIS — N76.0 ACUTE VAGINITIS: ICD-10-CM

## 2023-05-31 DIAGNOSIS — B96.89 ACUTE VAGINITIS: ICD-10-CM

## 2023-05-31 DIAGNOSIS — B37.9 CANDIDIASIS, UNSPECIFIED: ICD-10-CM

## 2023-05-31 RX ORDER — FLUCONAZOLE 150 MG/1
150 TABLET ORAL
Qty: 1 | Refills: 0 | Status: ACTIVE | COMMUNITY
Start: 2023-05-31 | End: 1900-01-01

## 2023-05-31 RX ORDER — METRONIDAZOLE 7.5 MG/G
0.75 GEL VAGINAL
Qty: 1 | Refills: 0 | Status: ACTIVE | COMMUNITY
Start: 2023-05-31 | End: 1900-01-01

## 2023-06-14 ENCOUNTER — APPOINTMENT (OUTPATIENT)
Dept: OBGYN | Facility: CLINIC | Age: 33
End: 2023-06-14

## 2023-06-17 ENCOUNTER — APPOINTMENT (OUTPATIENT)
Dept: ULTRASOUND IMAGING | Facility: CLINIC | Age: 33
End: 2023-06-17
Payer: COMMERCIAL

## 2023-06-17 ENCOUNTER — OUTPATIENT (OUTPATIENT)
Dept: OUTPATIENT SERVICES | Facility: HOSPITAL | Age: 33
LOS: 1 days | End: 2023-06-17
Payer: SELF-PAY

## 2023-06-17 DIAGNOSIS — Z98.890 OTHER SPECIFIED POSTPROCEDURAL STATES: Chronic | ICD-10-CM

## 2023-06-17 DIAGNOSIS — D21.9 BENIGN NEOPLASM OF CONNECTIVE AND OTHER SOFT TISSUE, UNSPECIFIED: ICD-10-CM

## 2023-06-17 DIAGNOSIS — Z00.8 ENCOUNTER FOR OTHER GENERAL EXAMINATION: ICD-10-CM

## 2023-06-17 PROCEDURE — 76830 TRANSVAGINAL US NON-OB: CPT | Mod: 26

## 2023-06-17 PROCEDURE — 76856 US EXAM PELVIC COMPLETE: CPT

## 2023-06-17 PROCEDURE — 76856 US EXAM PELVIC COMPLETE: CPT | Mod: 26,59

## 2023-06-17 PROCEDURE — 76830 TRANSVAGINAL US NON-OB: CPT

## 2023-06-19 ENCOUNTER — APPOINTMENT (OUTPATIENT)
Dept: OBGYN | Facility: CLINIC | Age: 33
End: 2023-06-19
Payer: COMMERCIAL

## 2023-06-19 DIAGNOSIS — R79.89 OTHER SPECIFIED ABNORMAL FINDINGS OF BLOOD CHEMISTRY: ICD-10-CM

## 2023-06-19 PROCEDURE — 99211 OFF/OP EST MAY X REQ PHY/QHP: CPT

## 2023-08-08 ENCOUNTER — TRANSCRIPTION ENCOUNTER (OUTPATIENT)
Age: 33
End: 2023-08-08

## 2023-08-22 ENCOUNTER — APPOINTMENT (OUTPATIENT)
Dept: OBGYN | Facility: CLINIC | Age: 33
End: 2023-08-22
Payer: COMMERCIAL

## 2023-08-22 VITALS
SYSTOLIC BLOOD PRESSURE: 130 MMHG | HEART RATE: 73 BPM | RESPIRATION RATE: 16 BRPM | BODY MASS INDEX: 39.27 KG/M2 | DIASTOLIC BLOOD PRESSURE: 84 MMHG | HEIGHT: 64 IN | WEIGHT: 230 LBS | OXYGEN SATURATION: 98 %

## 2023-08-22 DIAGNOSIS — Z30.09 ENCOUNTER FOR OTHER GENERAL COUNSELING AND ADVICE ON CONTRACEPTION: ICD-10-CM

## 2023-08-22 DIAGNOSIS — N89.8 OTHER SPECIFIED NONINFLAMMATORY DISORDERS OF VAGINA: ICD-10-CM

## 2023-08-22 DIAGNOSIS — Z11.3 ENCOUNTER FOR SCREENING FOR INFECTIONS WITH A PREDOMINANTLY SEXUAL MODE OF TRANSMISSION: ICD-10-CM

## 2023-08-22 PROCEDURE — 36415 COLL VENOUS BLD VENIPUNCTURE: CPT

## 2023-08-22 PROCEDURE — 99213 OFFICE O/P EST LOW 20 MIN: CPT

## 2023-08-22 RX ORDER — CLOTRIMAZOLE AND BETAMETHASONE DIPROPIONATE 10; .5 MG/G; MG/G
1-0.05 CREAM TOPICAL 3 TIMES DAILY
Qty: 1 | Refills: 1 | Status: ACTIVE | COMMUNITY
Start: 2023-08-22 | End: 1900-01-01

## 2023-08-22 NOTE — REVIEW OF SYSTEMS
[Frequency] : no frequency [Incontinence] : no incontinence [Dysuria] : no dysuria [Urethral Discharge] : no urethral discharge [Abn Vaginal bleeding] : no abnormal vaginal bleeding [Pelvic pain] : no pelvic pain [CVA Pain] : no CVA pain [Genital Rash/Irritation] : genital rash/irritation [Negative] : Heme/Lymph

## 2023-08-22 NOTE — HISTORY OF PRESENT ILLNESS
[FreeTextEntry1] : 34yo presents with c/o vaginal irritation/discharge (water).  Pt. with hx of recurrent BV/yeast infections.  Pt. also with new partner and desires sti screen.  Using condoms. Pt. also interested in contraceptive pill.

## 2023-08-22 NOTE — ED PROVIDER NOTE - EMPLOYMENT
MEDICATION MANAGEMENT NOTE        [facility]   PSYCHIATRIC ASSOC [address]  [facility] [address]  [facility]  [address] [place] [id number] [phone]        Name and Date of Birth:  James Land 36 y.o. 1981    Date of Visit: August 22, 2023  SUBJECTIVE:       Jannette seen by [facility] 5/22 at which time meds unchanged. Has been following up with cardiology and GI. Continues to see Amina Castelan for ind therapy. Mood has been good. Chief issue is anxiety -worry, restlessness and pushes herself more than she has to. States part of her need to stay busy is that this helps her avoid thinking about food- admits to eating when she is bored/unoccupied. Has lost 20 lbs but struggles to lose any more. Denies SI. Review of Systems   Constitutional: Negative for activity change and appetite change. Neurological:        "sciatica"   Psychiatric/Behavioral: Negative for sleep disturbance. Psychiatric History      This office since 2/25/20 (Dr. Rachel Mcqueen). Ind therapy Tang Dash 5/11/20 and Zita Stubits starting 1/22/21. In OP tx since 25 yo. Has seen Dr. Arely Lopez in the past.  No IP or suicide attempts. FHx- mother and brother with depression. Trauma/Loss History       Death of father from MI when Jannette was 10 yo. Bullied by brother until age 10 yo. Social History       x 20 yrs. Lives with  and teen-age daughter.             OBJECTIVE:     MENTAL STATUS EXAM  Appearance:  age appropriate, dressed casually   Behavior:  Pleasant & cooperative   Speech:  Normal volume, regular rate and rhythm   Mood:  euthymic, a bit tense   Affect:  mood congruent   Language: intact and appropriate for age, education, and intellect   Thought Process:  goal directed   Associations: intact associations   Thought Content:  negative thinking and cognitive distortions   Perceptual Disturbances: no auditory or visual hallcunations   Risk Potential / Abnormal Thoughts: Suicidal ideation - None  Homicidal ideation - None  Potential for aggression - No       Consciousness:  Alert & Awake   Sensorium:  Grossly oriented   Attention: attention span and concentration are age appropriate       Fund of Knowledge:  Memory: awareness of current events: yes  recent and remote memory grossly intact   Insight:  good   Judgment: good   Muscle Strength Muscle Tone: Grossly normal  normal   Gait/Station: normal gait/station with good balance   Motor Activity: no abnormal movements       Lab Review: I have reviewed all pertinent labs  Lab Results   Component Value Date    HGBA1C 5.1 08/01/2023     Lab Results   Component Value Date    CHOLESTEROL 181 08/01/2023     Lab Results   Component Value Date    HDL 51 08/01/2023     Lab Results   Component Value Date    TRIG 164 (H) 08/01/2023     Lab Results   Component Value Date    NONHDLC 135 11/15/2022     Lab Results   Component Value Date     04/05/2017    SODIUM 140 03/30/2023    K 3.9 03/30/2023     03/30/2023    CO2 29 03/30/2023    ANIONGAP 7 09/19/2014    AGAP 9 03/30/2023    BUN 13 03/30/2023    CREATININE 0.59 (L) 03/30/2023    GLUC 150 (H) 01/03/2022    GLUF 114 (H) 03/30/2023    CALCIUM 9.4 03/30/2023    AST 20 08/01/2023    ALT 48 08/01/2023    ALKPHOS 62 08/01/2023    PROT 6.7 04/05/2017    TP 7.4 08/01/2023    BILITOT 0.6 04/05/2017    TBILI 0.43 08/01/2023    EGFR 112 03/30/2023     Lab Results   Component Value Date    WBC 7.49 03/30/2023    HGB 14.1 03/30/2023    HCT 42.4 03/30/2023    MCV 92 03/30/2023     03/30/2023       Lab Results   Component Value Date    NZV2OFISLDJU 1.750 08/01/2023    TSH 0.85 10/23/2018           ASSESSMENT & PLAN          Diagnoses and all orders for this visit:    Moderate recurrent major depression (HCC)  -     amitriptyline (ELAVIL) 75 mg tablet;  Take 1 tablet (75 mg total) by mouth daily at bedtime    Generalized anxiety disorder  -     venlafaxine (EFFEXOR-XR) 150 mg 24 hr capsule; Take 1 capsule (150 mg total) by mouth daily at bedtime  -     venlafaxine (EFFEXOR-XR) 75 mg 24 hr capsule; Take 1 capsule (75 mg total) by mouth daily at bedtime  -     amitriptyline (ELAVIL) 75 mg tablet; Take 1 tablet (75 mg total) by mouth daily at bedtime      Current Outpatient Medications   Medication Sig Dispense Refill   • amitriptyline (ELAVIL) 75 mg tablet Take 1 tablet (75 mg total) by mouth daily at bedtime 90 tablet 0   • venlafaxine (EFFEXOR-XR) 150 mg 24 hr capsule Take 1 capsule (150 mg total) by mouth daily at bedtime 90 capsule 0   • venlafaxine (EFFEXOR-XR) 75 mg 24 hr capsule Take 1 capsule (75 mg total) by mouth daily at bedtime 90 capsule 0   • albuterol (PROVENTIL HFA,VENTOLIN HFA) 90 mcg/act inhaler Inhale 2 puffs every 6 (six) hours as needed for wheezing or shortness of breath (cough) 18 g 1   • Ascorbic Acid (VITAMIN C) 500 MG CAPS Take 1 capsule by mouth daily at bedtime     • aspirin 81 mg chewable tablet Chew 1 tablet (81 mg total) daily (Patient taking differently: Chew 81 mg daily at bedtime) 30 tablet 30   • cetirizine (ZyrTEC) 10 mg tablet Take 10 mg by mouth daily     • Cholecalciferol (VITAMIN D3) 5000 units CAPS Take 5,000 Units by mouth daily at bedtime     • ciprofloxacin-dexamethasone (CIPRODEX) otic suspension Administer 4 drops to the right ear in the morning and 4 drops in the evening.  7.5 mL 3   • cyanocobalamin (VITAMIN B-12) 1,000 mcg tablet Take 1,000 mcg by mouth daily      • docusate sodium (DULCOLAX) 100 mg capsule Take by mouth daily at bedtime     • esomeprazole (NexIUM) 40 MG capsule Take 1 capsule (40 mg total) by mouth 2 (two) times a day before meals 180 capsule 3   • levofloxacin (LEVAQUIN) 500 mg tablet Take 500 mg by mouth daily     • losartan (COZAAR) 100 MG tablet TAKE 1 TABLET BY MOUTH ONCE DAILY (Patient taking differently: 100 mg daily at bedtime) 90 tablet 2   • Melatonin 10 MG TABS Take by mouth daily at bedtime • metoprolol succinate (TOPROL-XL) 100 mg 24 hr tablet TAKE 1 TABLET BY MOUTH ONCE DAILY (Patient taking differently: 100 mg daily at bedtime) 100 tablet 1   • metroNIDAZOLE (FLAGYL) 250 mg tablet Take 250 mg by mouth 3 (three) times a day     • Multiple Vitamins-Minerals (CENTRUM ADULTS PO) Take 1 tablet by mouth daily at bedtime     • neomycin-polymyxin-hydrocortisone (CORTISPORIN) otic solution Administer 4 drops into the left ear 3 (three) times a day for 10 days 10 mL 0   • nystatin (MYCOSTATIN) cream Apply topically 2 (two) times a day as needed (rash) 30 g 1   • Omega-3 Fatty Acids (FISH OIL ADULT GUMMIES PO) Take by mouth daily at bedtime     • rosuvastatin (CRESTOR) 40 MG tablet Take 1 tablet (40 mg total) by mouth daily (Patient taking differently: Take 40 mg by mouth daily at bedtime) 30 tablet 0   • Zinc 50 MG TABS Take by mouth daily at bedtime       No current facility-administered medications for this visit. Plan:              Recommended OA- 12 step program-  Jannette has looked into this in the past but never went to a meeting. Thinks she will be able to start with online meeting. Will cont meds unchanged- elavil 75 mg q bedtime- past decrease in this resulted in intolerable fibromyalgia pain; says cardiology has no concerns;  cont effexor xr 225 mg/d. Reviewed risks, benefits, side effects of medications, including no medication. Patient understands and agrees to treatment plan. Cont f/u with Michele Martinez for ind therapy       F/u Pa-C 3 mths, sooner prn       Patient has been informed of 24 hours and weekend coverage for urgent situations accessed by calling the main clinic phone number.      Robbi Wiseman PA-C   Visit Time    Visit Start Time: 6111  Visit Stop Time: 5638  Total Visit Duration: 12 minutes Unemployed

## 2023-08-22 NOTE — PHYSICAL EXAM
[Chaperone Declined] : Patient declined chaperone [Appropriately responsive] : appropriately responsive [Alert] : alert [No Acute Distress] : no acute distress [Soft] : soft [Non-tender] : non-tender [Non-distended] : non-distended [Oriented x3] : oriented x3 [FreeTextEntry5] : Resp. rate wnl, color pink, no SOB [Labia Majora] : normal [Labia Minora] : normal [No Bleeding] : There was no active vaginal bleeding [Normal] : normal [Uterine Adnexae] : normal [FreeTextEntry4] : + watery discharge

## 2023-08-22 NOTE — PLAN
[FreeTextEntry1] : Vaginal irritation/Hx of recurrent BV/yeast -BD affirm today - Strategies to decrease vaginitis symptoms and recurrence discussed. Wear cotton underwear, loose fitting closes and avoid panty hose.  Avoid hot tubs, spas and douching as well as hygiene sprays, fragrances and powders in the genital area.  Use pads instead of tampons while you have an infection.  Appropriate front to back cleaning after using the toilet. Also discussed boric acid use.  -eRx lotrisone -std serology/GC today  -contraceptive counselling. Oral contraceptive counseling provided to the patient.  Discussed risks and benefits, including thromboembolic events, especially in the setting of smoking, or in the setting of pre-existing medical conditions that predispose to adverse cardiovascular events.  Benign side effects reviewed as well as risk of unintended pregnancy.   Discussion regarding decreased contraceptive efficacy when taken with certain medications or when dosing schedule is erratic.  They do not protect against STI's. All questions answered. -eRx Junel 1/20 fe Patient educated about the safe and effective use of medication and potential drug/food, drug/drug, drug/herb/vitamin interactions RTO 3 months

## 2023-08-23 LAB
C TRACH RRNA SPEC QL NAA+PROBE: NOT DETECTED
CANDIDA VAG CYTO: NOT DETECTED
G VAGINALIS+PREV SP MTYP VAG QL MICRO: NOT DETECTED
HBV SURFACE AG SER QL: NONREACTIVE
HCV AB SER QL: NONREACTIVE
HCV S/CO RATIO: 0.2 S/CO
HIV1+2 AB SPEC QL IA.RAPID: NONREACTIVE
N GONORRHOEA RRNA SPEC QL NAA+PROBE: NOT DETECTED
SOURCE AMPLIFICATION: NORMAL
T PALLIDUM AB SER QL IA: NEGATIVE
T VAGINALIS VAG QL WET PREP: NOT DETECTED

## 2023-08-26 LAB — BACTERIA UR CULT: NORMAL

## 2023-09-05 ENCOUNTER — TRANSCRIPTION ENCOUNTER (OUTPATIENT)
Age: 33
End: 2023-09-05

## 2023-09-05 RX ORDER — NORETHINDRONE ACETATE AND ETHINYL ESTRADIOL AND FERROUS FUMARATE 1MG-20(21)
1-20 KIT ORAL
Qty: 3 | Refills: 0 | Status: ACTIVE | COMMUNITY
Start: 2023-08-22 | End: 1900-01-01

## 2023-12-31 PROBLEM — Z11.3 ENCOUNTER FOR SCREENING EXAMINATION FOR SEXUALLY TRANSMITTED DISEASE: Status: ACTIVE | Noted: 2022-05-16

## 2024-01-02 NOTE — ASU PATIENT PROFILE, ADULT - REASON FOR ADMISSION, PROFILE
12 ml of contrast were injected throughout the case. 88 mL of contrast was the total wasted during the case. 100 mL was the total amount used during the case.
A dressing was applied to the right femoral artery puncture site.
A dressing was applied to the right internal jugular vein puncture site.
A percutaneous stick to the right femoral artery was performed. Ultrasound guidance was used to obtain access.
A percutaneous stick to the right internal jugular vein was performed. Ultrasound guidance was used to obtain access.
Manual pressure was applied to the right femoral artery and right internal jugular vein sheath insertion site.
Mom updated at this time. 
Phone report was given to Layla NEWTON RN PCR 
The PA catheter was inserted into the right atrium. Hemodynamics were performed.
The PA catheter was removed from the right pulmonary artery.
The PA catheter was repositioned to the LPW. Hemodynamics were performed.
The PA catheter was repositioned to the RPW. Hemodynamics were performed.
The PA catheter was repositioned to the left pulmonary artery. Hemodynamics were performed. O2 saturation was measured.
The PA catheter was repositioned to the right pulmonary artery. Hemodynamics were performed. O2 saturation was measured at 69%. CO=4.50, CI=3.50
The PA catheter was repositioned to the right ventricle. Hemodynamics were performed.
The RHC wire was inserted in to the right ventricle.
The RHC wire was removed from the right ventricle.
The biopsy device collected a biopsy of the RV. The biopsy sample count is 4.
The biopsy device was inserted into the RV.
The biopsy device was removed from the RV.
The catheter was inserted into the left ventricle. Hemodynamics were performed.
The catheter was inserted into the ostium   right coronary artery. An angiography was performed of the right coronary arteries. Multiple views were taken.
The catheter was removed from the ostium   left main.
The catheter was removed from the ostium   right coronary artery.
The catheter was repositioned into the AAO. Hemodynamics were performed.
The catheter was repositioned into the ostium   left main. An angiography was performed of the left coronary arteries. Multiple views were taken.
The sheath was exchanged in the right internal jugular vein.
The sheath was inserted into the right femoral artery.
The sheath was inserted into the right internal jugular vein.
The sheath was removed from the right femoral artery.
The sheath was removed from the right internal jugular vein.
dry, intact and no bleeding.
Abdominal Myomectomy

## 2024-02-27 NOTE — ED PROVIDER NOTE - GENITOURINARY NEGATIVE STATEMENT, MLM
Per Mom, this was sent to wrong pharmacy. Needs to go to [facility] for the insurance to cover it. Thank you!   no dysuria, no hematuria.

## 2024-05-06 LAB
CYTOLOGY CVX/VAG DOC THIN PREP: NORMAL
DHEA SERPL-MCNC: 287 NG/DL
PROLACTIN SERPL-MCNC: 36.9 NG/ML

## 2024-06-28 ENCOUNTER — APPOINTMENT (OUTPATIENT)
Dept: OBGYN | Facility: CLINIC | Age: 34
End: 2024-06-28
Payer: COMMERCIAL

## 2024-06-28 VITALS
SYSTOLIC BLOOD PRESSURE: 139 MMHG | WEIGHT: 225 LBS | BODY MASS INDEX: 38.41 KG/M2 | DIASTOLIC BLOOD PRESSURE: 87 MMHG | HEART RATE: 92 BPM | HEIGHT: 64 IN | OXYGEN SATURATION: 98 %

## 2024-06-28 PROCEDURE — 99395 PREV VISIT EST AGE 18-39: CPT

## 2024-06-29 LAB
25(OH)D3 SERPL-MCNC: 37.3 NG/ML
ANTI-MUELLERIAN HORMONE: 3.26 NG/ML
HBV SURFACE AG SER QL: NONREACTIVE
HCV AB SER QL: NONREACTIVE
HCV S/CO RATIO: 0.21 S/CO
HIV1+2 AB SPEC QL IA.RAPID: NONREACTIVE
T PALLIDUM AB SER QL IA: NEGATIVE

## 2024-07-01 LAB
C TRACH RRNA SPEC QL NAA+PROBE: NOT DETECTED
HPV HIGH+LOW RISK DNA PNL CVX: NOT DETECTED
N GONORRHOEA RRNA SPEC QL NAA+PROBE: NOT DETECTED
SOURCE TP AMPLIFICATION: NORMAL

## 2024-09-05 ENCOUNTER — NON-APPOINTMENT (OUTPATIENT)
Age: 34
End: 2024-09-05

## 2025-01-27 NOTE — PROGRESS NOTE ADULT - SUBJECTIVE AND OBJECTIVE BOX
REFERRAL TO SPEECH THERAPY    Patient is a 30y old  Female who presents with a chief complaint of postoperative fever (02 Dec 2020 06:27)    Being followed by ID for        Interval history:  pt with seven watery bowel mvmts yesterday  denies cough  denies urinary sxs  No other acute events        PAST MEDICAL & SURGICAL HISTORY:  Uterine fibroid  S/P myomectomy on 2020    Erythrocytosis  monitored by hematologist    S/P myomectomy  2020    History of elective   x 2      Allergies    Ceclor (Rash)    Intolerances      Antimicrobials:    piperacillin/tazobactam IVPB.. 3.375 Gram(s) IV Intermittent every 8 hours  vancomycin  IVPB 1000 milliGRAM(s) IV Intermittent every 12 hours    MEDICATIONS  (STANDING):  ascorbic acid 500 milliGRAM(s) Oral daily  ferrous    sulfate 325 milliGRAM(s) Oral daily  heparin   Injectable 5000 Unit(s) SubCutaneous every 12 hours  influenza   Vaccine 0.5 milliLiter(s) IntraMuscular once  lactated ringers. 1000 milliLiter(s) (125 mL/Hr) IV Continuous <Continuous>  lactobacillus acidophilus 1 Tablet(s) Oral daily  piperacillin/tazobactam IVPB.. 3.375 Gram(s) IV Intermittent every 8 hours  simethicone 80 milliGRAM(s) Chew every 6 hours  vancomycin  IVPB 1000 milliGRAM(s) IV Intermittent every 12 hours      Vital Signs Last 24 Hrs  T(C): 37 (20 @ 09:04), Max: 38.7 (20 @ 01:30)  T(F): 98.6 (20 @ 09:04), Max: 101.6 (20 @ 01:30)  HR: 85 (20 @ 09:04) (77 - 101)  BP: 115/80 (20 @ 09:04) (113/79 - 122/85)  BP(mean): --  RR: 18 (20 @ 09:04) (18 - 18)  SpO2: 97% (20 @ 09:04) (95% - 99%)    Physical Exam:    Constitutional well preserved,comfortable,pleasant    HEENT PERRLA EOMI,No pallor or icterus    No oral exudate or erythema    Neck supple no JVD or LN    Chest Good AE,CTA    CVS RRR S1 S2     Abd soft BS normal No tenderness  wound- c/d/i    small ecchymoses right flank    Ext No cyanosis clubbing or edema    IV sites no erythema tenderness or discharge    Joints no swelling or LOM    CNS AAO X 3 no focal    Lab Data:                          9.9    7.70  )-----------( 435      ( 02 Dec 2020 06:08 )             30.8           141  |  104  |  8   ----------------------------<  103<H>  3.6   |  23  |  1.24    Ca    9.2      02 Dec 2020 06:08    TPro  6.3  /  Alb  3.2<L>  /  TBili  0.5  /  DBili  x   /  AST  33  /  ALT  40  /  AlkPhos  161<H>            .Body Fluid hematoma  20   No growth  --    No polymorphonuclear cells seen per low power field  No organisms seen per oil power field      .Urine Clean Catch (Midstream)  20   <10,000 CFU/mL Normal Urogenital Elvira  --  --      .Blood Blood-Peripheral  20   No growth to date.  --  --      Vancomycin Level, Trough: 13.1 ug/mL (20 @ 15:20)  Vancomycin Level, Trough: 10.1 ug/mL (20 @ 22:46)      WBC Count: 7.70 (20 @ 06:08)  WBC Count: 7.84 (20 @ 07:08)  WBC Count: 10.30 (20 @ 07:16)  WBC Count: 12.95 (20 @ 07:17)  WBC Count: 12.90 (20 @ 00:19)  WBC Count: 12.71 (20 @ 11:10)  WBC Count: 11.32 (20 @ 23:25)  WBC Count: 9.13 (20 @ 17:17)  WBC Count: 9.27 (20 @ 13:46)  WBC Count: 9.12 (20 @ 06:52)  WBC Count: 8.85 (20 @ 18:52)      < from: IR Procedure (11.30.20 @ 15:35) >  IMPRESSION:    Percutaneous aspiration of pelvic collection, yielding 3 mL of bloody fluid. No drainage catheter was left in place.    < end of copied text >      < from: MR Abdomen w/wo IV Cont (20 @ 23:17) >  EXAM:  MR ABDOMEN WAW IC                          EXAM:  MR PELVIS WAW IC                            PROCEDURE DATE:  2020            INTERPRETATION:  CLINICAL INFORMATION: 30-year-old female status post myomectomy with persistent fevers on antibiotics.    COMPARISON: CT abdomen pelvis 2020, 2020, MR pelvis 2020.    PROCEDURE:  MRI of the abdomen and pelvis was performed with and without intravenous contrast.  IV Contrast: Gadavist. 10 cc administered, 0 cc discarded.    FINDINGS:  LOWER CHEST: Trace bilateral pleural effusions.    LIVER: Within normal limits.  BILE DUCTS: Normal caliber.  GALLBLADDER: Within normal limits.  SPLEEN: Within normal limits.  PANCREAS: Within normal limits.  ADRENALS: Within normal limits.  KIDNEYS/URETERS: No hydronephrosis.    BLADDER: Within normal limits.  REPRODUCTIVE ORGANS:    UTERUS: 13.8 x 4.0 x 5.7 cm. Endometrium and junctional zone are normal in thickness. Intrauterine device in good position. Status post myomectomy with a postsurgical defect in the right lateral wall of the uterus. Hematoma in the pelvis immediately adjacent to the uterine defects measuring 13.5 x 12.3 x 9.6 cm. No large amount of air within the collection.    LEFT OVARY: Normal in appearance.  RIGHT OVARY: Normal in appearance.    BOWEL: No bowel obstruction.  PERITONEUM: Trace upper abdominal ascites. Large pelvic hematoma as above.  VESSELS: Within normal limits.  RETROPERITONEUM/LYMPH NODES: No lymphadenopathy.  ABDOMINAL WALL: Postsurgical changes.  BONES: Within normal limits.    IMPRESSION:  Status post myomectomy with a postsurgical defect in the right lateral wall of the uterus.    Large pelvic hematoma as above.        CARMINA MARSH MD; Resident Radiology  This document has been electronically signed.  JOSELUIS NICHOLE MD; Attending Radiologist  This document has been electronically signed. 2020 10:19AM    < end of copied text >

## 2025-02-07 ENCOUNTER — NON-APPOINTMENT (OUTPATIENT)
Age: 35
End: 2025-02-07

## 2025-08-07 ENCOUNTER — NON-APPOINTMENT (OUTPATIENT)
Age: 35
End: 2025-08-07

## 2025-08-08 ENCOUNTER — APPOINTMENT (OUTPATIENT)
Dept: OBGYN | Facility: CLINIC | Age: 35
End: 2025-08-08

## 2025-08-08 VITALS
WEIGHT: 251 LBS | SYSTOLIC BLOOD PRESSURE: 110 MMHG | DIASTOLIC BLOOD PRESSURE: 80 MMHG | HEIGHT: 65 IN | BODY MASS INDEX: 41.82 KG/M2

## 2025-08-08 PROCEDURE — 99395 PREV VISIT EST AGE 18-39: CPT

## 2025-08-14 ENCOUNTER — TRANSCRIPTION ENCOUNTER (OUTPATIENT)
Age: 35
End: 2025-08-14

## 2025-08-14 DIAGNOSIS — Z78.0 ASYMPTOMATIC MENOPAUSAL STATE: ICD-10-CM

## 2025-08-14 LAB
C TRACH RRNA SPEC QL NAA+PROBE: NOT DETECTED
CYTOLOGY CVX/VAG DOC THIN PREP: NORMAL
ESTRADIOL SERPL-MCNC: 29 PG/ML
FSH SERPL-MCNC: 3.4 IU/L
HBV SURFACE AG SER QL: NONREACTIVE
HCV AB SER QL: NONREACTIVE
HCV S/CO RATIO: 0.22 S/CO
HIV1+2 AB SPEC QL IA.RAPID: NONREACTIVE
HPV HIGH+LOW RISK DNA PNL CVX: NOT DETECTED
LH SERPL-ACNC: 7.6 IU/L
N GONORRHOEA RRNA SPEC QL NAA+PROBE: NOT DETECTED
SOURCE TP AMPLIFICATION: NORMAL
T PALLIDUM AB SER QL IA: NEGATIVE
TSH SERPL-ACNC: 2.15 UIU/ML

## 2025-08-15 ENCOUNTER — TRANSCRIPTION ENCOUNTER (OUTPATIENT)
Age: 35
End: 2025-08-15

## 2025-08-15 LAB — 25(OH)D3 SERPL-MCNC: 31.7 NG/ML

## 2025-08-18 ENCOUNTER — APPOINTMENT (OUTPATIENT)
Dept: ULTRASOUND IMAGING | Facility: CLINIC | Age: 35
End: 2025-08-18
Payer: COMMERCIAL

## 2025-08-18 ENCOUNTER — OUTPATIENT (OUTPATIENT)
Dept: OUTPATIENT SERVICES | Facility: HOSPITAL | Age: 35
LOS: 1 days | End: 2025-08-18
Payer: COMMERCIAL

## 2025-08-18 DIAGNOSIS — Z98.890 OTHER SPECIFIED POSTPROCEDURAL STATES: Chronic | ICD-10-CM

## 2025-08-18 DIAGNOSIS — D21.9 BENIGN NEOPLASM OF CONNECTIVE AND OTHER SOFT TISSUE, UNSPECIFIED: ICD-10-CM

## 2025-08-18 PROCEDURE — 76830 TRANSVAGINAL US NON-OB: CPT | Mod: 26

## 2025-08-18 PROCEDURE — 76856 US EXAM PELVIC COMPLETE: CPT

## 2025-08-18 PROCEDURE — 76830 TRANSVAGINAL US NON-OB: CPT

## 2025-08-26 ENCOUNTER — LABORATORY RESULT (OUTPATIENT)
Age: 35
End: 2025-08-26

## 2025-08-27 ENCOUNTER — TRANSCRIPTION ENCOUNTER (OUTPATIENT)
Age: 35
End: 2025-08-27

## 2025-09-02 ENCOUNTER — TRANSCRIPTION ENCOUNTER (OUTPATIENT)
Age: 35
End: 2025-09-02

## 2025-09-02 DIAGNOSIS — E22.1 HYPERPROLACTINEMIA: ICD-10-CM

## 2025-09-15 ENCOUNTER — TRANSCRIPTION ENCOUNTER (OUTPATIENT)
Age: 35
End: 2025-09-15